# Patient Record
Sex: FEMALE | Race: WHITE | Employment: UNEMPLOYED | URBAN - NONMETROPOLITAN AREA
[De-identification: names, ages, dates, MRNs, and addresses within clinical notes are randomized per-mention and may not be internally consistent; named-entity substitution may affect disease eponyms.]

---

## 2018-08-04 ENCOUNTER — APPOINTMENT (OUTPATIENT)
Dept: GENERAL RADIOLOGY | Age: 38
End: 2018-08-04
Payer: MEDICAID

## 2018-08-04 ENCOUNTER — HOSPITAL ENCOUNTER (EMERGENCY)
Age: 38
Discharge: HOME | End: 2018-08-05
Payer: MEDICAID

## 2018-08-04 VITALS
OXYGEN SATURATION: 97 % | TEMPERATURE: 97.2 F | HEIGHT: 63 IN | DIASTOLIC BLOOD PRESSURE: 87 MMHG | RESPIRATION RATE: 20 BRPM | HEART RATE: 89 BPM | SYSTOLIC BLOOD PRESSURE: 122 MMHG | WEIGHT: 230 LBS | BODY MASS INDEX: 40.75 KG/M2

## 2018-08-04 DIAGNOSIS — S93.402A SPRAIN OF LEFT ANKLE, UNSPECIFIED LIGAMENT, INITIAL ENCOUNTER: Primary | ICD-10-CM

## 2018-08-04 PROCEDURE — 6370000000 HC RX 637 (ALT 250 FOR IP): Performed by: NURSE PRACTITIONER

## 2018-08-04 PROCEDURE — 73610 X-RAY EXAM OF ANKLE: CPT

## 2018-08-04 PROCEDURE — 99283 EMERGENCY DEPT VISIT LOW MDM: CPT

## 2018-08-04 PROCEDURE — 6360000002 HC RX W HCPCS: Performed by: NURSE PRACTITIONER

## 2018-08-04 RX ORDER — SUMATRIPTAN 100 MG/1
100 TABLET, FILM COATED ORAL
COMMUNITY
End: 2018-12-07 | Stop reason: SDUPTHER

## 2018-08-04 RX ORDER — DIPHENOXYLATE HYDROCHLORIDE AND ATROPINE SULFATE 2.5; .025 MG/1; MG/1
1 TABLET ORAL 4 TIMES DAILY PRN
COMMUNITY
End: 2018-10-17 | Stop reason: SDUPTHER

## 2018-08-04 RX ORDER — TIZANIDINE HYDROCHLORIDE 4 MG/1
4 CAPSULE, GELATIN COATED ORAL 3 TIMES DAILY
COMMUNITY
End: 2018-11-15 | Stop reason: SDUPTHER

## 2018-08-04 RX ORDER — OXYCODONE HYDROCHLORIDE AND ACETAMINOPHEN 5; 325 MG/1; MG/1
2 TABLET ORAL ONCE
Status: COMPLETED | OUTPATIENT
Start: 2018-08-04 | End: 2018-08-04

## 2018-08-04 RX ORDER — ARIPIPRAZOLE 15 MG/1
15 TABLET ORAL DAILY
COMMUNITY
End: 2018-12-07 | Stop reason: SDUPTHER

## 2018-08-04 RX ORDER — GABAPENTIN 600 MG/1
600 TABLET ORAL 4 TIMES DAILY
COMMUNITY
End: 2018-08-28 | Stop reason: ALTCHOICE

## 2018-08-04 RX ORDER — NORTRIPTYLINE HYDROCHLORIDE 75 MG/1
75 CAPSULE ORAL NIGHTLY
COMMUNITY
End: 2018-11-27 | Stop reason: SDUPTHER

## 2018-08-04 RX ORDER — ZOLPIDEM TARTRATE 5 MG/1
5 TABLET ORAL NIGHTLY PRN
COMMUNITY
End: 2018-08-28

## 2018-08-04 RX ORDER — IBUPROFEN 800 MG/1
800 TABLET ORAL EVERY 6 HOURS PRN
COMMUNITY
End: 2018-08-28 | Stop reason: SDUPTHER

## 2018-08-04 RX ORDER — ONDANSETRON 4 MG/1
4 TABLET, ORALLY DISINTEGRATING ORAL ONCE
Status: COMPLETED | OUTPATIENT
Start: 2018-08-04 | End: 2018-08-04

## 2018-08-04 RX ADMIN — ONDANSETRON 4 MG: 4 TABLET, ORALLY DISINTEGRATING ORAL at 23:38

## 2018-08-04 RX ADMIN — OXYCODONE HYDROCHLORIDE AND ACETAMINOPHEN 2 TABLET: 5; 325 TABLET ORAL at 23:38

## 2018-08-04 ASSESSMENT — PAIN SCALES - GENERAL
PAINLEVEL_OUTOF10: 8
PAINLEVEL_OUTOF10: 8

## 2018-08-05 PROCEDURE — 99283 EMERGENCY DEPT VISIT LOW MDM: CPT | Performed by: NURSE PRACTITIONER

## 2018-08-05 RX ORDER — NAPROXEN 500 MG/1
500 TABLET ORAL 2 TIMES DAILY
Qty: 20 TABLET | Refills: 0 | Status: SHIPPED | OUTPATIENT
Start: 2018-08-05 | End: 2018-08-28

## 2018-08-05 NOTE — ED PROVIDER NOTES
[id number]  [phone]    As needed, If symptoms worsen      DISCHARGE MEDICATIONS:  Discharge Medication List as of 8/5/2018 12:05 AM      START taking these medications    Details   naproxen (NAPROSYN) 500 MG tablet Take 1 tablet by mouth 2 times daily, Disp-20 tablet, R-0Print             (Please note that portions of this note were completed with a voice recognition program.  Efforts were made to edit the dictations but occasionally words are mis-transcribed.)    SID Gomez APRN  08/05/18 0100

## 2018-08-28 ENCOUNTER — OFFICE VISIT (OUTPATIENT)
Dept: PRIMARY CARE CLINIC | Age: 38
End: 2018-08-28
Payer: MEDICAID

## 2018-08-28 VITALS
HEIGHT: 64 IN | OXYGEN SATURATION: 99 % | SYSTOLIC BLOOD PRESSURE: 122 MMHG | DIASTOLIC BLOOD PRESSURE: 78 MMHG | TEMPERATURE: 97.7 F | WEIGHT: 240.8 LBS | BODY MASS INDEX: 41.11 KG/M2 | HEART RATE: 67 BPM

## 2018-08-28 DIAGNOSIS — G62.9 NEUROPATHY: ICD-10-CM

## 2018-08-28 DIAGNOSIS — F32.A ANXIETY AND DEPRESSION: ICD-10-CM

## 2018-08-28 DIAGNOSIS — K30 FUNCTIONAL DYSPEPSIA: ICD-10-CM

## 2018-08-28 DIAGNOSIS — F51.01 PRIMARY INSOMNIA: ICD-10-CM

## 2018-08-28 DIAGNOSIS — Z13.220 LIPID SCREENING: ICD-10-CM

## 2018-08-28 DIAGNOSIS — F41.9 ANXIETY AND DEPRESSION: ICD-10-CM

## 2018-08-28 DIAGNOSIS — Z13.29 THYROID DISORDER SCREEN: ICD-10-CM

## 2018-08-28 DIAGNOSIS — K58.0 IRRITABLE BOWEL SYNDROME WITH DIARRHEA: ICD-10-CM

## 2018-08-28 DIAGNOSIS — Z79.899 DRUG THERAPY: ICD-10-CM

## 2018-08-28 DIAGNOSIS — Z76.89 ENCOUNTER TO ESTABLISH CARE: Primary | ICD-10-CM

## 2018-08-28 DIAGNOSIS — Z13.1 DIABETES MELLITUS SCREENING: ICD-10-CM

## 2018-08-28 DIAGNOSIS — G43.709 CHRONIC MIGRAINE WITHOUT AURA WITHOUT STATUS MIGRAINOSUS, NOT INTRACTABLE: ICD-10-CM

## 2018-08-28 DIAGNOSIS — M51.36 DDD (DEGENERATIVE DISC DISEASE), LUMBAR: ICD-10-CM

## 2018-08-28 PROBLEM — I10 BENIGN ESSENTIAL HTN: Status: ACTIVE | Noted: 2018-08-28

## 2018-08-28 LAB
AMPHETAMINE SCREEN, URINE: ABNORMAL
BARBITURATE SCREEN, URINE: ABNORMAL
BENZODIAZEPINE SCREEN, URINE: ABNORMAL
BUPRENORPHINE URINE: ABNORMAL
COCAINE METABOLITE SCREEN URINE: ABNORMAL
GABAPENTIN SCREEN, URINE: ABNORMAL
METHADONE SCREEN, URINE: ABNORMAL
METHAMPHETAMINE, URINE: ABNORMAL
OPIATE SCREEN URINE: ABNORMAL
OXYCODONE SCREEN URINE: ABNORMAL
PHENCYCLIDINE SCREEN URINE: ABNORMAL
PROPOXYPHENE SCREEN, URINE: ABNORMAL
THC SCREEN, URINE: ABNORMAL
TRICYCLIC ANTIDEPRESSANTS, UR: ABNORMAL

## 2018-08-28 PROCEDURE — 80305 DRUG TEST PRSMV DIR OPT OBS: CPT | Performed by: NURSE PRACTITIONER

## 2018-08-28 PROCEDURE — 99204 OFFICE O/P NEW MOD 45 MIN: CPT | Performed by: NURSE PRACTITIONER

## 2018-08-28 RX ORDER — ZOLPIDEM TARTRATE 5 MG/1
5 TABLET ORAL NIGHTLY PRN
Qty: 30 TABLET | Refills: 0 | Status: CANCELLED | OUTPATIENT
Start: 2018-08-28 | End: 2018-09-27

## 2018-08-28 RX ORDER — HYDROXYZINE PAMOATE 25 MG/1
25 CAPSULE ORAL 3 TIMES DAILY PRN
Qty: 60 CAPSULE | Refills: 0 | Status: SHIPPED | OUTPATIENT
Start: 2018-08-28 | End: 2018-09-27

## 2018-08-28 RX ORDER — ALBUTEROL SULFATE 90 UG/1
2 AEROSOL, METERED RESPIRATORY (INHALATION) EVERY 6 HOURS PRN
COMMUNITY
End: 2018-10-26 | Stop reason: SDUPTHER

## 2018-08-28 RX ORDER — IBUPROFEN 800 MG/1
800 TABLET ORAL EVERY 6 HOURS PRN
Qty: 60 TABLET | Refills: 1 | Status: SHIPPED | OUTPATIENT
Start: 2018-08-28 | End: 2018-10-04 | Stop reason: SDUPTHER

## 2018-08-28 RX ORDER — DULOXETIN HYDROCHLORIDE 20 MG/1
20 CAPSULE, DELAYED RELEASE ORAL DAILY
Qty: 30 CAPSULE | Refills: 3 | Status: SHIPPED | OUTPATIENT
Start: 2018-08-28 | End: 2018-09-28 | Stop reason: SDUPTHER

## 2018-08-28 RX ORDER — GABAPENTIN 600 MG/1
600 TABLET ORAL 4 TIMES DAILY
Qty: 120 TABLET | Refills: 0 | Status: CANCELLED | OUTPATIENT
Start: 2018-08-28 | End: 2018-09-27

## 2018-08-28 ASSESSMENT — PATIENT HEALTH QUESTIONNAIRE - PHQ9
SUM OF ALL RESPONSES TO PHQ QUESTIONS 1-9: 1
SUM OF ALL RESPONSES TO PHQ QUESTIONS 1-9: 1
SUM OF ALL RESPONSES TO PHQ9 QUESTIONS 1 & 2: 1
1. LITTLE INTEREST OR PLEASURE IN DOING THINGS: 0
2. FEELING DOWN, DEPRESSED OR HOPELESS: 1

## 2018-08-28 ASSESSMENT — ENCOUNTER SYMPTOMS
RESPIRATORY NEGATIVE: 1
BACK PAIN: 1
EYES NEGATIVE: 1
GASTROINTESTINAL NEGATIVE: 1

## 2018-08-28 NOTE — PROGRESS NOTES
[facility]  [address]  [address] [facility] [address]  Dept: [phone]  Dept Fax: [phone]  Loc: [phone]    Danis Raymond is a 43 y.o. female who presents today for her medical conditions/complaints as noted below. Danis Raymond is c/o of Establish Care (needing medication refilled) and Anxiety (having some issues)      Chief Complaint   Patient presents with    Establish Care     needing medication refilled    Anxiety     having some issues       HPI:     HPI  Patient is here to establish care. Patient has a known medical history including asthma, degenerative disc disease and lumbar region, migraines, and neuropathy. She is needing refills on multiple medications including ibuprofen 800, Ambien, verapamil, and Neurontin. She is wanting to discuss changing from Neurontin to another medication. She reports taking for 2-3 years in [place] without much relief. Last dose was 600 mg QID. She has not tried Cymbalta or Lyrica for the discomfort. Patient reports smoking marijuana while in [place], but it was legal there.       Past Medical History:   Diagnosis Date    Asthma     Degenerative disc disease, lumbar     Migraines     Neuropathy         Past Surgical History:   Procedure Laterality Date     SECTION      ELBOW SURGERY      right    RECTAL SURGERY      x2       Social History   Substance Use Topics    Smoking status: Former Smoker     Packs/day: 1.00     Years: 10.00     Quit date: 2017    Smokeless tobacco: Never Used    Alcohol use No        Current Outpatient Prescriptions   Medication Sig Dispense Refill    albuterol sulfate HFA (VENTOLIN HFA) 108 (90 Base) MCG/ACT inhaler Inhale 2 puffs into the lungs every 6 hours as needed for Wheezing      verapamil (CALAN SR) 180 MG extended release tablet Take 1 tablet by mouth nightly 30 tablet 1    ibuprofen (ADVIL;MOTRIN) 800 MG tablet Take 1 tablet by mouth every 6 found for this visit on 08/28/18. Plan:     UDS was positive for THC, but patient last smoked while in [place] where it was legal.  I will repeat UDS in 1 month if [facility] plan to send in Ambien as discussed. I am having patient repeat labs in 3 months and will monitor lipid panel since it was elevated at last check in [place]. I am starting Cymbalta to help with Neuropathy and anxiety. Return in about 1 month (around 9/28/2018) for Medication Follow Up.     Orders Placed This Encounter   Procedures    CBC     Standing Status:   Future     Standing Expiration Date:   8/28/2019    Comprehensive Metabolic Panel     Standing Status:   Future     Standing Expiration Date:   8/28/2019    TSH without Reflex     Standing Status:   Future     Standing Expiration Date:   8/29/2019    Lipid Panel     Standing Status:   Future     Standing Expiration Date:   8/28/2019     Order Specific Question:   Is Patient Fasting?/# of Hours     Answer:   8 hours    Hemoglobin A1C     Standing Status:   Future     Standing Expiration Date:   8/28/2019   [id number], APRN     Referral Priority:   Routine     Referral Type:   Eval and Treat     Referral Reason:   Specialty Services Required     Referred to Provider:   SID Romero - CNP     Requested Specialty:   Nurse Practitioner     Number of Visits Requested:   1    POCT Rapid Drug Screen       Orders Placed This Encounter   Medications    verapamil (CALAN SR) 180 MG extended release tablet     Sig: Take 1 tablet by mouth nightly     Dispense:  30 tablet     Refill:  1    ibuprofen (ADVIL;MOTRIN) 800 MG tablet     Sig: Take 1 tablet by mouth every 6 hours as needed for Pain     Dispense:  60 tablet     Refill:  1    DULoxetine (CYMBALTA) 20 MG extended release capsule     Sig: Take 1 capsule by mouth daily     Dispense:  30 capsule     Refill:  3    hydrOXYzine (VISTARIL) 25 MG capsule     Sig: Take 1 capsule by

## 2018-08-29 ENCOUNTER — HOSPITAL ENCOUNTER (OUTPATIENT)
Dept: PAIN MANAGEMENT | Age: 38
Discharge: HOME | End: 2018-08-29
Payer: MEDICAID

## 2018-08-29 ENCOUNTER — HOSPITAL ENCOUNTER (OUTPATIENT)
Dept: GENERAL RADIOLOGY | Age: 38
Discharge: HOME | End: 2018-08-29
Payer: MEDICAID

## 2018-08-29 VITALS
RESPIRATION RATE: 18 BRPM | HEART RATE: 104 BPM | HEIGHT: 64 IN | BODY MASS INDEX: 40.97 KG/M2 | WEIGHT: 240 LBS | TEMPERATURE: 97.5 F | OXYGEN SATURATION: 94 %

## 2018-08-29 DIAGNOSIS — M54.5 CHRONIC MIDLINE LOW BACK PAIN, WITH SCIATICA PRESENCE UNSPECIFIED: ICD-10-CM

## 2018-08-29 DIAGNOSIS — M79.18 MYOFACIAL MUSCLE PAIN: ICD-10-CM

## 2018-08-29 DIAGNOSIS — M53.3 SACROILIAC JOINT DYSFUNCTION OF BOTH SIDES: ICD-10-CM

## 2018-08-29 DIAGNOSIS — M53.3 SACROILIAC JOINT DYSFUNCTION OF BOTH SIDES: Primary | ICD-10-CM

## 2018-08-29 DIAGNOSIS — G89.29 CHRONIC MIDLINE LOW BACK PAIN, WITH SCIATICA PRESENCE UNSPECIFIED: ICD-10-CM

## 2018-08-29 DIAGNOSIS — M51.36 DDD (DEGENERATIVE DISC DISEASE), LUMBAR: ICD-10-CM

## 2018-08-29 PROBLEM — M54.50 CHRONIC MIDLINE LOW BACK PAIN: Status: ACTIVE | Noted: 2018-08-29

## 2018-08-29 PROCEDURE — 99214 OFFICE O/P EST MOD 30 MIN: CPT | Performed by: NURSE PRACTITIONER

## 2018-08-29 PROCEDURE — 73521 X-RAY EXAM HIPS BI 2 VIEWS: CPT

## 2018-08-29 PROCEDURE — 99205 OFFICE O/P NEW HI 60 MIN: CPT

## 2018-08-29 ASSESSMENT — ENCOUNTER SYMPTOMS
SHORTNESS OF BREATH: 0
WHEEZING: 0
BLURRED VISION: 0
SORE THROAT: 0
CONSTIPATION: 0
BACK PAIN: 1

## 2018-08-29 ASSESSMENT — PAIN DESCRIPTION - PROGRESSION: CLINICAL_PROGRESSION: NOT CHANGED

## 2018-08-29 ASSESSMENT — PAIN DESCRIPTION - FREQUENCY: FREQUENCY: CONTINUOUS

## 2018-08-29 ASSESSMENT — PAIN DESCRIPTION - ONSET: ONSET: ON-GOING

## 2018-08-29 ASSESSMENT — PAIN SCALES - GENERAL: PAINLEVEL_OUTOF10: 8

## 2018-08-29 ASSESSMENT — PAIN DESCRIPTION - LOCATION: LOCATION: BACK;LEG

## 2018-08-29 ASSESSMENT — PAIN DESCRIPTION - DIRECTION: RADIATING_TOWARDS: RADIATES DOWN LEFT LEG

## 2018-08-29 ASSESSMENT — PAIN DESCRIPTION - PAIN TYPE: TYPE: CHRONIC PAIN

## 2018-08-29 ASSESSMENT — ACTIVITIES OF DAILY LIVING (ADL): EFFECT OF PAIN ON DAILY ACTIVITIES: LIMITS ACTIVITIES

## 2018-08-29 ASSESSMENT — PAIN DESCRIPTION - ORIENTATION: ORIENTATION: LOWER

## 2018-08-29 NOTE — PROGRESS NOTES
[facility]    History and Physical    Patient Name: Celia Major    MR #: [id number]    Account #: [de-identified]    : 1980    Age: 43 y.o. Sex: female    Date: 2018    PCP: SID Martinez    Referring Provider:    Chief Complaint:   Chief Complaint   Patient presents with    Lower Back Pain     radiates down left leg    Back Pain     mid back       History of Present Illness: The patient is a 43 y.o. female who presented to the office on referral with primary complaints of chronic lower back pain with lateral left leg pain down to foot. No loss of bowel or bladder. No back surgeries noted. Pt reports pain started in [place] while working at [employer]. Pt moved to [address] [facility] and was seeing pain management in [place] [address] Pain management. Pt reports opiates and injections ( apparently LESI x 3 with no success). Patient was signed for pain managementin [place]. Patient reports recently \"established primary care and did have THC in urine but she that is legal in [place]\" as she states. Explained to her that opioid medications may not be use going forward. We will focus on non-opioid interventions. Back Pain   This is a chronic problem. The current episode started more than 1 year ago. The problem occurs daily. The problem is unchanged. The pain is present in the lumbar spine and sacro-iliac. The quality of the pain is described as aching and shooting. The pain radiates to the left knee, left thigh and left foot. The pain is at a severity of 4/10. The pain is mild. The pain is the same all the time. The symptoms are aggravated by standing, twisting and bending. Stiffness is present all day. Associated symptoms include leg pain ( lateral leg pain down to foot). Pertinent negatives include no chest pain, dysuria or fever. She has tried ice, heat, analgesics, bed rest, NSAIDs and muscle relaxant for the symptoms.  The treatment  tiZANidine (ZANAFLEX) 4 MG capsule Take 4 mg by mouth 3 times daily      diphenoxylate-atropine (LOMOTIL) 2.5-0.025 MG per tablet Take 1 tablet by mouth 4 times daily as needed for Diarrhea. Pankaj Bolk SUMAtriptan (IMITREX) 100 MG tablet Take 100 mg by mouth once as needed for Migraine       No current facility-administered medications for this encounter. Allergies  Pcn [penicillins] and Sulfa antibiotics     Current Medications  Current Outpatient Prescriptions   Medication Sig Dispense Refill    albuterol sulfate HFA (VENTOLIN HFA) 108 (90 Base) MCG/ACT inhaler Inhale 2 puffs into the lungs every 6 hours as needed for Wheezing      verapamil (CALAN SR) 180 MG extended release tablet Take 1 tablet by mouth nightly 30 tablet 1    ibuprofen (ADVIL;MOTRIN) 800 MG tablet Take 1 tablet by mouth every 6 hours as needed for Pain 60 tablet 1    DULoxetine (CYMBALTA) 20 MG extended release capsule Take 1 capsule by mouth daily 30 capsule 3    hydrOXYzine (VISTARIL) 25 MG capsule Take 1 capsule by mouth 3 times daily as needed for Anxiety 60 capsule 0    ARIPiprazole (ABILIFY) 15 MG tablet Take 15 mg by mouth daily      nortriptyline (PAMELOR) 75 MG capsule Take 75 mg by mouth nightly      tiZANidine (ZANAFLEX) 4 MG capsule Take 4 mg by mouth 3 times daily      diphenoxylate-atropine (LOMOTIL) 2.5-0.025 MG per tablet Take 1 tablet by mouth 4 times daily as needed for Diarrhea. Pankaj Bolk SUMAtriptan (IMITREX) 100 MG tablet Take 100 mg by mouth once as needed for Migraine       No current facility-administered medications for this encounter.         Social History    Social History     Social History    Marital status:      Spouse name: N/A    Number of children: N/A    Years of education: N/A     Social History Main Topics    Smoking status: Former Smoker     Packs/day: 1.00     Years: 10.00     Types: Cigarettes     Quit date: 12/2017    Smokeless tobacco: Never Used    Alcohol use No    Drug use:

## 2018-08-30 DIAGNOSIS — M89.8X8 ILIAC BONE PAIN: Primary | ICD-10-CM

## 2018-09-12 ENCOUNTER — HOSPITAL ENCOUNTER (OUTPATIENT)
Dept: MRI IMAGING | Age: 38
Discharge: HOME | End: 2018-09-12
Payer: MEDICAID

## 2018-09-12 DIAGNOSIS — G89.29 CHRONIC MIDLINE LOW BACK PAIN, WITH SCIATICA PRESENCE UNSPECIFIED: ICD-10-CM

## 2018-09-12 DIAGNOSIS — M54.5 CHRONIC MIDLINE LOW BACK PAIN, WITH SCIATICA PRESENCE UNSPECIFIED: ICD-10-CM

## 2018-09-12 PROCEDURE — 72148 MRI LUMBAR SPINE W/O DYE: CPT

## 2018-09-28 ENCOUNTER — OFFICE VISIT (OUTPATIENT)
Dept: PRIMARY CARE CLINIC | Age: 38
End: 2018-09-28
Payer: MEDICAID

## 2018-09-28 VITALS
DIASTOLIC BLOOD PRESSURE: 86 MMHG | OXYGEN SATURATION: 96 % | SYSTOLIC BLOOD PRESSURE: 124 MMHG | WEIGHT: 247 LBS | HEART RATE: 92 BPM | TEMPERATURE: 97.3 F | BODY MASS INDEX: 42.17 KG/M2 | HEIGHT: 64 IN

## 2018-09-28 DIAGNOSIS — F41.9 ANXIETY AND DEPRESSION: Primary | ICD-10-CM

## 2018-09-28 DIAGNOSIS — F32.A ANXIETY AND DEPRESSION: Primary | ICD-10-CM

## 2018-09-28 DIAGNOSIS — F51.01 PRIMARY INSOMNIA: ICD-10-CM

## 2018-09-28 DIAGNOSIS — G62.9 NEUROPATHY: ICD-10-CM

## 2018-09-28 DIAGNOSIS — Z79.899 DRUG THERAPY: ICD-10-CM

## 2018-09-28 LAB

## 2018-09-28 PROCEDURE — 80305 DRUG TEST PRSMV DIR OPT OBS: CPT | Performed by: NURSE PRACTITIONER

## 2018-09-28 PROCEDURE — 99214 OFFICE O/P EST MOD 30 MIN: CPT | Performed by: NURSE PRACTITIONER

## 2018-09-28 RX ORDER — DULOXETIN HYDROCHLORIDE 30 MG/1
30 CAPSULE, DELAYED RELEASE ORAL DAILY
Qty: 30 CAPSULE | Refills: 1 | Status: SHIPPED | OUTPATIENT
Start: 2018-09-28 | End: 2018-12-07 | Stop reason: SDUPTHER

## 2018-09-28 RX ORDER — ZOLPIDEM TARTRATE 5 MG/1
5 TABLET ORAL NIGHTLY PRN
Qty: 30 TABLET | Refills: 1 | Status: SHIPPED | OUTPATIENT
Start: 2018-09-28 | End: 2018-10-28

## 2018-09-28 ASSESSMENT — ENCOUNTER SYMPTOMS
RESPIRATORY NEGATIVE: 1
EYES NEGATIVE: 1
GASTROINTESTINAL NEGATIVE: 1

## 2018-09-28 NOTE — PROGRESS NOTES
ibuprofen (ADVIL;MOTRIN) 800 MG tablet Take 1 tablet by mouth every 6 hours as needed for Pain 60 tablet 1    ARIPiprazole (ABILIFY) 15 MG tablet Take 15 mg by mouth daily      nortriptyline (PAMELOR) 75 MG capsule Take 75 mg by mouth nightly      tiZANidine (ZANAFLEX) 4 MG capsule Take 4 mg by mouth 3 times daily      diphenoxylate-atropine (LOMOTIL) 2.5-0.025 MG per tablet Take 1 tablet by mouth 4 times daily as needed for Diarrhea. Suleman Devoid SUMAtriptan (IMITREX) 100 MG tablet Take 100 mg by mouth once as needed for Migraine       No current facility-administered medications for this visit. Allergies   Allergen Reactions    Pcn [Penicillins]     Sulfa Antibiotics        History reviewed. No pertinent family history. Subjective:      Review of Systems   Constitutional: Negative. HENT: Negative. Eyes: Negative. Respiratory: Negative. Cardiovascular: Negative. Gastrointestinal: Negative. Endocrine: Negative. Genitourinary: Negative. Musculoskeletal: Negative. Skin: Negative. Neurological: Negative. Hematological: Negative. Psychiatric/Behavioral: Positive for sleep disturbance. The patient is nervous/anxious. Objective:     Physical Exam   Constitutional: She is oriented to person, place, and time. Vital signs are normal. She appears well-developed and well-nourished. HENT:   Head: Normocephalic and atraumatic. Right Ear: Hearing, tympanic membrane, external ear and ear canal normal.   Left Ear: Hearing, tympanic membrane, external ear and ear canal normal.   Nose: Nose normal.   Mouth/Throat: Uvula is midline, oropharynx is clear and moist and mucous membranes are normal.   Eyes: Pupils are equal, round, and reactive to light. Conjunctivae, EOM and lids are normal.   Neck: Trachea normal and normal range of motion. Neck supple. No thyroid mass and no thyromegaly present.    Cardiovascular: Normal rate, regular rhythm, normal heart sounds and normal pulses. Pulmonary/Chest: Effort normal and breath sounds normal.   Abdominal: Soft. Normal appearance and bowel sounds are normal.   Musculoskeletal: Normal range of motion. Cervical back: Normal. She exhibits normal range of motion and no tenderness. Thoracic back: Normal. She exhibits normal range of motion and no tenderness. Lumbar back: Normal. She exhibits normal range of motion and no tenderness. Neurological: She is alert and oriented to person, place, and time. She has normal strength. Skin: Skin is warm, dry and intact. Psychiatric: She has a normal mood and affect. Her speech is normal and behavior is normal. Judgment and thought content normal. Cognition and memory are normal.   Nursing note and vitals reviewed. /86   Pulse 92   Temp 97.3 °F (36.3 °C)   Ht 5' 4\" (1.626 m)   Wt 247 lb (112 kg)   SpO2 96%   BMI 42.40 kg/m²     Assessment:      Diagnosis Orders   1. Anxiety and depression  DULoxetine (CYMBALTA) 30 MG extended release capsule   2. Drug therapy  POCT Rapid Drug Screen   3. Neuropathy     4. Primary insomnia  zolpidem (AMBIEN) 5 MG tablet       Results for orders placed or performed in visit on 09/28/18   POCT Rapid Drug Screen   Result Value Ref Range    Amphetamine Screen, Urine N     Barbiturate Screen, Urine N     Benzodiazepine Screen, Urine N     Buprenorphine Urine N     Cocaine Metabolite Screen, Urine N     Gabapentin Screen, Urine N     Methamphetamine, Urine N     Methadone Screen, Urine N     Opiate Scrn, Ur N     Oxycodone Screen, Ur N     PCP Screen, Urine N     Propoxyphene Screen, Urine N     THC Screen, Urine N     Tricyclic Antidepressants, Urine Pos        Plan:     I am sending in Ambien to help with insomnia. I'm also increasing Cymbalta to 30 mg daily to see if this will help with overall anxiety neuropathy feelings. She is to continue current regimen on all other medications.     Return in about 4 weeks (around 10/26/2018) for insomnia and medication start. Orders Placed This Encounter   Procedures    POCT Rapid Drug Screen       Orders Placed This Encounter   Medications    DULoxetine (CYMBALTA) 30 MG extended release capsule     Sig: Take 1 capsule by mouth daily     Dispense:  30 capsule     Refill:  1    zolpidem (AMBIEN) 5 MG tablet     Sig: Take 1 tablet by mouth nightly as needed for Sleep for up to 30 days. .     Dispense:  30 tablet     Refill:  1        Patient given educational materials - see patient instructions. Discussed use, benefit, and side effects of prescribed medications. All patient questions answered. Pt voiced understanding. Reviewed health maintenance. Instructed to continue current medications, diet and exercise. Patient agreed with treatment plan. Follow up as directed.            Electronically signed by SID Barnhart on 9/28/2018 at 2:21 PM

## 2018-10-04 DIAGNOSIS — G62.9 NEUROPATHY: ICD-10-CM

## 2018-10-04 RX ORDER — IBUPROFEN 800 MG/1
800 TABLET ORAL EVERY 6 HOURS PRN
Qty: 60 TABLET | Refills: 1 | Status: SHIPPED | OUTPATIENT
Start: 2018-10-04 | End: 2018-12-07 | Stop reason: SDUPTHER

## 2018-10-17 DIAGNOSIS — R19.7 DIARRHEA, UNSPECIFIED TYPE: Primary | ICD-10-CM

## 2018-10-18 RX ORDER — DIPHENOXYLATE HYDROCHLORIDE AND ATROPINE SULFATE 2.5; .025 MG/1; MG/1
1 TABLET ORAL 4 TIMES DAILY PRN
Qty: 30 TABLET | Refills: 1 | Status: SHIPPED | OUTPATIENT
Start: 2018-10-18 | End: 2018-10-26 | Stop reason: SDUPTHER

## 2018-10-26 ENCOUNTER — OFFICE VISIT (OUTPATIENT)
Dept: PRIMARY CARE CLINIC | Age: 38
End: 2018-10-26
Payer: MEDICAID

## 2018-10-26 VITALS
HEIGHT: 64 IN | WEIGHT: 250 LBS | OXYGEN SATURATION: 98 % | DIASTOLIC BLOOD PRESSURE: 80 MMHG | HEART RATE: 99 BPM | BODY MASS INDEX: 42.68 KG/M2 | TEMPERATURE: 98 F | SYSTOLIC BLOOD PRESSURE: 120 MMHG

## 2018-10-26 DIAGNOSIS — R19.7 DIARRHEA, UNSPECIFIED TYPE: ICD-10-CM

## 2018-10-26 DIAGNOSIS — F51.5 NIGHTMARES: ICD-10-CM

## 2018-10-26 DIAGNOSIS — K30 FUNCTIONAL DYSPEPSIA: ICD-10-CM

## 2018-10-26 DIAGNOSIS — F32.A ANXIETY AND DEPRESSION: ICD-10-CM

## 2018-10-26 DIAGNOSIS — F41.9 ANXIETY AND DEPRESSION: ICD-10-CM

## 2018-10-26 DIAGNOSIS — F51.01 PRIMARY INSOMNIA: Primary | ICD-10-CM

## 2018-10-26 PROCEDURE — 99214 OFFICE O/P EST MOD 30 MIN: CPT | Performed by: NURSE PRACTITIONER

## 2018-10-26 NOTE — PROGRESS NOTES
30 days. . 120 tablet 1    ibuprofen (ADVIL;MOTRIN) 800 MG tablet Take 1 tablet by mouth every 6 hours as needed for Pain 60 tablet 1    DULoxetine (CYMBALTA) 30 MG extended release capsule Take 1 capsule by mouth daily 30 capsule 1    ARIPiprazole (ABILIFY) 15 MG tablet Take 15 mg by mouth daily      nortriptyline (PAMELOR) 75 MG capsule Take 75 mg by mouth nightly      tiZANidine (ZANAFLEX) 4 MG capsule Take 4 mg by mouth 3 times daily      SUMAtriptan (IMITREX) 100 MG tablet Take 100 mg by mouth once as needed for Migraine      verapamil (CALAN SR) 180 MG extended release tablet Take 1 tablet by mouth nightly 30 tablet 1     No current facility-administered medications for this visit. Allergies   Allergen Reactions    Pcn [Penicillins]     Sulfa Antibiotics        History reviewed. No pertinent family history. Subjective:      Review of Systems   Constitutional: Negative. HENT: Negative. Eyes: Negative. Respiratory: Negative. Cardiovascular: Negative. Gastrointestinal: Positive for abdominal pain (chronic). Endocrine: Negative. Genitourinary: Negative. Musculoskeletal: Negative. Skin: Negative. Neurological: Negative. Hematological: Negative. Psychiatric/Behavioral: Positive for sleep disturbance. Negative for decreased concentration and suicidal ideas. The patient is nervous/anxious. Objective:     Physical Exam   Constitutional: She is oriented to person, place, and time. Vital signs are normal. She appears well-developed and well-nourished. HENT:   Head: Normocephalic and atraumatic. Right Ear: Hearing, tympanic membrane, external ear and ear canal normal.   Left Ear: Hearing, tympanic membrane, external ear and ear canal normal.   Nose: Nose normal.   Mouth/Throat: Uvula is midline, oropharynx is clear and moist and mucous membranes are normal.   Eyes: Pupils are equal, round, and reactive to light.  Conjunctivae, EOM and lids are normal.

## 2018-10-29 DIAGNOSIS — G43.709 CHRONIC MIGRAINE WITHOUT AURA WITHOUT STATUS MIGRAINOSUS, NOT INTRACTABLE: ICD-10-CM

## 2018-10-29 RX ORDER — PRAZOSIN HYDROCHLORIDE 1 MG/1
1 CAPSULE ORAL NIGHTLY
Qty: 30 CAPSULE | Refills: 3 | Status: SHIPPED | OUTPATIENT
Start: 2018-10-29 | End: 2019-02-12 | Stop reason: ALTCHOICE

## 2018-10-29 RX ORDER — DIPHENOXYLATE HYDROCHLORIDE AND ATROPINE SULFATE 2.5; .025 MG/1; MG/1
1 TABLET ORAL 4 TIMES DAILY PRN
Qty: 120 TABLET | Refills: 1 | Status: SHIPPED | OUTPATIENT
Start: 2018-10-29 | End: 2019-01-02 | Stop reason: SDUPTHER

## 2018-10-29 RX ORDER — ALBUTEROL SULFATE 90 UG/1
2 AEROSOL, METERED RESPIRATORY (INHALATION) EVERY 6 HOURS PRN
Qty: 1 INHALER | Refills: 2 | Status: SHIPPED | OUTPATIENT
Start: 2018-10-29

## 2018-10-29 ASSESSMENT — ENCOUNTER SYMPTOMS
EYES NEGATIVE: 1
ABDOMINAL PAIN: 1
RESPIRATORY NEGATIVE: 1

## 2018-10-30 ENCOUNTER — HOSPITAL ENCOUNTER (OUTPATIENT)
Dept: PAIN MANAGEMENT | Age: 38
Discharge: HOME | End: 2018-10-30
Payer: MEDICAID

## 2018-10-30 VITALS
WEIGHT: 250 LBS | TEMPERATURE: 98 F | BODY MASS INDEX: 42.68 KG/M2 | HEART RATE: 99 BPM | HEIGHT: 64 IN | DIASTOLIC BLOOD PRESSURE: 90 MMHG | OXYGEN SATURATION: 98 % | RESPIRATION RATE: 18 BRPM | SYSTOLIC BLOOD PRESSURE: 137 MMHG

## 2018-10-30 DIAGNOSIS — M47.817 LUMBOSACRAL SPONDYLOSIS WITHOUT MYELOPATHY: ICD-10-CM

## 2018-10-30 DIAGNOSIS — M79.18 MYOFASCIAL PAIN: ICD-10-CM

## 2018-10-30 DIAGNOSIS — M46.1 BILATERAL SACROILIITIS (HCC): ICD-10-CM

## 2018-10-30 PROCEDURE — 99203 OFFICE O/P NEW LOW 30 MIN: CPT

## 2018-10-30 PROCEDURE — 99214 OFFICE O/P EST MOD 30 MIN: CPT | Performed by: NURSE PRACTITIONER

## 2018-10-30 ASSESSMENT — PAIN DESCRIPTION - DIRECTION: RADIATING_TOWARDS: INTO LEFT LOWER EXTREMITY

## 2018-10-30 ASSESSMENT — PAIN DESCRIPTION - PROGRESSION: CLINICAL_PROGRESSION: NOT CHANGED

## 2018-10-30 ASSESSMENT — PAIN DESCRIPTION - DESCRIPTORS: DESCRIPTORS: CONSTANT;RADIATING;THROBBING

## 2018-10-30 ASSESSMENT — ENCOUNTER SYMPTOMS
SHORTNESS OF BREATH: 0
BACK PAIN: 1
SORE THROAT: 0
CONSTIPATION: 0
WHEEZING: 0

## 2018-10-30 ASSESSMENT — ACTIVITIES OF DAILY LIVING (ADL): EFFECT OF PAIN ON DAILY ACTIVITIES: LIMITS ACTIVITY

## 2018-10-30 ASSESSMENT — PAIN DESCRIPTION - LOCATION: LOCATION: BACK

## 2018-10-30 ASSESSMENT — PAIN DESCRIPTION - ONSET: ONSET: ON-GOING

## 2018-10-30 ASSESSMENT — PAIN DESCRIPTION - ORIENTATION: ORIENTATION: LOWER;MID

## 2018-10-30 ASSESSMENT — PAIN DESCRIPTION - PAIN TYPE: TYPE: CHRONIC PAIN

## 2018-10-30 ASSESSMENT — PAIN SCALES - GENERAL: PAINLEVEL_OUTOF10: 8

## 2018-10-30 ASSESSMENT — PAIN DESCRIPTION - FREQUENCY: FREQUENCY: CONTINUOUS

## 2018-10-30 NOTE — PROGRESS NOTES
[facility] - Dr. Marsh - see pts mychart - has multiple things going on - advised this should be addressed at an ov, but wanted you to be aware of the issues.     left iliac bone  adjacent and lateral to the sacroiliac joint. This may be due to  previous surgery? . In the absence of a history of a previous surgery,  this data be further evaluated. Signed by Dr Christine Willard on 8/29/2018 4:34 PM  ----PCP was made aware of findings    _____________________________________________  MRI LUMBAR SPINE WO CONTRAST   9/12/2018 2:02 PM  History: Chronic low back pain. Noncontrast MR imaging of the lumbar spine. Normal curvature and alignment. Normal conus medullaris. No aortic aneurysm. L1-2: Mild facet hypertrophy. No disc abnormality or stenosis. L2-3: Mild facet hypertrophy. Minimal disc protrusion with no  significant stenosis. L3-4: Mild facet and ligamentous hypertrophy. Minimal disc protrusion. No significant stenosis. L4-5: Mild to moderate facet and ligamentous hypertrophy with mild  disc protrusion. No significant stenosis. L5-S1: Disc space narrowing with endplate spurring and mild disc  protrusion. Mild inferior foraminal narrowing on the left. Mild to  moderate facet hypertrophy.     Impression  1. Degenerative disc and facet changes greatest at the lower 2 levels. Minimal disc protrusions with mild inferior foraminal narrowing on the  left at L5-S1.  2. No focal dominant disc herniation or high-grade stenosis. Signed by Dr Rabia Nicolas on 9/12/2018 2:04 PM          Assessment:  Active Problems:    Chronic midline low back pain    DDD (degenerative disc disease), lumbar    Lumbosacral spondylosis without myelopathy    Myofascial pain    Bilateral sacroiliitis (HCC)  Resolved Problems:    * No resolved hospital problems. *      Plan:  1. Continue cymbalta, xanaflex, and motrin. 2. Finish PT as directed- pt reports \"helps to a point\"  3. Discussed and ordered LESI L5-s1 without sedation Dr. Granados  4. May consider Left SI joint injection- Pt needs to follow-up with PCP, may need ortho referral for \"incidental findings on left hip xray\".    5. Weight

## 2018-11-06 DIAGNOSIS — F32.A ANXIETY AND DEPRESSION: ICD-10-CM

## 2018-11-06 DIAGNOSIS — Z13.220 LIPID SCREENING: ICD-10-CM

## 2018-11-06 DIAGNOSIS — F41.9 ANXIETY AND DEPRESSION: ICD-10-CM

## 2018-11-06 DIAGNOSIS — Z13.29 THYROID DISORDER SCREEN: ICD-10-CM

## 2018-11-06 DIAGNOSIS — Z13.1 DIABETES MELLITUS SCREENING: ICD-10-CM

## 2018-11-06 LAB
ALBUMIN SERPL-MCNC: 3.7 G/DL (ref 3.5–5.2)
ALP BLD-CCNC: 71 U/L (ref 35–104)
ALT SERPL-CCNC: 21 U/L (ref 5–33)
ANION GAP SERPL CALCULATED.3IONS-SCNC: 14 MMOL/L (ref 7–19)
AST SERPL-CCNC: 17 U/L (ref 5–32)
BILIRUB SERPL-MCNC: 0.3 MG/DL (ref 0.2–1.2)
BUN BLDV-MCNC: 14 MG/DL (ref 6–20)
CALCIUM SERPL-MCNC: 9.5 MG/DL (ref 8.6–10)
CHLORIDE BLD-SCNC: 100 MMOL/L (ref 98–111)
CHOLESTEROL, TOTAL: 222 MG/DL (ref 160–199)
CO2: 24 MMOL/L (ref 22–29)
CREAT SERPL-MCNC: 0.9 MG/DL (ref 0.5–0.9)
GFR NON-AFRICAN AMERICAN: >60
GLUCOSE BLD-MCNC: 86 MG/DL (ref 74–109)
HBA1C MFR BLD: 5.8 % (ref 4–6)
HCT VFR BLD CALC: 40.6 % (ref 37–47)
HDLC SERPL-MCNC: 40 MG/DL (ref 65–121)
HEMOGLOBIN: 12.9 G/DL (ref 12–16)
LDL CHOLESTEROL CALCULATED: ABNORMAL MG/DL
LDL CHOLESTEROL DIRECT: 109 MG/DL
MCH RBC QN AUTO: 29.6 PG (ref 27–31)
MCHC RBC AUTO-ENTMCNC: 31.8 G/DL (ref 33–37)
MCV RBC AUTO: 93.1 FL (ref 81–99)
PDW BLD-RTO: 12.1 % (ref 11.5–14.5)
PLATELET # BLD: 364 K/UL (ref 130–400)
PMV BLD AUTO: 10 FL (ref 9.4–12.3)
POTASSIUM SERPL-SCNC: 4.5 MMOL/L (ref 3.5–5)
RBC # BLD: 4.36 M/UL (ref 4.2–5.4)
SODIUM BLD-SCNC: 138 MMOL/L (ref 136–145)
TOTAL PROTEIN: 7.1 G/DL (ref 6.6–8.7)
TRIGL SERPL-MCNC: 533 MG/DL (ref 0–149)
TSH SERPL DL<=0.05 MIU/L-ACNC: 2.35 UIU/ML (ref 0.27–4.2)
WBC # BLD: 11.5 K/UL (ref 4.8–10.8)

## 2018-11-09 ENCOUNTER — TELEPHONE (OUTPATIENT)
Dept: PRIMARY CARE CLINIC | Age: 38
End: 2018-11-09

## 2018-11-09 RX ORDER — ATORVASTATIN CALCIUM 20 MG/1
20 TABLET, FILM COATED ORAL DAILY
Qty: 30 TABLET | Refills: 3 | Status: SHIPPED | OUTPATIENT
Start: 2018-11-09 | End: 2019-03-19 | Stop reason: SDUPTHER

## 2018-11-15 RX ORDER — TIZANIDINE HYDROCHLORIDE 4 MG/1
4 CAPSULE, GELATIN COATED ORAL 3 TIMES DAILY PRN
Qty: 90 CAPSULE | Refills: 1 | Status: SHIPPED | OUTPATIENT
Start: 2018-11-15 | End: 2018-12-18

## 2018-11-16 ENCOUNTER — HOSPITAL ENCOUNTER (OUTPATIENT)
Dept: PAIN MANAGEMENT | Age: 38
Discharge: HOME | End: 2018-11-16
Payer: MEDICAID

## 2018-11-16 VITALS
RESPIRATION RATE: 18 BRPM | HEART RATE: 82 BPM | SYSTOLIC BLOOD PRESSURE: 118 MMHG | TEMPERATURE: 97.2 F | OXYGEN SATURATION: 97 % | DIASTOLIC BLOOD PRESSURE: 74 MMHG

## 2018-11-16 DIAGNOSIS — M51.36 LUMBAR DEGENERATIVE DISC DISEASE: ICD-10-CM

## 2018-11-16 PROCEDURE — 2500000003 HC RX 250 WO HCPCS

## 2018-11-16 PROCEDURE — 62323 NJX INTERLAMINAR LMBR/SAC: CPT

## 2018-11-16 PROCEDURE — 3209999900 FLUORO FOR SURGICAL PROCEDURES

## 2018-11-16 PROCEDURE — 2580000003 HC RX 258

## 2018-11-16 PROCEDURE — 6360000002 HC RX W HCPCS

## 2018-11-16 RX ORDER — LIDOCAINE HYDROCHLORIDE 10 MG/ML
INJECTION, SOLUTION EPIDURAL; INFILTRATION; INTRACAUDAL; PERINEURAL
Status: COMPLETED | OUTPATIENT
Start: 2018-11-16 | End: 2018-11-16

## 2018-11-16 RX ORDER — METHYLPREDNISOLONE ACETATE 80 MG/ML
INJECTION, SUSPENSION INTRA-ARTICULAR; INTRALESIONAL; INTRAMUSCULAR; SOFT TISSUE
Status: COMPLETED | OUTPATIENT
Start: 2018-11-16 | End: 2018-11-16

## 2018-11-16 RX ORDER — 0.9 % SODIUM CHLORIDE 0.9 %
VIAL (ML) INJECTION
Status: COMPLETED | OUTPATIENT
Start: 2018-11-16 | End: 2018-11-16

## 2018-11-16 RX ADMIN — LIDOCAINE HYDROCHLORIDE 5 ML: 10 INJECTION, SOLUTION EPIDURAL; INFILTRATION; INTRACAUDAL; PERINEURAL at 09:46

## 2018-11-16 RX ADMIN — Medication 5 ML: at 09:47

## 2018-11-16 RX ADMIN — METHYLPREDNISOLONE ACETATE 80 MG: 80 INJECTION, SUSPENSION INTRA-ARTICULAR; INTRALESIONAL; INTRAMUSCULAR; SOFT TISSUE at 09:47

## 2018-11-16 ASSESSMENT — PAIN - FUNCTIONAL ASSESSMENT
PAIN_FUNCTIONAL_ASSESSMENT: 0-10

## 2018-11-19 ENCOUNTER — TELEPHONE (OUTPATIENT)
Dept: PAIN MANAGEMENT | Age: 38
End: 2018-11-19

## 2018-11-19 NOTE — TELEPHONE ENCOUNTER
Patient had a procedure with Dr. Syeda Fay on 11/16/18     How are you feeling? Sore - mid to low back and down leg     What is your pain score, on a scale from 1-10? Prior to procedure: 8-9  Now: 7     Do you feel like Dr. Syeda Fay got to the source of your pain? No not really    Did it relieve 80% or more of your pain? No    If it is back pain, does it stay in your back, go down one leg, or both?  One leg - left, radiating stabbing and throbbing pain

## 2018-11-20 ENCOUNTER — TELEPHONE (OUTPATIENT)
Dept: PRIMARY CARE CLINIC | Age: 38
End: 2018-11-20

## 2018-11-26 DIAGNOSIS — F51.01 PRIMARY INSOMNIA: Primary | ICD-10-CM

## 2018-11-27 RX ORDER — ZOLPIDEM TARTRATE 5 MG/1
5 TABLET ORAL NIGHTLY PRN
Qty: 30 TABLET | Refills: 0 | Status: SHIPPED | OUTPATIENT
Start: 2018-11-28 | End: 2018-12-20 | Stop reason: SDUPTHER

## 2018-11-27 RX ORDER — NORTRIPTYLINE HYDROCHLORIDE 75 MG/1
75 CAPSULE ORAL NIGHTLY
Qty: 90 CAPSULE | Refills: 1 | Status: SHIPPED | OUTPATIENT
Start: 2018-11-27 | End: 2019-06-03 | Stop reason: SDUPTHER

## 2018-12-07 ENCOUNTER — CARE COORDINATION (OUTPATIENT)
Dept: CARE COORDINATION | Age: 38
End: 2018-12-07

## 2018-12-07 ENCOUNTER — OFFICE VISIT (OUTPATIENT)
Dept: PRIMARY CARE CLINIC | Age: 38
End: 2018-12-07
Payer: MEDICAID

## 2018-12-07 VITALS
DIASTOLIC BLOOD PRESSURE: 72 MMHG | HEART RATE: 107 BPM | SYSTOLIC BLOOD PRESSURE: 120 MMHG | WEIGHT: 260 LBS | OXYGEN SATURATION: 97 % | HEIGHT: 64 IN | BODY MASS INDEX: 44.39 KG/M2 | RESPIRATION RATE: 12 BRPM | TEMPERATURE: 97.2 F

## 2018-12-07 DIAGNOSIS — G62.9 NEUROPATHY: ICD-10-CM

## 2018-12-07 DIAGNOSIS — M54.50 CHRONIC MIDLINE LOW BACK PAIN WITHOUT SCIATICA: ICD-10-CM

## 2018-12-07 DIAGNOSIS — F32.A ANXIETY AND DEPRESSION: Primary | ICD-10-CM

## 2018-12-07 DIAGNOSIS — G89.29 CHRONIC MIDLINE LOW BACK PAIN WITHOUT SCIATICA: ICD-10-CM

## 2018-12-07 DIAGNOSIS — F41.9 ANXIETY AND DEPRESSION: Primary | ICD-10-CM

## 2018-12-07 DIAGNOSIS — Z79.899 DRUG THERAPY: ICD-10-CM

## 2018-12-07 LAB

## 2018-12-07 PROCEDURE — 99214 OFFICE O/P EST MOD 30 MIN: CPT | Performed by: NURSE PRACTITIONER

## 2018-12-07 PROCEDURE — 80305 DRUG TEST PRSMV DIR OPT OBS: CPT | Performed by: NURSE PRACTITIONER

## 2018-12-07 RX ORDER — DULOXETIN HYDROCHLORIDE 30 MG/1
30 CAPSULE, DELAYED RELEASE ORAL 2 TIMES DAILY
Qty: 60 CAPSULE | Refills: 3 | Status: SHIPPED | OUTPATIENT
Start: 2018-12-07 | End: 2019-06-03 | Stop reason: SDUPTHER

## 2018-12-07 RX ORDER — ARIPIPRAZOLE 15 MG/1
15 TABLET ORAL DAILY
Qty: 30 TABLET | Refills: 5 | Status: SHIPPED | OUTPATIENT
Start: 2018-12-07 | End: 2019-02-21 | Stop reason: ALTCHOICE

## 2018-12-07 RX ORDER — SUMATRIPTAN 100 MG/1
100 TABLET, FILM COATED ORAL
Qty: 9 TABLET | Refills: 2 | Status: SHIPPED | OUTPATIENT
Start: 2018-12-07 | End: 2019-06-09

## 2018-12-07 RX ORDER — IBUPROFEN 800 MG/1
800 TABLET ORAL EVERY 6 HOURS PRN
Qty: 60 TABLET | Refills: 1 | Status: SHIPPED | OUTPATIENT
Start: 2018-12-07 | End: 2019-02-08 | Stop reason: SDUPTHER

## 2018-12-07 ASSESSMENT — ENCOUNTER SYMPTOMS
RESPIRATORY NEGATIVE: 1
BACK PAIN: 1
GASTROINTESTINAL NEGATIVE: 1
EYES NEGATIVE: 1

## 2018-12-18 ENCOUNTER — HOSPITAL ENCOUNTER (EMERGENCY)
Age: 38
Discharge: HOME | End: 2018-12-18
Payer: MEDICAID

## 2018-12-18 ENCOUNTER — OFFICE VISIT (OUTPATIENT)
Dept: URGENT CARE | Age: 38
End: 2018-12-18

## 2018-12-18 ENCOUNTER — APPOINTMENT (OUTPATIENT)
Dept: GENERAL RADIOLOGY | Age: 38
End: 2018-12-18
Payer: MEDICAID

## 2018-12-18 VITALS
OXYGEN SATURATION: 96 % | WEIGHT: 266 LBS | DIASTOLIC BLOOD PRESSURE: 71 MMHG | TEMPERATURE: 98.3 F | HEIGHT: 64 IN | HEART RATE: 96 BPM | SYSTOLIC BLOOD PRESSURE: 104 MMHG | RESPIRATION RATE: 18 BRPM | BODY MASS INDEX: 45.41 KG/M2

## 2018-12-18 DIAGNOSIS — M54.6 ACUTE THORACIC BACK PAIN, UNSPECIFIED BACK PAIN LATERALITY: Primary | ICD-10-CM
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
## 2018-12-18 DIAGNOSIS — S09.90XA INJURY OF HEAD, INITIAL ENCOUNTER: Primary | ICD-10-CM

## 2018-12-18 DIAGNOSIS — S16.1XXA STRAIN OF NECK MUSCLE, INITIAL ENCOUNTER: ICD-10-CM

## 2018-12-18 LAB — HCG(URINE) PREGNANCY TEST: NEGATIVE

## 2018-12-18 PROCEDURE — 96372 THER/PROPH/DIAG INJ SC/IM: CPT

## 2018-12-18 PROCEDURE — 72040 X-RAY EXAM NECK SPINE 2-3 VW: CPT

## 2018-12-18 PROCEDURE — 99283 EMERGENCY DEPT VISIT LOW MDM: CPT | Performed by: NURSE PRACTITIONER

## 2018-12-18 PROCEDURE — 84703 CHORIONIC GONADOTROPIN ASSAY: CPT

## 2018-12-18 PROCEDURE — 6360000002 HC RX W HCPCS: Performed by: NURSE PRACTITIONER

## 2018-12-18 PROCEDURE — 72072 X-RAY EXAM THORAC SPINE 3VWS: CPT

## 2018-12-18 PROCEDURE — 99999 PR OFFICE/OUTPT VISIT,PROCEDURE ONLY: CPT | Performed by: SPECIALIST

## 2018-12-18 PROCEDURE — 99283 EMERGENCY DEPT VISIT LOW MDM: CPT

## 2018-12-18 RX ORDER — NAPROXEN 500 MG/1
500 TABLET ORAL 2 TIMES DAILY
Qty: 20 TABLET | Refills: 0 | Status: SHIPPED | OUTPATIENT
Start: 2018-12-18 | End: 2019-01-08

## 2018-12-18 RX ORDER — ORPHENADRINE CITRATE 30 MG/ML
60 INJECTION INTRAMUSCULAR; INTRAVENOUS ONCE
Status: COMPLETED | OUTPATIENT
Start: 2018-12-18 | End: 2018-12-18

## 2018-12-18 RX ORDER — HYDROMORPHONE HCL 110MG/55ML
1 PATIENT CONTROLLED ANALGESIA SYRINGE INTRAVENOUS ONCE
Status: COMPLETED | OUTPATIENT
Start: 2018-12-18 | End: 2018-12-18

## 2018-12-18 RX ORDER — METHOCARBAMOL 500 MG/1
500 TABLET, FILM COATED ORAL 3 TIMES DAILY
Qty: 15 TABLET | Refills: 0 | Status: SHIPPED | OUTPATIENT
Start: 2018-12-18 | End: 2018-12-23

## 2018-12-18 RX ADMIN — ORPHENADRINE CITRATE 60 MG: 30 INJECTION INTRAMUSCULAR; INTRAVENOUS at 11:48

## 2018-12-18 RX ADMIN — HYDROMORPHONE HYDROCHLORIDE 1 MG: 2 INJECTION, SOLUTION INTRAMUSCULAR; INTRAVENOUS; SUBCUTANEOUS at 11:49

## 2018-12-18 ASSESSMENT — PAIN SCALES - GENERAL
PAINLEVEL_OUTOF10: 9
PAINLEVEL_OUTOF10: 9

## 2018-12-18 ASSESSMENT — ENCOUNTER SYMPTOMS
RESPIRATORY NEGATIVE: 1
GASTROINTESTINAL NEGATIVE: 1
BACK PAIN: 1

## 2018-12-18 NOTE — ED PROVIDER NOTES
Details   DULoxetine (CYMBALTA) 30 MG extended release capsule Take 1 capsule by mouth 2 times daily, Disp-60 capsule, R-3Normal      SUMAtriptan (IMITREX) 100 MG tablet Take 1 tablet by mouth once as needed for Migraine, Disp-9 tablet, R-2Normal      ibuprofen (ADVIL;MOTRIN) 800 MG tablet Take 1 tablet by mouth every 6 hours as needed for Pain, Disp-60 tablet, R-1Normal      ARIPiprazole (ABILIFY) 15 MG tablet Take 1 tablet by mouth daily, Disp-30 tablet, R-5Normal      zolpidem (AMBIEN) 5 MG tablet Take 1 tablet by mouth nightly as needed for Sleep for up to 30 days. ., Disp-30 tablet, R-0Normal      nortriptyline (PAMELOR) 75 MG capsule Take 1 capsule by mouth nightly, Disp-90 capsule, R-1Normal      atorvastatin (LIPITOR) 20 MG tablet Take 1 tablet by mouth daily, Disp-30 tablet, R-3Normal      albuterol sulfate HFA (VENTOLIN HFA) 108 (90 Base) MCG/ACT inhaler Inhale 2 puffs into the lungs every 6 hours as needed for Wheezing, Disp-1 Inhaler, R-2Normal      prazosin (MINIPRESS) 1 MG capsule Take 1 capsule by mouth nightly, Disp-30 capsule, R-3Normal      verapamil (CALAN SR) 180 MG extended release tablet Take 1 tablet by mouth nightly, Disp-30 tablet, R-1Normal             ALLERGIES     Pcn [penicillins] and Sulfa antibiotics    FAMILY HISTORY     History reviewed. No pertinent family history.        SOCIAL HISTORY       Social History     Social History    Marital status:      Spouse name: N/A    Number of children: N/A    Years of education: N/A     Social History Main Topics    Smoking status: Former Smoker     Packs/day: 1.00     Years: 10.00     Types: Cigarettes     Quit date: 12/2017    Smokeless tobacco: Never Used    Alcohol use No    Drug use: No      Comment: Last used on 7/25/18    Sexual activity: Not Asked     Other Topics Concern    None     Social History Narrative    None       SCREENINGS             PHYSICAL EXAM    (up to 7 for level 4, 8 or more for level 5)     ED Triage

## 2018-12-18 NOTE — ED TRIAGE NOTES
Pt reports to ED c/o neck pain, back pain and head pain after slipping on ice and having ground level fall yesterday

## 2018-12-20 DIAGNOSIS — F51.01 PRIMARY INSOMNIA: ICD-10-CM

## 2018-12-20 RX ORDER — ZOLPIDEM TARTRATE 5 MG/1
5 TABLET ORAL NIGHTLY PRN
Qty: 30 TABLET | Refills: 0 | Status: SHIPPED | OUTPATIENT
Start: 2018-12-27 | End: 2019-02-05 | Stop reason: SDUPTHER

## 2018-12-20 NOTE — TELEPHONE ENCOUNTER
Elizabeth called 12/20/18 with request for refill on Ambien. Last office visit 12/07/18 with next scheduled appointment 01/08/19  Dose verified.    Last UDS  12/07/18    Last fill per 11/27/18  Cathy Rosas Report 12/20/18 12/7/2018  9:35 AM - Chalo Palm MA     Component Results     Component Collected Lab   Amphetamine Screen, Urine 12/07/2018  9:35 AM Unknown   N    Barbiturate Screen, Urine 12/07/2018  9:35 AM Unknown   N    Benzodiazepine Screen, Urine 12/07/2018  9:35 AM Unknown   N    Buprenorphine Urine 12/07/2018  9:35 AM Unknown   N    Cocaine Metabolite Screen, Urine 12/07/2018  9:35 AM Unknown   N    Gabapentin Screen, Urine 12/07/2018  9:35 AM Unknown   N    Methamphetamine, Urine 12/07/2018  9:35 AM Unknown   N    Methadone Screen, Urine 12/07/2018  9:35 AM Unknown   N    Opiate Scrn, Ur 12/07/2018  9:35 AM Unknown   N    Oxycodone Screen, Ur 12/07/2018  9:35 AM Unknown   N    PCP Screen, Urine 12/07/2018  9:35 AM Unknown   N    Propoxyphene Screen, Urine 12/07/2018  9:35 AM Unknown   N    THC Screen, Urine 12/07/2018  9:35 AM Unknown   N    Tricyclic Antidepressants, Urine 12/07/2018  9:35 AM Unknown   Pos    Lab and Collection     POCT Rapid Drug Screen on 12/7/2018

## 2018-12-21 ENCOUNTER — OFFICE VISIT (OUTPATIENT)
Dept: PSYCHIATRY | Age: 38
End: 2018-12-21
Payer: MEDICAID

## 2018-12-21 VITALS
BODY MASS INDEX: 44.9 KG/M2 | OXYGEN SATURATION: 97 % | HEART RATE: 118 BPM | WEIGHT: 263 LBS | HEIGHT: 64 IN | SYSTOLIC BLOOD PRESSURE: 129 MMHG | DIASTOLIC BLOOD PRESSURE: 89 MMHG

## 2018-12-21 DIAGNOSIS — F31.9 BIPOLAR 1 DISORDER (HCC): Primary | ICD-10-CM

## 2018-12-21 DIAGNOSIS — F41.1 GAD (GENERALIZED ANXIETY DISORDER): ICD-10-CM

## 2018-12-21 PROCEDURE — 90791 PSYCH DIAGNOSTIC EVALUATION: CPT | Performed by: COUNSELOR
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
## 2019-01-02 DIAGNOSIS — R19.7 DIARRHEA, UNSPECIFIED TYPE: ICD-10-CM

## 2019-01-02 RX ORDER — DIPHENOXYLATE HYDROCHLORIDE AND ATROPINE SULFATE 2.5; .025 MG/1; MG/1
1 TABLET ORAL 4 TIMES DAILY PRN
Qty: 120 TABLET | Refills: 1 | Status: SHIPPED | OUTPATIENT
Start: 2019-01-02 | End: 2019-03-01 | Stop reason: SDUPTHER

## 2019-01-08 ENCOUNTER — HOSPITAL ENCOUNTER (OUTPATIENT)
Dept: PAIN MANAGEMENT | Age: 39
Discharge: HOME | End: 2019-01-08
Payer: MEDICAID

## 2019-01-08 VITALS
BODY MASS INDEX: 44.39 KG/M2 | SYSTOLIC BLOOD PRESSURE: 135 MMHG | WEIGHT: 260 LBS | RESPIRATION RATE: 18 BRPM | DIASTOLIC BLOOD PRESSURE: 95 MMHG | HEART RATE: 88 BPM | TEMPERATURE: 97.5 F | HEIGHT: 64 IN | OXYGEN SATURATION: 98 %

## 2019-01-08 DIAGNOSIS — M79.18 MYOFASCIAL PAIN: Primary | ICD-10-CM

## 2019-01-08 PROCEDURE — 99214 OFFICE O/P EST MOD 30 MIN: CPT | Performed by: NURSE PRACTITIONER

## 2019-01-08 PROCEDURE — 99213 OFFICE O/P EST LOW 20 MIN: CPT

## 2019-01-08 RX ORDER — TIZANIDINE 4 MG/1
TABLET ORAL
Refills: 1 | COMMUNITY
Start: 2018-12-12 | End: 2019-03-05 | Stop reason: SDUPTHER

## 2019-01-08 RX ORDER — TIZANIDINE 4 MG/1
4 TABLET ORAL 3 TIMES DAILY
Qty: 90 TABLET | Refills: 1 | Status: SHIPPED | OUTPATIENT
Start: 2019-01-08 | End: 2019-02-12 | Stop reason: SDUPTHER

## 2019-01-08 ASSESSMENT — ACTIVITIES OF DAILY LIVING (ADL): EFFECT OF PAIN ON DAILY ACTIVITIES: LIMITS ACTIVITY

## 2019-01-08 ASSESSMENT — ENCOUNTER SYMPTOMS
NAUSEA: 0
BOWEL INCONTINENCE: 0
WHEEZING: 0
DIARRHEA: 0
EYE REDNESS: 0
EYE PAIN: 0
CONSTIPATION: 0
SHORTNESS OF BREATH: 0
BACK PAIN: 1
SORE THROAT: 0

## 2019-01-08 ASSESSMENT — PAIN DESCRIPTION - ORIENTATION: ORIENTATION: LOWER;MID

## 2019-01-08 ASSESSMENT — PAIN DESCRIPTION - DIRECTION: RADIATING_TOWARDS: INTO LEFT LOWER EXTREMITY

## 2019-01-08 ASSESSMENT — PAIN DESCRIPTION - DESCRIPTORS: DESCRIPTORS: CONSTANT;RADIATING;THROBBING

## 2019-01-08 ASSESSMENT — PAIN DESCRIPTION - ONSET: ONSET: ON-GOING

## 2019-01-08 ASSESSMENT — PAIN DESCRIPTION - LOCATION: LOCATION: BACK

## 2019-01-08 ASSESSMENT — PAIN SCALES - GENERAL: PAINLEVEL_OUTOF10: 7

## 2019-01-08 ASSESSMENT — PAIN DESCRIPTION - FREQUENCY: FREQUENCY: CONTINUOUS

## 2019-01-08 ASSESSMENT — PAIN DESCRIPTION - PAIN TYPE: TYPE: CHRONIC PAIN

## 2019-01-08 ASSESSMENT — PAIN DESCRIPTION - PROGRESSION: CLINICAL_PROGRESSION: NOT CHANGED

## 2019-01-16 DIAGNOSIS — G43.709 CHRONIC MIGRAINE WITHOUT AURA WITHOUT STATUS MIGRAINOSUS, NOT INTRACTABLE: ICD-10-CM

## 2019-02-05 ENCOUNTER — TELEPHONE (OUTPATIENT)
Dept: PRIMARY CARE CLINIC | Age: 39
End: 2019-02-05

## 2019-02-05 ENCOUNTER — HOSPITAL ENCOUNTER (OUTPATIENT)
Dept: GENERAL RADIOLOGY | Age: 39
Discharge: HOME | End: 2019-02-05
Payer: MEDICAID

## 2019-02-05 ENCOUNTER — OFFICE VISIT (OUTPATIENT)
Dept: PRIMARY CARE CLINIC | Age: 39
End: 2019-02-05
Payer: MEDICAID

## 2019-02-05 VITALS
SYSTOLIC BLOOD PRESSURE: 110 MMHG | WEIGHT: 273 LBS | OXYGEN SATURATION: 98 % | TEMPERATURE: 98 F | BODY MASS INDEX: 46.61 KG/M2 | DIASTOLIC BLOOD PRESSURE: 80 MMHG | HEIGHT: 64 IN | HEART RATE: 93 BPM

## 2019-02-05 DIAGNOSIS — F51.01 PRIMARY INSOMNIA: ICD-10-CM

## 2019-02-05 DIAGNOSIS — M25.511 ACUTE PAIN OF RIGHT SHOULDER: ICD-10-CM

## 2019-02-05 DIAGNOSIS — M25.511 ACUTE PAIN OF RIGHT SHOULDER: Primary | ICD-10-CM

## 2019-02-05 PROCEDURE — 73030 X-RAY EXAM OF SHOULDER: CPT

## 2019-02-05 PROCEDURE — 99213 OFFICE O/P EST LOW 20 MIN: CPT | Performed by: NURSE PRACTITIONER

## 2019-02-05 RX ORDER — ZOLPIDEM TARTRATE 5 MG/1
5 TABLET ORAL NIGHTLY PRN
Qty: 30 TABLET | Refills: 1 | Status: SHIPPED | OUTPATIENT
Start: 2019-02-05 | End: 2019-04-02 | Stop reason: SDUPTHER

## 2019-02-05 ASSESSMENT — ENCOUNTER SYMPTOMS
GASTROINTESTINAL NEGATIVE: 1
EYES NEGATIVE: 1
RESPIRATORY NEGATIVE: 1

## 2019-02-08 DIAGNOSIS — G62.9 NEUROPATHY: ICD-10-CM

## 2019-02-08 RX ORDER — IBUPROFEN 800 MG/1
800 TABLET ORAL EVERY 6 HOURS PRN
Qty: 60 TABLET | Refills: 1 | Status: SHIPPED | OUTPATIENT
Start: 2019-02-08

## 2019-02-12 ENCOUNTER — HOSPITAL ENCOUNTER (EMERGENCY)
Age: 39
Discharge: HOME | End: 2019-02-12
Attending: EMERGENCY MEDICINE
Payer: MEDICAID

## 2019-02-12 ENCOUNTER — APPOINTMENT (OUTPATIENT)
Dept: GENERAL RADIOLOGY | Age: 39
End: 2019-02-12
Payer: MEDICAID

## 2019-02-12 VITALS
RESPIRATION RATE: 17 BRPM | HEART RATE: 116 BPM | OXYGEN SATURATION: 96 % | WEIGHT: 270 LBS | BODY MASS INDEX: 46.1 KG/M2 | TEMPERATURE: 97.9 F | SYSTOLIC BLOOD PRESSURE: 137 MMHG | HEIGHT: 64 IN | DIASTOLIC BLOOD PRESSURE: 91 MMHG

## 2019-02-12 DIAGNOSIS — R07.9 CHEST PAIN, UNSPECIFIED TYPE: Primary | ICD-10-CM

## 2019-02-12 LAB
ALBUMIN SERPL-MCNC: 4.2 G/DL (ref 3.5–5.2)
ALP BLD-CCNC: 89 U/L (ref 35–104)
ALT SERPL-CCNC: 23 U/L (ref 5–33)
ANION GAP SERPL CALCULATED.3IONS-SCNC: 12 MMOL/L (ref 7–19)
APTT: 24.1 SEC (ref 26–36.2)
AST SERPL-CCNC: 20 U/L (ref 5–32)
BASOPHILS ABSOLUTE: 0 K/UL (ref 0–0.2)
BASOPHILS RELATIVE PERCENT: 0.4 % (ref 0–1)
BILIRUB SERPL-MCNC: <0.2 MG/DL (ref 0.2–1.2)
BUN BLDV-MCNC: 11 MG/DL (ref 6–20)
CALCIUM SERPL-MCNC: 8.9 MG/DL (ref 8.6–10)
CHLORIDE BLD-SCNC: 109 MMOL/L (ref 98–111)
CO2: 20 MMOL/L (ref 22–29)
CREAT SERPL-MCNC: 1 MG/DL (ref 0.5–0.9)
EOSINOPHILS ABSOLUTE: 0.1 K/UL (ref 0–0.6)
EOSINOPHILS RELATIVE PERCENT: 0.9 % (ref 0–5)
GFR NON-AFRICAN AMERICAN: >60
GLUCOSE BLD-MCNC: 115 MG/DL (ref 74–109)
HCG QUALITATIVE: NEGATIVE
HCT VFR BLD CALC: 44.3 % (ref 37–47)
HEMOGLOBIN: 13.7 G/DL (ref 12–16)
INR BLD: 0.95 (ref 0.88–1.18)
LIPASE: 32 U/L (ref 13–60)
LYMPHOCYTES ABSOLUTE: 3.1 K/UL (ref 1.1–4.5)
LYMPHOCYTES RELATIVE PERCENT: 36.7 % (ref 20–40)
MCH RBC QN AUTO: 29.3 PG (ref 27–31)
MCHC RBC AUTO-ENTMCNC: 30.9 G/DL (ref 33–37)
MCV RBC AUTO: 94.7 FL (ref 81–99)
MONOCYTES ABSOLUTE: 0.4 K/UL (ref 0–0.9)
MONOCYTES RELATIVE PERCENT: 4.7 % (ref 0–10)
NEUTROPHILS ABSOLUTE: 4.8 K/UL (ref 1.5–7.5)
NEUTROPHILS RELATIVE PERCENT: 56.6 % (ref 50–65)
PDW BLD-RTO: 11.8 % (ref 11.5–14.5)
PLATELET # BLD: 328 K/UL (ref 130–400)
PMV BLD AUTO: 9.6 FL (ref 9.4–12.3)
POTASSIUM SERPL-SCNC: 4 MMOL/L (ref 3.5–5)
PRO-BNP: 306 PG/ML (ref 0–450)
PROTHROMBIN TIME: 12.1 SEC (ref 12–14.6)
RBC # BLD: 4.68 M/UL (ref 4.2–5.4)
SODIUM BLD-SCNC: 141 MMOL/L (ref 136–145)
TOTAL PROTEIN: 7.6 G/DL (ref 6.6–8.7)
TROPONIN: <0.01 NG/ML (ref 0–0.03)
TROPONIN: <0.01 NG/ML (ref 0–0.03)
WBC # BLD: 8.5 K/UL (ref 4.8–10.8)

## 2019-02-12 PROCEDURE — 85025 COMPLETE CBC W/AUTO DIFF WBC: CPT

## 2019-02-12 PROCEDURE — 93005 ELECTROCARDIOGRAM TRACING: CPT

## 2019-02-12 PROCEDURE — 99285 EMERGENCY DEPT VISIT HI MDM: CPT

## 2019-02-12 PROCEDURE — 85610 PROTHROMBIN TIME: CPT

## 2019-02-12 PROCEDURE — 96374 THER/PROPH/DIAG INJ IV PUSH: CPT

## 2019-02-12 PROCEDURE — 80053 COMPREHEN METABOLIC PANEL: CPT

## 2019-02-12 PROCEDURE — 85730 THROMBOPLASTIN TIME PARTIAL: CPT

## 2019-02-12 PROCEDURE — 83690 ASSAY OF LIPASE: CPT

## 2019-02-12 PROCEDURE — 99284 EMERGENCY DEPT VISIT MOD MDM: CPT | Performed by: EMERGENCY MEDICINE

## 2019-02-12 PROCEDURE — 84484 ASSAY OF TROPONIN QUANT: CPT

## 2019-02-12 PROCEDURE — 96375 TX/PRO/DX INJ NEW DRUG ADDON: CPT

## 2019-02-12 PROCEDURE — 36415 COLL VENOUS BLD VENIPUNCTURE: CPT

## 2019-02-12 PROCEDURE — 6360000002 HC RX W HCPCS: Performed by: EMERGENCY MEDICINE

## 2019-02-12 PROCEDURE — 83880 ASSAY OF NATRIURETIC PEPTIDE: CPT

## 2019-02-12 PROCEDURE — 71045 X-RAY EXAM CHEST 1 VIEW: CPT

## 2019-02-12 PROCEDURE — 84703 CHORIONIC GONADOTROPIN ASSAY: CPT

## 2019-02-12 PROCEDURE — 6370000000 HC RX 637 (ALT 250 FOR IP): Performed by: EMERGENCY MEDICINE

## 2019-02-12 RX ORDER — MORPHINE SULFATE 2 MG/ML
4 INJECTION, SOLUTION INTRAMUSCULAR; INTRAVENOUS ONCE
Status: COMPLETED | OUTPATIENT
Start: 2019-02-12 | End: 2019-02-12

## 2019-02-12 RX ORDER — ASPIRIN 325 MG
325 TABLET ORAL ONCE
Status: COMPLETED | OUTPATIENT
Start: 2019-02-12 | End: 2019-02-12

## 2019-02-12 RX ORDER — ONDANSETRON 2 MG/ML
4 INJECTION INTRAMUSCULAR; INTRAVENOUS ONCE
Status: COMPLETED | OUTPATIENT
Start: 2019-02-12 | End: 2019-02-12

## 2019-02-12 RX ADMIN — ASPIRIN 325 MG ORAL TABLET 325 MG: 325 PILL ORAL at 15:19

## 2019-02-12 RX ADMIN — MORPHINE SULFATE 4 MG: 2 INJECTION, SOLUTION INTRAMUSCULAR; INTRAVENOUS at 15:19

## 2019-02-12 RX ADMIN — ONDANSETRON 4 MG: 2 INJECTION INTRAMUSCULAR; INTRAVENOUS at 15:19

## 2019-02-12 ASSESSMENT — ENCOUNTER SYMPTOMS
BACK PAIN: 0
VOMITING: 0
SORE THROAT: 0
DIARRHEA: 0
SHORTNESS OF BREATH: 1
ABDOMINAL PAIN: 0
RHINORRHEA: 0
NAUSEA: 1

## 2019-02-12 ASSESSMENT — PAIN SCALES - GENERAL
PAINLEVEL_OUTOF10: 8
PAINLEVEL_OUTOF10: 8

## 2019-02-12 ASSESSMENT — HEART SCORE: ECG: 0

## 2019-02-13 ENCOUNTER — HOSPITAL ENCOUNTER (OUTPATIENT)
Dept: PAIN MANAGEMENT | Age: 39
Discharge: HOME | End: 2019-02-13
Payer: MEDICAID

## 2019-02-13 VITALS
RESPIRATION RATE: 16 BRPM | OXYGEN SATURATION: 100 % | HEART RATE: 95 BPM | TEMPERATURE: 96.7 F | DIASTOLIC BLOOD PRESSURE: 68 MMHG | SYSTOLIC BLOOD PRESSURE: 115 MMHG

## 2019-02-13 PROCEDURE — 20553 NJX 1/MLT TRIGGER POINTS 3/>: CPT | Performed by: NURSE PRACTITIONER

## 2019-02-13 PROCEDURE — 2500000003 HC RX 250 WO HCPCS

## 2019-02-13 PROCEDURE — 20553 NJX 1/MLT TRIGGER POINTS 3/>: CPT

## 2019-02-13 RX ORDER — BUPIVACAINE HYDROCHLORIDE 5 MG/ML
INJECTION, SOLUTION EPIDURAL; INTRACAUDAL
Status: COMPLETED | OUTPATIENT
Start: 2019-02-13 | End: 2019-02-13

## 2019-02-13 RX ORDER — LIDOCAINE HYDROCHLORIDE 10 MG/ML
INJECTION, SOLUTION EPIDURAL; INFILTRATION; INTRACAUDAL; PERINEURAL
Status: COMPLETED | OUTPATIENT
Start: 2019-02-13 | End: 2019-02-13

## 2019-02-13 RX ADMIN — LIDOCAINE HYDROCHLORIDE 15 ML: 10 INJECTION, SOLUTION EPIDURAL; INFILTRATION; INTRACAUDAL; PERINEURAL at 08:29

## 2019-02-13 RX ADMIN — BUPIVACAINE HYDROCHLORIDE 15 ML: 5 INJECTION, SOLUTION EPIDURAL; INTRACAUDAL at 08:28

## 2019-02-13 ASSESSMENT — PAIN - FUNCTIONAL ASSESSMENT: PAIN_FUNCTIONAL_ASSESSMENT: 0-10

## 2019-02-14 ENCOUNTER — OFFICE VISIT (OUTPATIENT)
Dept: PRIMARY CARE CLINIC | Age: 39
End: 2019-02-14
Payer: MEDICAID

## 2019-02-14 VITALS
SYSTOLIC BLOOD PRESSURE: 136 MMHG | DIASTOLIC BLOOD PRESSURE: 68 MMHG | WEIGHT: 274.8 LBS | HEIGHT: 64 IN | TEMPERATURE: 97.5 F | OXYGEN SATURATION: 98 % | BODY MASS INDEX: 46.92 KG/M2 | HEART RATE: 81 BPM

## 2019-02-14 DIAGNOSIS — Z82.49 FAMILY HISTORY OF MI (MYOCARDIAL INFARCTION): ICD-10-CM

## 2019-02-14 DIAGNOSIS — E66.01 CLASS 3 SEVERE OBESITY DUE TO EXCESS CALORIES WITH SERIOUS COMORBIDITY AND BODY MASS INDEX (BMI) OF 45.0 TO 49.9 IN ADULT (HCC): ICD-10-CM

## 2019-02-14 DIAGNOSIS — R07.9 CHEST PAIN, UNSPECIFIED TYPE: Primary | ICD-10-CM

## 2019-02-14 PROCEDURE — 96160 PT-FOCUSED HLTH RISK ASSMT: CPT | Performed by: NURSE PRACTITIONER

## 2019-02-14 PROCEDURE — 99214 OFFICE O/P EST MOD 30 MIN: CPT | Performed by: NURSE PRACTITIONER

## 2019-02-14 ASSESSMENT — PATIENT HEALTH QUESTIONNAIRE - PHQ9
7. TROUBLE CONCENTRATING ON THINGS, SUCH AS READING THE NEWSPAPER OR WATCHING TELEVISION: 0
10. IF YOU CHECKED OFF ANY PROBLEMS, HOW DIFFICULT HAVE THESE PROBLEMS MADE IT FOR YOU TO DO YOUR WORK, TAKE CARE OF THINGS AT HOME, OR GET ALONG WITH OTHER PEOPLE: 2
SUM OF ALL RESPONSES TO PHQ9 QUESTIONS 1 & 2: 6
5. POOR APPETITE OR OVEREATING: 2
3. TROUBLE FALLING OR STAYING ASLEEP: 1
6. FEELING BAD ABOUT YOURSELF - OR THAT YOU ARE A FAILURE OR HAVE LET YOURSELF OR YOUR FAMILY DOWN: 2
9. THOUGHTS THAT YOU WOULD BE BETTER OFF DEAD, OR OF HURTING YOURSELF: 0
SUM OF ALL RESPONSES TO PHQ QUESTIONS 1-9: 16
8. MOVING OR SPEAKING SO SLOWLY THAT OTHER PEOPLE COULD HAVE NOTICED. OR THE OPPOSITE, BEING SO FIGETY OR RESTLESS THAT YOU HAVE BEEN MOVING AROUND A LOT MORE THAN USUAL: 2
1. LITTLE INTEREST OR PLEASURE IN DOING THINGS: 3
4. FEELING TIRED OR HAVING LITTLE ENERGY: 3
SUM OF ALL RESPONSES TO PHQ QUESTIONS 1-9: 16
2. FEELING DOWN, DEPRESSED OR HOPELESS: 3

## 2019-02-15 ENCOUNTER — TELEPHONE (OUTPATIENT)
Dept: PAIN MANAGEMENT | Age: 39
End: 2019-02-15

## 2019-02-16 ASSESSMENT — ENCOUNTER SYMPTOMS
ABDOMINAL PAIN: 0
DIARRHEA: 0
VOMITING: 0
SHORTNESS OF BREATH: 0
COUGH: 0
NAUSEA: 1
BACK PAIN: 0
SINUS PRESSURE: 0
EYE PAIN: 0
WHEEZING: 0

## 2019-02-19 ENCOUNTER — OFFICE VISIT (OUTPATIENT)
Dept: PSYCHIATRY | Age: 39
End: 2019-02-19
Payer: MEDICAID

## 2019-02-19 DIAGNOSIS — F31.9 BIPOLAR 1 DISORDER (HCC): Primary | ICD-10-CM

## 2019-02-19 DIAGNOSIS — F41.1 GAD (GENERALIZED ANXIETY DISORDER): ICD-10-CM

## 2019-02-19 LAB
EKG P AXIS: 18 DEGREES
EKG P AXIS: 25 DEGREES
EKG P AXIS: 30 DEGREES
EKG P-R INTERVAL: 142 MS
EKG P-R INTERVAL: 152 MS
EKG P-R INTERVAL: 164 MS
EKG Q-T INTERVAL: 350 MS
EKG Q-T INTERVAL: 366 MS
EKG Q-T INTERVAL: 388 MS
EKG QRS DURATION: 86 MS
EKG QRS DURATION: 88 MS
EKG QRS DURATION: 90 MS
EKG QTC CALCULATION (BAZETT): 423 MS
EKG QTC CALCULATION (BAZETT): 425 MS
EKG QTC CALCULATION (BAZETT): 430 MS
EKG T AXIS: 14 DEGREES
EKG T AXIS: 20 DEGREES
EKG T AXIS: 27 DEGREES

## 2019-02-19 PROCEDURE — 90832 PSYTX W PT 30 MINUTES: CPT | Performed by: COUNSELOR

## 2019-02-21 ENCOUNTER — HOSPITAL ENCOUNTER (OUTPATIENT)
Dept: NON INVASIVE DIAGNOSTICS | Age: 39
Discharge: HOME | End: 2019-02-21
Payer: MEDICAID

## 2019-02-21 ENCOUNTER — OFFICE VISIT (OUTPATIENT)
Dept: PSYCHIATRY | Age: 39
End: 2019-02-21
Payer: MEDICAID

## 2019-02-21 VITALS
WEIGHT: 270 LBS | BODY MASS INDEX: 46.1 KG/M2 | HEIGHT: 64 IN | HEART RATE: 108 BPM | SYSTOLIC BLOOD PRESSURE: 152 MMHG | OXYGEN SATURATION: 97 % | DIASTOLIC BLOOD PRESSURE: 106 MMHG

## 2019-02-21 DIAGNOSIS — R07.9 CHEST PAIN, UNSPECIFIED TYPE: ICD-10-CM

## 2019-02-21 DIAGNOSIS — F31.5 BIPOLAR I DISORDER, CURRENT OR MOST RECENT EPISODE DEPRESSED, WITH PSYCHOTIC FEATURES (HCC): Primary | ICD-10-CM

## 2019-02-21 DIAGNOSIS — Z82.49 FAMILY HISTORY OF MI (MYOCARDIAL INFARCTION): ICD-10-CM

## 2019-02-21 LAB
LV EF: 45 %
LVEF MODALITY: NORMAL

## 2019-02-21 PROCEDURE — 99205 OFFICE O/P NEW HI 60 MIN: CPT | Performed by: CLINICAL NURSE SPECIALIST

## 2019-02-21 PROCEDURE — 93350 STRESS TTE ONLY: CPT

## 2019-02-22 ENCOUNTER — TELEPHONE (OUTPATIENT)
Dept: PRIMARY CARE CLINIC | Age: 39
End: 2019-02-22

## 2019-02-26 ENCOUNTER — TELEPHONE (OUTPATIENT)
Dept: PAIN MANAGEMENT | Age: 39
End: 2019-02-26

## 2019-02-28 ENCOUNTER — OFFICE VISIT (OUTPATIENT)
Dept: CARDIOLOGY | Age: 39
End: 2019-02-28
Payer: MEDICAID

## 2019-02-28 VITALS
BODY MASS INDEX: 47.46 KG/M2 | HEART RATE: 98 BPM | WEIGHT: 278 LBS | HEIGHT: 64 IN | DIASTOLIC BLOOD PRESSURE: 74 MMHG | SYSTOLIC BLOOD PRESSURE: 116 MMHG

## 2019-02-28 DIAGNOSIS — I10 ESSENTIAL HYPERTENSION: ICD-10-CM

## 2019-02-28 DIAGNOSIS — R07.9 CHEST PAIN, UNSPECIFIED TYPE: Primary | ICD-10-CM

## 2019-02-28 PROCEDURE — 99214 OFFICE O/P EST MOD 30 MIN: CPT | Performed by: NURSE PRACTITIONER

## 2019-03-01 DIAGNOSIS — R19.7 DIARRHEA, UNSPECIFIED TYPE: ICD-10-CM

## 2019-03-03 RX ORDER — DIPHENOXYLATE HYDROCHLORIDE AND ATROPINE SULFATE 2.5; .025 MG/1; MG/1
1 TABLET ORAL 4 TIMES DAILY PRN
Qty: 120 TABLET | Refills: 1 | Status: SHIPPED | OUTPATIENT
Start: 2019-03-03 | End: 2019-05-01 | Stop reason: SDUPTHER

## 2019-03-05 RX ORDER — TIZANIDINE HYDROCHLORIDE 4 MG/1
4 CAPSULE, GELATIN COATED ORAL 3 TIMES DAILY PRN
Qty: 90 CAPSULE | Refills: 0 | Status: SHIPPED | OUTPATIENT
Start: 2019-03-05 | End: 2019-03-12

## 2019-03-06 ENCOUNTER — HOSPITAL ENCOUNTER (OUTPATIENT)
Dept: CARDIAC CATH/INVASIVE PROCEDURES | Age: 39
Discharge: HOME | End: 2019-03-06
Attending: INTERNAL MEDICINE | Admitting: INTERNAL MEDICINE
Payer: MEDICAID

## 2019-03-06 VITALS
RESPIRATION RATE: 16 BRPM | WEIGHT: 270 LBS | HEIGHT: 64 IN | TEMPERATURE: 97.7 F | BODY MASS INDEX: 46.1 KG/M2 | SYSTOLIC BLOOD PRESSURE: 145 MMHG | HEART RATE: 54 BPM | DIASTOLIC BLOOD PRESSURE: 47 MMHG | OXYGEN SATURATION: 100 %

## 2019-03-06 DIAGNOSIS — R94.39 ABNORMAL STRESS ECHO: ICD-10-CM

## 2019-03-06 PROBLEM — R07.89 CHEST DISCOMFORT: Status: ACTIVE | Noted: 2019-03-06

## 2019-03-06 LAB — HCG(URINE) PREGNANCY TEST: NEGATIVE

## 2019-03-06 PROCEDURE — C1760 CLOSURE DEV, VASC: HCPCS

## 2019-03-06 PROCEDURE — C1894 INTRO/SHEATH, NON-LASER: HCPCS

## 2019-03-06 PROCEDURE — 2709999900 HC NON-CHARGEABLE SUPPLY

## 2019-03-06 PROCEDURE — 2580000003 HC RX 258: Performed by: INTERNAL MEDICINE

## 2019-03-06 PROCEDURE — 93458 L HRT ARTERY/VENTRICLE ANGIO: CPT | Performed by: INTERNAL MEDICINE

## 2019-03-06 PROCEDURE — 99152 MOD SED SAME PHYS/QHP 5/>YRS: CPT | Performed by: INTERNAL MEDICINE

## 2019-03-06 PROCEDURE — 99217 PR OBSERVATION CARE DISCHARGE MANAGEMENT: CPT | Performed by: INTERNAL MEDICINE

## 2019-03-06 PROCEDURE — 84703 CHORIONIC GONADOTROPIN ASSAY: CPT

## 2019-03-06 PROCEDURE — 6360000004 HC RX CONTRAST MEDICATION: Performed by: INTERNAL MEDICINE

## 2019-03-06 PROCEDURE — 6360000002 HC RX W HCPCS

## 2019-03-06 RX ORDER — SODIUM CHLORIDE 9 MG/ML
INJECTION, SOLUTION INTRAVENOUS CONTINUOUS
Status: DISCONTINUED | OUTPATIENT
Start: 2019-03-06 | End: 2019-03-06 | Stop reason: HOSPADM

## 2019-03-06 RX ADMIN — SODIUM CHLORIDE: 9 INJECTION, SOLUTION INTRAVENOUS at 11:42

## 2019-03-06 RX ADMIN — IOPAMIDOL 76 ML: 612 INJECTION, SOLUTION INTRAVENOUS at 15:55

## 2019-03-12 RX ORDER — TIZANIDINE 4 MG/1
4 TABLET ORAL 3 TIMES DAILY PRN
Qty: 90 TABLET | Refills: 0 | Status: SHIPPED | OUTPATIENT
Start: 2019-03-12 | End: 2019-04-08 | Stop reason: SDUPTHER

## 2019-03-13 ENCOUNTER — OFFICE VISIT (OUTPATIENT)
Dept: PSYCHIATRY | Age: 39
End: 2019-03-13
Payer: MEDICAID

## 2019-03-13 VITALS
DIASTOLIC BLOOD PRESSURE: 80 MMHG | OXYGEN SATURATION: 98 % | HEART RATE: 81 BPM | BODY MASS INDEX: 46.1 KG/M2 | WEIGHT: 270 LBS | SYSTOLIC BLOOD PRESSURE: 115 MMHG | HEIGHT: 64 IN

## 2019-03-13 DIAGNOSIS — F31.5 BIPOLAR I DISORDER, CURRENT OR MOST RECENT EPISODE DEPRESSED, WITH PSYCHOTIC FEATURES (HCC): Primary | ICD-10-CM

## 2019-03-13 DIAGNOSIS — F41.1 GAD (GENERALIZED ANXIETY DISORDER): ICD-10-CM

## 2019-03-13 PROCEDURE — 90832 PSYTX W PT 30 MINUTES: CPT | Performed by: COUNSELOR

## 2019-03-19 LAB
LV EF: 60 %
LVEF MODALITY: NORMAL

## 2019-03-19 RX ORDER — ATORVASTATIN CALCIUM 20 MG/1
20 TABLET, FILM COATED ORAL DAILY
Qty: 30 TABLET | Refills: 5 | Status: SHIPPED | OUTPATIENT
Start: 2019-03-19

## 2019-03-20 ENCOUNTER — HOSPITAL ENCOUNTER (OUTPATIENT)
Dept: PAIN MANAGEMENT | Age: 39
Discharge: HOME | End: 2019-03-20
Payer: MEDICAID

## 2019-03-20 VITALS
RESPIRATION RATE: 16 BRPM | HEIGHT: 64 IN | DIASTOLIC BLOOD PRESSURE: 86 MMHG | OXYGEN SATURATION: 99 % | WEIGHT: 270 LBS | SYSTOLIC BLOOD PRESSURE: 135 MMHG | HEART RATE: 96 BPM | TEMPERATURE: 97 F | BODY MASS INDEX: 46.1 KG/M2

## 2019-03-20 DIAGNOSIS — M47.817 LUMBOSACRAL SPONDYLOSIS WITHOUT MYELOPATHY: Primary | ICD-10-CM

## 2019-03-20 PROCEDURE — 99213 OFFICE O/P EST LOW 20 MIN: CPT

## 2019-03-20 ASSESSMENT — ENCOUNTER SYMPTOMS
CONSTIPATION: 0
BACK PAIN: 1
EYE REDNESS: 0
WHEEZING: 0
EYE PAIN: 0
SHORTNESS OF BREATH: 0
SORE THROAT: 0
NAUSEA: 0

## 2019-03-20 ASSESSMENT — PAIN - FUNCTIONAL ASSESSMENT: PAIN_FUNCTIONAL_ASSESSMENT: PREVENTS OR INTERFERES SOME ACTIVE ACTIVITIES AND ADLS

## 2019-03-20 ASSESSMENT — PAIN DESCRIPTION - ORIENTATION: ORIENTATION: LOWER;MID

## 2019-03-20 ASSESSMENT — PAIN DESCRIPTION - DESCRIPTORS: DESCRIPTORS: ACHING;CONSTANT;PINS AND NEEDLES

## 2019-03-20 ASSESSMENT — PAIN DESCRIPTION - ONSET: ONSET: ON-GOING

## 2019-03-20 ASSESSMENT — PAIN SCALES - GENERAL: PAINLEVEL_OUTOF10: 7

## 2019-03-20 ASSESSMENT — PAIN DESCRIPTION - PAIN TYPE: TYPE: CHRONIC PAIN

## 2019-03-20 ASSESSMENT — PAIN DESCRIPTION - LOCATION: LOCATION: BACK

## 2019-03-20 ASSESSMENT — PAIN DESCRIPTION - FREQUENCY: FREQUENCY: CONTINUOUS

## 2019-03-20 ASSESSMENT — ACTIVITIES OF DAILY LIVING (ADL): EFFECT OF PAIN ON DAILY ACTIVITIES: LIMITS ACTIVITY

## 2019-03-20 ASSESSMENT — PAIN DESCRIPTION - PROGRESSION: CLINICAL_PROGRESSION: NOT CHANGED

## 2019-04-02 ENCOUNTER — OFFICE VISIT (OUTPATIENT)
Dept: PRIMARY CARE CLINIC | Age: 39
End: 2019-04-02
Payer: MEDICAID

## 2019-04-02 VITALS
TEMPERATURE: 98 F | WEIGHT: 273 LBS | DIASTOLIC BLOOD PRESSURE: 64 MMHG | OXYGEN SATURATION: 98 % | BODY MASS INDEX: 46.61 KG/M2 | SYSTOLIC BLOOD PRESSURE: 128 MMHG | HEIGHT: 64 IN | HEART RATE: 79 BPM

## 2019-04-02 DIAGNOSIS — F32.A ANXIETY AND DEPRESSION: ICD-10-CM

## 2019-04-02 DIAGNOSIS — Z23 NEED FOR PROPHYLACTIC VACCINATION AGAINST DIPHTHERIA-TETANUS-PERTUSSIS (DTP): ICD-10-CM

## 2019-04-02 DIAGNOSIS — R07.9 CHEST PAIN, UNSPECIFIED TYPE: ICD-10-CM

## 2019-04-02 DIAGNOSIS — F41.9 ANXIETY AND DEPRESSION: ICD-10-CM

## 2019-04-02 DIAGNOSIS — Z79.899 DRUG THERAPY: ICD-10-CM

## 2019-04-02 DIAGNOSIS — F51.01 PRIMARY INSOMNIA: Primary | ICD-10-CM

## 2019-04-02 LAB
AMPHETAMINE SCREEN, URINE: NORMAL
BARBITURATE SCREEN, URINE: NORMAL
BENZODIAZEPINE SCREEN, URINE: NORMAL
BUPRENORPHINE URINE: NORMAL
COCAINE METABOLITE SCREEN URINE: NORMAL
GABAPENTIN SCREEN, URINE: NORMAL
MDMA URINE: NORMAL
METHADONE SCREEN, URINE: NORMAL
METHAMPHETAMINE, URINE: NORMAL
OPIATE SCREEN URINE: NORMAL
OXYCODONE SCREEN URINE: NORMAL
PHENCYCLIDINE SCREEN URINE: NORMAL
PROPOXYPHENE SCREEN, URINE: NORMAL
THC SCREEN, URINE: NORMAL
TRICYCLIC ANTIDEPRESSANTS, UR: NORMAL

## 2019-04-02 PROCEDURE — 80305 DRUG TEST PRSMV DIR OPT OBS: CPT | Performed by: NURSE PRACTITIONER

## 2019-04-02 PROCEDURE — 99214 OFFICE O/P EST MOD 30 MIN: CPT | Performed by: NURSE PRACTITIONER

## 2019-04-02 PROCEDURE — 90471 IMMUNIZATION ADMIN: CPT | Performed by: NURSE PRACTITIONER

## 2019-04-02 PROCEDURE — 90715 TDAP VACCINE 7 YRS/> IM: CPT | Performed by: NURSE PRACTITIONER

## 2019-04-02 RX ORDER — ZOLPIDEM TARTRATE 5 MG/1
5 TABLET ORAL NIGHTLY PRN
Qty: 30 TABLET | Refills: 1 | Status: SHIPPED | OUTPATIENT
Start: 2019-04-02 | End: 2019-05-02

## 2019-04-02 ASSESSMENT — ENCOUNTER SYMPTOMS
RESPIRATORY NEGATIVE: 1
BACK PAIN: 1
GASTROINTESTINAL NEGATIVE: 1
EYES NEGATIVE: 1

## 2019-04-02 NOTE — PROGRESS NOTES
After obtaining consent, and per orders of SID Jefferson, injection of tap was given in the left deltoi by [facility] LPN Pt tolerated well and had no reactions. Pt was released for office.

## 2019-04-02 NOTE — PROGRESS NOTES
[facility]  [address]  [address] [facility] [address]  Dept: [phone]  Dept Fax: [phone]  Loc: [phone]    Kishore Ni is a 43 y.o. female who presents today for her medical conditions/complaints as noted below. Kishore Ni is c/o of Medication Refill      Chief Complaint   Patient presents with    Medication Refill       HPI:     HPI   Patient here for follow up on anxiety and insomnia. She had heart cath and was negative. She has follow up tomorrow with cardiology in AM.   She has been seeing pain management and are going to discuss starting injections for her low back. She reports that the Ambien has been helping with insomnia. Past Medical History:   Diagnosis Date    Asthma     Bipolar 1 disorder ([address]     Degenerative disc disease, lumbar     Depression     Foot fracture, right     Hypertension     Indigestion     Lumbar radiculopathy     Migraines     Neuropathy         Past Surgical History:   Procedure Laterality Date     SECTION      ELBOW SURGERY      right    RECTAL SURGERY      x2       Social History     Tobacco Use    Smoking status: Former Smoker     Packs/day: 1.00     Years: 10.00     Pack years: 10.00     Types: Cigarettes     Last attempt to quit: 2017     Years since quittin.3    Smokeless tobacco: Never Used   Substance Use Topics    Alcohol use: No        Current Outpatient Medications   Medication Sig Dispense Refill    zolpidem (AMBIEN) 5 MG tablet Take 1 tablet by mouth nightly as needed for Sleep for up to 30 days.  30 tablet 1    verapamil (CALAN SR) 180 MG extended release tablet TAKE 1 TABLET BY MOUTH EVERY DAY AT NIGHT 30 tablet 0    atorvastatin (LIPITOR) 20 MG tablet Take 1 tablet by mouth daily 30 tablet 5    tiZANidine (ZANAFLEX) 4 MG tablet Take 1 tablet by mouth 3 times daily as needed (muscle spasms) 90 tablet 0    diphenoxylate-atropine (LOMOTIL) 2.5-0.025 MG per tablet Take 1 tablet by mouth 4 times daily as needed for Diarrhea for up to 30 days. 120 tablet 1    lurasidone (LATUDA) 40 MG TABS tablet Take 1 tablet by mouth daily (Patient taking differently: Take 40 mg by mouth daily Taking 20 mg right now, will go to 40 mg in about 3 weeks) 30 tablet 2    ibuprofen (ADVIL;MOTRIN) 800 MG tablet Take 1 tablet by mouth every 6 hours as needed for Pain 60 tablet 1    DULoxetine (CYMBALTA) 30 MG extended release capsule Take 1 capsule by mouth 2 times daily 60 capsule 3    nortriptyline (PAMELOR) 75 MG capsule Take 1 capsule by mouth nightly 90 capsule 1    albuterol sulfate HFA (VENTOLIN HFA) 108 (90 Base) MCG/ACT inhaler Inhale 2 puffs into the lungs every 6 hours as needed for Wheezing 1 Inhaler 2    SUMAtriptan (IMITREX) 100 MG tablet Take 1 tablet by mouth once as needed for Migraine 9 tablet 2     No current facility-administered medications for this visit. Allergies   Allergen Reactions    Pcn [Penicillins]     Sulfa Antibiotics        No family history on file. Subjective:      Review of Systems   Constitutional: Negative. HENT: Negative. Eyes: Negative. Respiratory: Negative. Cardiovascular: Negative. Gastrointestinal: Negative. Endocrine: Negative. Genitourinary: Negative. Musculoskeletal: Positive for back pain (chronic). Skin: Negative. Neurological: Negative. Hematological: Negative. Psychiatric/Behavioral: Positive for sleep disturbance (improvement). Objective:     Physical Exam   Constitutional: She is oriented to person, place, and time. Vital signs are normal. She appears well-developed and well-nourished. HENT:   Head: Normocephalic and atraumatic.    Right Ear: Hearing, tympanic membrane, external ear and ear canal normal.   Left Ear: Hearing, tympanic membrane, external ear and ear canal normal.   Nose: Nose normal.   Mouth/Throat: Uvula is midline, oropharynx is clear and moist and mucous membranes are normal.   Eyes: Pupils are equal, round, and reactive to light. Conjunctivae, EOM and lids are normal.   Neck: Trachea normal and normal range of motion. Neck supple. No thyroid mass and no thyromegaly present. Cardiovascular: Normal rate, regular rhythm, normal heart sounds and intact distal pulses. Pulmonary/Chest: Effort normal and breath sounds normal.   Abdominal: Soft. Normal appearance and bowel sounds are normal.   Musculoskeletal:        Cervical back: She exhibits decreased range of motion. She exhibits no tenderness. Thoracic back: Normal. She exhibits normal range of motion and no tenderness. Lumbar back: She exhibits decreased range of motion. She exhibits no tenderness. Neurological: She is alert and oriented to person, place, and time. She has normal strength. Skin: Skin is warm, dry and intact. Psychiatric: She has a normal mood and affect. Her speech is normal and behavior is normal. Judgment and thought content normal. Cognition and memory are normal.   Nursing note and vitals reviewed. /64   Pulse 79   Temp 98 °F (36.7 °C) (Temporal)   Ht 5' 4\" (1.626 m)   Wt 273 lb (123.8 kg)   SpO2 98%   BMI 46.86 kg/m²     Assessment:      Diagnosis Orders   1. Primary insomnia  zolpidem (AMBIEN) 5 MG tablet   2. Need for prophylactic vaccination against diphtheria-tetanus-pertussis (DTP)     3. Drug therapy  POCT Rapid Drug Screen   4. Chest pain, unspecified type     5.  Anxiety and depression         Results for orders placed or performed in visit on 04/02/19   POCT Rapid Drug Screen   Result Value Ref Range    Amphetamine Screen, Urine neg     Barbiturate Screen, Urine neg     Benzodiazepine Screen, Urine neg     Buprenorphine Urine neg     Cocaine Metabolite Screen, Urine neg     Gabapentin Screen, Urine neg     MDMA, Urine neg     Methamphetamine, Urine neg     Methadone Screen, Urine neg     Opiate Scrn, Ur neg     Oxycodone Screen, Ur neg     PCP Screen, Urine neg     Propoxyphene Screen, Urine neg     THC Screen, Urine neg     Tricyclic Antidepressants, Urine neg        Plan:     Patient to keep appointment with cardiology and pain management as scheduled. We will continue current regimen including Ambien since it has improved sleep. Return in about 1 month (around 4/30/2019) for annual physical with PAP. Orders Placed This Encounter   Procedures    Tdap (age 6y and older) IM (BOOSTRIX)    POCT Rapid Drug Screen       Orders Placed This Encounter   Medications    zolpidem (AMBIEN) 5 MG tablet     Sig: Take 1 tablet by mouth nightly as needed for Sleep for up to 30 days. Dispense:  30 tablet     Refill:  1    DISCONTD: Tetanus-Diphth-Acell Pertussis (BOOSTRIX) injection 0.5 mL        Patient offered educational handouts and has had all questions answered. Patient voices understanding and agrees to plans along with risks and benefits of plan. Patient is instructed to continue prior meds, diet, and exercise plans as instructed. Patient agrees to follow up as instructed and sooner if needed. Patient agrees to go to ER if condition becomes emergent. EMR Dragon/transcription disclaimer: Some of this encounter note is an electronic transcription/translation of spoken language to printed text. The electronic translation of spoken language may permit erroneous, or at times, nonsensical words or phrases to be inadvertently transcribed.  Although I have reviewed the note for such errors, some may still exist.    Electronically signed by SID Allen on 4/2/2019 at 12:45 PM

## 2019-04-03 ENCOUNTER — OFFICE VISIT (OUTPATIENT)
Dept: CARDIOLOGY | Age: 39
End: 2019-04-03
Payer: MEDICAID

## 2019-04-03 VITALS
WEIGHT: 277 LBS | HEIGHT: 64 IN | DIASTOLIC BLOOD PRESSURE: 78 MMHG | BODY MASS INDEX: 47.29 KG/M2 | HEART RATE: 87 BPM | SYSTOLIC BLOOD PRESSURE: 122 MMHG

## 2019-04-03 DIAGNOSIS — I25.10 CORONARY ARTERY DISEASE INVOLVING NATIVE CORONARY ARTERY OF NATIVE HEART WITHOUT ANGINA PECTORIS: ICD-10-CM

## 2019-04-03 DIAGNOSIS — E78.5 HYPERLIPIDEMIA, UNSPECIFIED HYPERLIPIDEMIA TYPE: ICD-10-CM

## 2019-04-03 DIAGNOSIS — I10 ESSENTIAL HYPERTENSION: Primary | ICD-10-CM

## 2019-04-03 PROCEDURE — 99213 OFFICE O/P EST LOW 20 MIN: CPT | Performed by: NURSE PRACTITIONER

## 2019-04-04 ENCOUNTER — OFFICE VISIT (OUTPATIENT)
Dept: PSYCHIATRY | Age: 39
End: 2019-04-04
Payer: MEDICAID

## 2019-04-04 VITALS
SYSTOLIC BLOOD PRESSURE: 119 MMHG | OXYGEN SATURATION: 95 % | HEART RATE: 102 BPM | DIASTOLIC BLOOD PRESSURE: 83 MMHG | BODY MASS INDEX: 47.29 KG/M2 | HEIGHT: 64 IN | WEIGHT: 277 LBS

## 2019-04-04 DIAGNOSIS — F31.81 BIPOLAR II DISORDER (HCC): Primary | ICD-10-CM

## 2019-04-04 PROCEDURE — 99214 OFFICE O/P EST MOD 30 MIN: CPT | Performed by: NURSE PRACTITIONER

## 2019-04-04 SDOH — SOCIAL STABILITY: SOCIAL NETWORK: HOW OFTEN DO YOU ATTENT MEETINGS OF THE CLUB OR ORGANIZATION YOU BELONG TO?: NEVER

## 2019-04-04 SDOH — SOCIAL STABILITY: SOCIAL INSECURITY
WITHIN THE LAST YEAR, HAVE TO BEEN RAPED OR FORCED TO HAVE ANY KIND OF SEXUAL ACTIVITY BY YOUR PARTNER OR EX-PARTNER?: NO

## 2019-04-04 SDOH — HEALTH STABILITY: PHYSICAL HEALTH: ON AVERAGE, HOW MANY MINUTES DO YOU ENGAGE IN EXERCISE AT THIS LEVEL?: 0 MIN

## 2019-04-04 SDOH — SOCIAL STABILITY: SOCIAL NETWORK
IN A TYPICAL WEEK, HOW MANY TIMES DO YOU TALK ON THE PHONE WITH FAMILY, FRIENDS, OR NEIGHBORS?: MORE THAN THREE TIMES A WEEK

## 2019-04-04 SDOH — SOCIAL STABILITY: SOCIAL NETWORK: ARE YOU MARRIED, WIDOWED, DIVORCED, SEPARATED, NEVER MARRIED, OR LIVING WITH A PARTNER?: DIVORCED

## 2019-04-04 SDOH — HEALTH STABILITY: PHYSICAL HEALTH: ON AVERAGE, HOW MANY DAYS PER WEEK DO YOU ENGAGE IN MODERATE TO STRENUOUS EXERCISE (LIKE A BRISK WALK)?: 0 DAYS

## 2019-04-04 SDOH — SOCIAL STABILITY: SOCIAL INSECURITY: WITHIN THE LAST YEAR, HAVE YOU BEEN HUMILIATED OR EMOTIONALLY ABUSED IN OTHER WAYS BY YOUR PARTNER OR EX-PARTNER?: NO

## 2019-04-04 SDOH — SOCIAL STABILITY: SOCIAL INSECURITY: WITHIN THE LAST YEAR, HAVE YOU BEEN AFRAID OF YOUR PARTNER OR EX-PARTNER?: NO

## 2019-04-04 SDOH — SOCIAL STABILITY: SOCIAL NETWORK: HOW OFTEN DO YOU ATTEND CHURCH OR RELIGIOUS SERVICES?: NEVER

## 2019-04-04 SDOH — HEALTH STABILITY: MENTAL HEALTH
STRESS IS WHEN SOMEONE FEELS TENSE, NERVOUS, ANXIOUS, OR CAN'T SLEEP AT NIGHT BECAUSE THEIR MIND IS TROUBLED. HOW STRESSED ARE YOU?: VERY MUCH

## 2019-04-04 SDOH — SOCIAL STABILITY: SOCIAL INSECURITY
WITHIN THE LAST YEAR, HAVE YOU BEEN KICKED, HIT, SLAPPED, OR OTHERWISE PHYSICALLY HURT BY YOUR PARTNER OR EX-PARTNER?: NO

## 2019-04-04 SDOH — SOCIAL STABILITY: SOCIAL NETWORK
DO YOU BELONG TO ANY CLUBS OR ORGANIZATIONS SUCH AS CHURCH GROUPS UNIONS, FRATERNAL OR ATHLETIC GROUPS, OR SCHOOL GROUPS?: NO

## 2019-04-04 SDOH — SOCIAL STABILITY: SOCIAL NETWORK: HOW OFTEN DO YOU GET TOGETHER WITH FRIENDS OR RELATIVES?: NEVER
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
## 2019-04-10 RX ORDER — TIZANIDINE 4 MG/1
4 TABLET ORAL 3 TIMES DAILY PRN
Qty: 90 TABLET | Refills: 1 | Status: SHIPPED | OUTPATIENT
Start: 2019-04-10 | End: 2019-06-03 | Stop reason: SDUPTHER

## 2019-04-24 ENCOUNTER — HOSPITAL ENCOUNTER (OUTPATIENT)
Dept: GENERAL RADIOLOGY | Age: 39
Discharge: HOME | End: 2019-04-24
Payer: MEDICAID

## 2019-04-24 DIAGNOSIS — M47.817 LUMBOSACRAL SPONDYLOSIS WITHOUT MYELOPATHY: ICD-10-CM

## 2019-04-24 PROCEDURE — 72120 X-RAY BEND ONLY L-S SPINE: CPT

## 2019-05-01 DIAGNOSIS — R19.7 DIARRHEA, UNSPECIFIED TYPE: ICD-10-CM

## 2019-05-01 RX ORDER — DIPHENOXYLATE HYDROCHLORIDE AND ATROPINE SULFATE 2.5; .025 MG/1; MG/1
1 TABLET ORAL 4 TIMES DAILY PRN
Qty: 120 TABLET | Refills: 1 | Status: SHIPPED | OUTPATIENT
Start: 2019-05-01 | End: 2019-06-24 | Stop reason: SDUPTHER

## 2019-05-01 NOTE — TELEPHONE ENCOUNTER
Elizabeth called requesting a refill of the below medication which has been pended for you:     Requested Prescriptions     Pending Prescriptions Disp Refills    diphenoxylate-atropine (LOMOTIL) 2.5-0.025 MG per tablet 120 tablet 1     Sig: Take 1 tablet by mouth 4 times daily as needed for Diarrhea for up to 30 days.        Last Appointment Date: 4/2/2019  Next Appointment Date: 5/7/2019    Allergies   Allergen Reactions    Pcn [Penicillins]     Sulfa Antibiotics

## 2019-05-07 ENCOUNTER — OFFICE VISIT (OUTPATIENT)
Dept: PRIMARY CARE CLINIC | Age: 39
End: 2019-05-07
Payer: MEDICAID

## 2019-05-07 VITALS
DIASTOLIC BLOOD PRESSURE: 72 MMHG | HEIGHT: 64 IN | WEIGHT: 275.8 LBS | OXYGEN SATURATION: 99 % | SYSTOLIC BLOOD PRESSURE: 120 MMHG | TEMPERATURE: 97.6 F | BODY MASS INDEX: 47.08 KG/M2 | RESPIRATION RATE: 18 BRPM | HEART RATE: 75 BPM

## 2019-05-07 DIAGNOSIS — Z12.31 ENCOUNTER FOR SCREENING MAMMOGRAM FOR MALIGNANT NEOPLASM OF BREAST: ICD-10-CM

## 2019-05-07 DIAGNOSIS — Z01.419 WELL WOMAN EXAM WITH ROUTINE GYNECOLOGICAL EXAM: Primary | ICD-10-CM

## 2019-05-07 PROCEDURE — 99396 PREV VISIT EST AGE 40-64: CPT | Performed by: NURSE PRACTITIONER

## 2019-05-07 ASSESSMENT — ENCOUNTER SYMPTOMS
EYES NEGATIVE: 1
RESPIRATORY NEGATIVE: 1
GASTROINTESTINAL NEGATIVE: 1

## 2019-05-07 NOTE — PROGRESS NOTES
[facility]  [address] [facility]  [address] [place] [address]  Dept: [phone]  Dept Fax: [phone]  Loc: [phone]    Mario Patrick is a 35 y.o. female who presents today for her medical conditions/complaints as noted below. Mario Patrick is c/o of Annual Exam (PAP)      Chief Complaint   Patient presents with    Annual Exam     PAP       HPI:     HPI   Patient here for annual physical exam.  She reports having an abnormal PAP in the past, but biopsy was normal.  Patient reports that grandmother had a hx of breast cancer. She denies any issues today or vaginal discharge. Past Medical History:   Diagnosis Date    Asthma     Bipolar 1 disorder ([address]     Degenerative disc disease, lumbar     Depression     Foot fracture, right     Hypertension     Indigestion     Lumbar radiculopathy     Migraines     Neuropathy         Past Surgical History:   Procedure Laterality Date     SECTION      ELBOW SURGERY      right    RECTAL SURGERY      x2       Social History     Tobacco Use    Smoking status: Former Smoker     Packs/day: 1.00     Years: 10.00     Pack years: 10.00     Types: Cigarettes     Last attempt to quit: 2017     Years since quittin.4    Smokeless tobacco: Never Used   Substance Use Topics    Alcohol use: Yes     Alcohol/week: 1.2 oz     Types: 2 Cans of beer per week     Comment: last drink was 8-9 months ago. No prior blackouts        Current Outpatient Medications   Medication Sig Dispense Refill    diphenoxylate-atropine (LOMOTIL) 2.5-0.025 MG per tablet Take 1 tablet by mouth 4 times daily as needed for Diarrhea for up to 30 days.  120 tablet 1    verapamil (CALAN SR) 180 MG extended release tablet TAKE 1 TABLET BY MOUTH EVERY DAY AT NIGHT 30 tablet 0    tiZANidine (ZANAFLEX) 4 MG tablet Take 1 tablet by mouth 3 times daily as needed (muscle spasms) 90 tablet 1    atorvastatin (LIPITOR) 20 MG tablet Take 1 tablet by mouth daily 30 tablet 5    ibuprofen (ADVIL;MOTRIN) 800 MG tablet Take 1 tablet by mouth every 6 hours as needed for Pain 60 tablet 1    DULoxetine (CYMBALTA) 30 MG extended release capsule Take 1 capsule by mouth 2 times daily 60 capsule 3    nortriptyline (PAMELOR) 75 MG capsule Take 1 capsule by mouth nightly 90 capsule 1    albuterol sulfate HFA (VENTOLIN HFA) 108 (90 Base) MCG/ACT inhaler Inhale 2 puffs into the lungs every 6 hours as needed for Wheezing 1 Inhaler 2    lurasidone (LATUDA) 40 MG TABS tablet Take 1 tablet by mouth daily 30 tablet 2    SUMAtriptan (IMITREX) 100 MG tablet Take 1 tablet by mouth once as needed for Migraine 9 tablet 2     No current facility-administered medications for this visit. Allergies   Allergen Reactions    Pcn [Penicillins]     Sulfa Antibiotics        Family History   Problem Relation Age of Onset    Hypertension Mother    Paula Kendrick Arthritis Mother     Heart Attack Father     No Known Problems Sister     Mental Illness Brother     Kidney Disease Brother         recent kidney replacement    Heart Attack Maternal Grandmother     Heart Attack Maternal Grandfather     Mental Illness Paternal Grandmother         schizophrenia or bipolar    Mental Illness Paternal Grandfather         schizophrenia or bipolar    Other Daughter         does not live with Elizabeth, Live with her father. \"I don't see her. \"    Other Son         Hydrocephalsis         Subjective:      Review of Systems   Constitutional: Negative. HENT: Negative. Eyes: Negative. Respiratory: Negative. Cardiovascular: Negative. Gastrointestinal: Negative. Endocrine: Negative. Genitourinary: Negative. Musculoskeletal: Negative. Skin: Negative. Neurological: Negative. Hematological: Negative. Psychiatric/Behavioral: Negative. Objective:     Physical Exam   Constitutional: She is oriented to person, place, and time.  Vital signs are normal. She appears well-developed and well-nourished. HENT:   Head: Normocephalic and atraumatic. Right Ear: Hearing, tympanic membrane, external ear and ear canal normal.   Left Ear: Hearing, tympanic membrane, external ear and ear canal normal.   Nose: Nose normal.   Mouth/Throat: Uvula is midline, oropharynx is clear and moist and mucous membranes are normal.   Eyes: Pupils are equal, round, and reactive to light. Conjunctivae, EOM and lids are normal.   Neck: Trachea normal and normal range of motion. Neck supple. No thyroid mass and no thyromegaly present. Cardiovascular: Normal rate, regular rhythm, normal heart sounds and intact distal pulses. Pulmonary/Chest: Effort normal and breath sounds normal. Right breast exhibits no inverted nipple, no mass and no tenderness. Left breast exhibits no inverted nipple, no mass and no tenderness. Abdominal: Soft. Normal appearance and bowel sounds are normal.   Genitourinary: Rectum normal and uterus normal.   There is a foreign body (thin black stick like object noted that crumbled when touched. patient denied any issues or any foreign objects ) in the vagina. Vaginal discharge found. Musculoskeletal: Normal range of motion. Neurological: She is alert and oriented to person, place, and time. She has normal strength. Skin: Skin is warm, dry and intact. Psychiatric: She has a normal mood and affect. Her speech is normal and behavior is normal. Judgment and thought content normal. Cognition and memory are normal.   Nursing note and vitals reviewed. /72   Pulse 75   Temp 97.6 °F (36.4 °C) (Temporal)   Resp 18   Ht 5' 4\" (1.626 m)   Wt 275 lb 12.8 oz (125.1 kg)   SpO2 99%   BMI 47.34 kg/m²     Assessment:      Diagnosis Orders   1. Well woman exam with routine gynecological exam     2. Encounter for screening mammogram for malignant neoplasm of breast  KEVEN DIGITAL SCREEN W OR WO CAD BILATERAL       No results found for this visit on 05/07/19.     Plan:     Unable to obtain PAP.  Will refer or OB/GYN for PAP. Order for mammogram given and scheduled. Return in about 3 months (around 8/7/2019) for Follow up chronic conditions. Orders Placed This Encounter   Procedures    KEVEN DIGITAL SCREEN W OR WO CAD BILATERAL     Standing Status:   Future     Standing Expiration Date:   7/7/2020       No orders of the defined types were placed in this encounter. Patient offered educational handouts and has had all questions answered. Patient voices understanding and agrees to plans along with risks and benefits of plan. Patient is instructed to continue prior meds, diet, and exercise plans as instructed. Patient agrees to follow up as instructed and sooner if needed. Patient agrees to go to ER if condition becomes emergent. EMR Dragon/transcription disclaimer: Some of this encounter note is an electronic transcription/translation of spoken language to printed text. The electronic translation of spoken language may permit erroneous, or at times, nonsensical words or phrases to be inadvertently transcribed.  Although I have reviewed the note for such errors, some may still exist.    Electronically signed by SID Navarro on 5/7/2019 at 11:22 AM

## 2019-05-14 ENCOUNTER — HOSPITAL ENCOUNTER (EMERGENCY)
Age: 39
Discharge: HOME | End: 2019-05-14
Attending: EMERGENCY MEDICINE
Payer: MEDICAID

## 2019-05-14 ENCOUNTER — APPOINTMENT (OUTPATIENT)
Dept: CT IMAGING | Age: 39
End: 2019-05-14
Payer: MEDICAID

## 2019-05-14 ENCOUNTER — NURSE TRIAGE (OUTPATIENT)
Dept: OTHER | Facility: CLINIC | Age: 39
End: 2019-05-14

## 2019-05-14 VITALS
RESPIRATION RATE: 20 BRPM | WEIGHT: 270 LBS | TEMPERATURE: 98.1 F | SYSTOLIC BLOOD PRESSURE: 98 MMHG | BODY MASS INDEX: 46.35 KG/M2 | OXYGEN SATURATION: 96 % | HEART RATE: 78 BPM | DIASTOLIC BLOOD PRESSURE: 75 MMHG

## 2019-05-14 DIAGNOSIS — R42 VERTIGO: Primary | ICD-10-CM

## 2019-05-14 DIAGNOSIS — R93.0 ABNORMAL HEAD CT: ICD-10-CM

## 2019-05-14 LAB
ALBUMIN SERPL-MCNC: 4 G/DL (ref 3.5–5.2)
ALP BLD-CCNC: 101 U/L (ref 35–104)
ALT SERPL-CCNC: 33 U/L (ref 5–33)
ANION GAP SERPL CALCULATED.3IONS-SCNC: 12 MMOL/L (ref 7–19)
AST SERPL-CCNC: 37 U/L (ref 5–32)
BASOPHILS ABSOLUTE: 0.1 K/UL (ref 0–0.2)
BASOPHILS RELATIVE PERCENT: 0.5 % (ref 0–1)
BILIRUB SERPL-MCNC: 0.3 MG/DL (ref 0.2–1.2)
BILIRUBIN URINE: NEGATIVE
BLOOD, URINE: NEGATIVE
BUN BLDV-MCNC: 13 MG/DL (ref 6–20)
CALCIUM SERPL-MCNC: 8.9 MG/DL (ref 8.6–10)
CHLORIDE BLD-SCNC: 101 MMOL/L (ref 98–111)
CLARITY: ABNORMAL
CO2: 23 MMOL/L (ref 22–29)
COLOR: YELLOW
CREAT SERPL-MCNC: 1.2 MG/DL (ref 0.5–0.9)
EOSINOPHILS ABSOLUTE: 0.1 K/UL (ref 0–0.6)
EOSINOPHILS RELATIVE PERCENT: 1 % (ref 0–5)
GFR NON-AFRICAN AMERICAN: 50
GLUCOSE BLD-MCNC: 140 MG/DL (ref 74–109)
GLUCOSE URINE: NEGATIVE MG/DL
HCG QUALITATIVE: NEGATIVE
HCT VFR BLD CALC: 46.8 % (ref 37–47)
HEMOGLOBIN: 14.9 G/DL (ref 12–16)
KETONES, URINE: NEGATIVE MG/DL
LEUKOCYTE ESTERASE, URINE: NEGATIVE
LYMPHOCYTES ABSOLUTE: 3.8 K/UL (ref 1.1–4.5)
LYMPHOCYTES RELATIVE PERCENT: 37.3 % (ref 20–40)
MCH RBC QN AUTO: 29 PG (ref 27–31)
MCHC RBC AUTO-ENTMCNC: 31.8 G/DL (ref 33–37)
MCV RBC AUTO: 91.1 FL (ref 81–99)
MONOCYTES ABSOLUTE: 0.5 K/UL (ref 0–0.9)
MONOCYTES RELATIVE PERCENT: 5 % (ref 0–10)
NEUTROPHILS ABSOLUTE: 5.7 K/UL (ref 1.5–7.5)
NEUTROPHILS RELATIVE PERCENT: 55.5 % (ref 50–65)
NITRITE, URINE: NEGATIVE
PDW BLD-RTO: 11.9 % (ref 11.5–14.5)
PH UA: 6.5 (ref 5–8)
PLATELET # BLD: 342 K/UL (ref 130–400)
PMV BLD AUTO: 10 FL (ref 9.4–12.3)
POTASSIUM SERPL-SCNC: 4.4 MMOL/L (ref 3.5–5)
PROTEIN UA: NEGATIVE MG/DL
RBC # BLD: 5.14 M/UL (ref 4.2–5.4)
SODIUM BLD-SCNC: 136 MMOL/L (ref 136–145)
SPECIFIC GRAVITY UA: 1.02 (ref 1–1.03)
TOTAL PROTEIN: 7.6 G/DL (ref 6.6–8.7)
TROPONIN: <0.01 NG/ML (ref 0–0.03)
URINE REFLEX TO CULTURE: ABNORMAL
UROBILINOGEN, URINE: 0.2 E.U./DL
WBC # BLD: 10.3 K/UL (ref 4.8–10.8)

## 2019-05-14 PROCEDURE — 99284 EMERGENCY DEPT VISIT MOD MDM: CPT | Performed by: EMERGENCY MEDICINE

## 2019-05-14 PROCEDURE — 93005 ELECTROCARDIOGRAM TRACING: CPT

## 2019-05-14 PROCEDURE — 99284 EMERGENCY DEPT VISIT MOD MDM: CPT

## 2019-05-14 PROCEDURE — 36415 COLL VENOUS BLD VENIPUNCTURE: CPT

## 2019-05-14 PROCEDURE — 6370000000 HC RX 637 (ALT 250 FOR IP): Performed by: EMERGENCY MEDICINE

## 2019-05-14 PROCEDURE — 70450 CT HEAD/BRAIN W/O DYE: CPT

## 2019-05-14 PROCEDURE — 84484 ASSAY OF TROPONIN QUANT: CPT

## 2019-05-14 PROCEDURE — 2580000003 HC RX 258: Performed by: EMERGENCY MEDICINE

## 2019-05-14 PROCEDURE — 81003 URINALYSIS AUTO W/O SCOPE: CPT

## 2019-05-14 PROCEDURE — 84703 CHORIONIC GONADOTROPIN ASSAY: CPT

## 2019-05-14 PROCEDURE — 80053 COMPREHEN METABOLIC PANEL: CPT

## 2019-05-14 PROCEDURE — 85025 COMPLETE CBC W/AUTO DIFF WBC: CPT

## 2019-05-14 RX ORDER — ZOLPIDEM TARTRATE 5 MG/1
5 TABLET ORAL NIGHTLY PRN
COMMUNITY
End: 2019-06-03 | Stop reason: SDUPTHER

## 2019-05-14 RX ORDER — MECLIZINE HYDROCHLORIDE 25 MG/1
25 TABLET ORAL 3 TIMES DAILY PRN
Qty: 30 TABLET | Refills: 0 | Status: SHIPPED | OUTPATIENT
Start: 2019-05-14 | End: 2019-05-28 | Stop reason: SDUPTHER

## 2019-05-14 RX ORDER — MECLIZINE HCL 12.5 MG/1
12.5 TABLET ORAL ONCE
Status: COMPLETED | OUTPATIENT
Start: 2019-05-14 | End: 2019-05-14

## 2019-05-14 RX ORDER — 0.9 % SODIUM CHLORIDE 0.9 %
1000 INTRAVENOUS SOLUTION INTRAVENOUS ONCE
Status: COMPLETED | OUTPATIENT
Start: 2019-05-14 | End: 2019-05-14

## 2019-05-14 RX ADMIN — SODIUM CHLORIDE 1000 ML: 9 INJECTION, SOLUTION INTRAVENOUS at 20:00

## 2019-05-14 RX ADMIN — MECLIZINE 12.5 MG: 12.5 TABLET ORAL at 19:51

## 2019-05-14 ASSESSMENT — ENCOUNTER SYMPTOMS
DIARRHEA: 0
SORE THROAT: 0
RHINORRHEA: 0
SHORTNESS OF BREATH: 0
VOMITING: 0
ABDOMINAL PAIN: 0
NAUSEA: 0
BACK PAIN: 0

## 2019-05-14 NOTE — TELEPHONE ENCOUNTER
Reason for Disposition   Lightheadedness (dizziness) present now, after 2 hours of rest and fluids    Protocols used: DIZZINESS-ADULT-OH    Pt calls with c/o intermittent dizziness that started a couple of days ago. She does not have the dizziness now, but she reports that at times, it is severe. She states she is drinking adequately. She states when this happens , sometimes she forgets where she is and she has to look around to reorient herself. She also states she stutters a lot. Pt has never had this before. No other symptoms to report. Caller with symptoms as documented above. Caller informed of disposition. Soft transfer to pre-service center to schedule apt as recommended. Care advice as documented.

## 2019-05-15 ENCOUNTER — TELEPHONE (OUTPATIENT)
Dept: PRIMARY CARE CLINIC | Age: 39
End: 2019-05-15
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
## 2019-05-16 LAB
EKG P AXIS: 29 DEGREES
EKG P-R INTERVAL: 146 MS
EKG Q-T INTERVAL: 304 MS
EKG QRS DURATION: 88 MS
EKG QTC CALCULATION (BAZETT): 325 MS
EKG T AXIS: 31 DEGREES

## 2019-05-17 ENCOUNTER — OFFICE VISIT (OUTPATIENT)
Dept: PRIMARY CARE CLINIC | Age: 39
End: 2019-05-17
Payer: MEDICAID

## 2019-05-17 VITALS
TEMPERATURE: 98 F | DIASTOLIC BLOOD PRESSURE: 84 MMHG | BODY MASS INDEX: 47.12 KG/M2 | OXYGEN SATURATION: 98 % | HEIGHT: 64 IN | HEART RATE: 73 BPM | WEIGHT: 276 LBS | SYSTOLIC BLOOD PRESSURE: 136 MMHG

## 2019-05-17 DIAGNOSIS — R42 DIZZINESS: ICD-10-CM

## 2019-05-17 DIAGNOSIS — G93.0 BRAIN CYST: ICD-10-CM

## 2019-05-17 DIAGNOSIS — R90.89 ABNORMAL CT OF BRAIN: Primary | ICD-10-CM

## 2019-05-17 PROCEDURE — 99214 OFFICE O/P EST MOD 30 MIN: CPT | Performed by: NURSE PRACTITIONER

## 2019-05-17 ASSESSMENT — ENCOUNTER SYMPTOMS
RESPIRATORY NEGATIVE: 1
GASTROINTESTINAL NEGATIVE: 1

## 2019-05-17 NOTE — PROGRESS NOTES
[facility]  [address] [facility]  [address] [place] [address]  Dept: [phone]  Dept Fax: [phone]  Loc: [phone]    Kvng Lombardi is a 35 y.o. female who presents today for her medical conditions/complaints as noted below. Kvng Lombardi is c/o of Follow-Up from Hospital (Abnormal CT)      Chief Complaint   Patient presents with    Follow-Up from Hospital     Abnormal CT       HPI:     HPI   Patient is here for a follow-up from an ER visit on May 14. While she was in the ER she was noted to have increased dizziness and had a CT completed. She was diagnosed with vertigo but was noted to have an abnormal CT of the head having a cyst. Patient was recommended to have MRI of the brain due to this. Past Medical History:   Diagnosis Date    Asthma     Bipolar 1 disorder ([address]     Degenerative disc disease, lumbar     Depression     Foot fracture, right     Hypertension     Indigestion     Lumbar radiculopathy     Migraines     Neuropathy         Past Surgical History:   Procedure Laterality Date     SECTION      ELBOW SURGERY      right    RECTAL SURGERY      x2       Social History     Tobacco Use    Smoking status: Former Smoker     Packs/day: 1.00     Years: 10.00     Pack years: 10.00     Types: Cigarettes     Last attempt to quit: 2017     Years since quittin.4    Smokeless tobacco: Never Used   Substance Use Topics    Alcohol use: Yes     Alcohol/week: 1.2 oz     Types: 2 Cans of beer per week     Comment: last drink was 8-9 months ago. No prior blackouts        Current Outpatient Medications   Medication Sig Dispense Refill    zolpidem (AMBIEN) 5 MG tablet Take 5 mg by mouth nightly as needed for Sleep.       meclizine (ANTIVERT) 25 MG tablet Take 1 tablet by mouth 3 times daily as needed for Dizziness or Nausea 30 tablet 0    diphenoxylate-atropine (LOMOTIL) 2.5-0.025 MG per tablet Take 1 tablet by mouth 4 times daily as needed for Diarrhea for up to 30 days. 120 tablet 1    verapamil (CALAN SR) 180 MG extended release tablet TAKE 1 TABLET BY MOUTH EVERY DAY AT NIGHT 30 tablet 0    atorvastatin (LIPITOR) 20 MG tablet Take 1 tablet by mouth daily 30 tablet 5    ibuprofen (ADVIL;MOTRIN) 800 MG tablet Take 1 tablet by mouth every 6 hours as needed for Pain 60 tablet 1    DULoxetine (CYMBALTA) 30 MG extended release capsule Take 1 capsule by mouth 2 times daily 60 capsule 3    nortriptyline (PAMELOR) 75 MG capsule Take 1 capsule by mouth nightly 90 capsule 1    albuterol sulfate HFA (VENTOLIN HFA) 108 (90 Base) MCG/ACT inhaler Inhale 2 puffs into the lungs every 6 hours as needed for Wheezing 1 Inhaler 2    lurasidone (LATUDA) 40 MG TABS tablet Take 1 tablet by mouth daily 30 tablet 2    SUMAtriptan (IMITREX) 100 MG tablet Take 1 tablet by mouth once as needed for Migraine 9 tablet 2     No current facility-administered medications for this visit. Allergies   Allergen Reactions    Pcn [Penicillins]     Sulfa Antibiotics        Family History   Problem Relation Age of Onset    Hypertension Mother    Baljinder Ishihkris Arthritis Mother     Heart Attack Father     No Known Problems Sister     Mental Illness Brother     Kidney Disease Brother         recent kidney replacement    Heart Attack Maternal Grandmother     Heart Attack Maternal Grandfather     Mental Illness Paternal Grandmother         schizophrenia or bipolar    Mental Illness Paternal Grandfather         schizophrenia or bipolar    Other Daughter         does not live with Elizabeth, Live with her father. \"I don't see her. \"    Other Son         Hydrocephalsis               Subjective:      Review of Systems   Constitutional: Negative. HENT: Positive for tinnitus. Eyes: Positive for visual disturbance. Respiratory: Negative. Cardiovascular: Negative. Gastrointestinal: Negative. Endocrine: Negative. Genitourinary: Negative. Musculoskeletal: Negative. Skin: Negative. Neurological: Positive for dizziness. Hematological: Negative. Psychiatric/Behavioral: Negative. Objective:     Physical Exam   Constitutional: She is oriented to person, place, and time. Vital signs are normal. She appears well-developed and well-nourished. obese   HENT:   Head: Normocephalic and atraumatic. Right Ear: Hearing, tympanic membrane, external ear and ear canal normal.   Left Ear: Hearing, tympanic membrane, external ear and ear canal normal.   Nose: Nose normal.   Mouth/Throat: Uvula is midline, oropharynx is clear and moist and mucous membranes are normal.   Eyes: Pupils are equal, round, and reactive to light. Conjunctivae, EOM and lids are normal.   Neck: Trachea normal and normal range of motion. Neck supple. No thyroid mass and no thyromegaly present. Cardiovascular: Normal rate, regular rhythm, normal heart sounds and intact distal pulses. Pulmonary/Chest: Effort normal and breath sounds normal.   Abdominal: Soft. Normal appearance and bowel sounds are normal.   Musculoskeletal: Normal range of motion. Cervical back: Normal. She exhibits normal range of motion and no tenderness. Thoracic back: Normal. She exhibits normal range of motion and no tenderness. Lumbar back: Normal. She exhibits normal range of motion and no tenderness. Neurological: She is alert and oriented to person, place, and time. She has normal strength. Skin: Skin is warm, dry and intact. Psychiatric: She has a normal mood and affect. Her speech is normal and behavior is normal. Judgment and thought content normal. Cognition and memory are normal.   Nursing note and vitals reviewed. /84   Pulse 73   Temp 98 °F (36.7 °C) (Temporal)   Ht 5' 4\" (1.626 m)   Wt 276 lb (125.2 kg)   SpO2 98%   BMI 47.38 kg/m²     Assessment:      Diagnosis Orders   1. Abnormal CT of brain  MRI BRAIN WO CONTRAST   2.  Brain cyst  MRI BRAIN WO CONTRAST   3. Dizziness  MRI BRAIN WO CONTRAST       No results found for this visit on 05/17/19. Plan:     MRI of brain due to symptoms and abnormal CT of Head. Patient to have appointment with neurology. Return for Keep follow up as scheduled. Orders Placed This Encounter   Procedures    MRI BRAIN WO CONTRAST     Standing Status:   Future     Standing Expiration Date:   5/17/2020       No orders of the defined types were placed in this encounter. Patient offered educational handouts and has had all questions answered. Patient voices understanding and agrees to plans along with risks and benefits of plan. Patient is instructed to continue prior meds, diet, and exercise plans as instructed. Patient agrees to follow up as instructed and sooner if needed. Patient agrees to go to ER if condition becomes emergent. EMR Dragon/transcription disclaimer: Some of this encounter note is an electronic transcription/translation of spoken language to printed text. The electronic translation of spoken language may permit erroneous, or at times, nonsensical words or phrases to be inadvertently transcribed.  Although I have reviewed the note for such errors, some may still exist.    Electronically signed by SID Manley on 5/17/2019 at 2:26 PM

## 2019-05-22 ENCOUNTER — OFFICE VISIT (OUTPATIENT)
Dept: NEUROLOGY | Age: 39
End: 2019-05-22
Payer: MEDICAID

## 2019-05-22 VITALS
HEART RATE: 113 BPM | DIASTOLIC BLOOD PRESSURE: 106 MMHG | WEIGHT: 273 LBS | HEIGHT: 64 IN | SYSTOLIC BLOOD PRESSURE: 166 MMHG | BODY MASS INDEX: 46.61 KG/M2

## 2019-05-22 DIAGNOSIS — R94.02 ABNORMAL BRAIN SCAN: ICD-10-CM

## 2019-05-22 DIAGNOSIS — H53.9 VISUAL DISTURBANCE: ICD-10-CM

## 2019-05-22 DIAGNOSIS — R42 VERTIGO: Primary | ICD-10-CM

## 2019-05-22 DIAGNOSIS — H91.91 HEARING LOSS OF RIGHT EAR, UNSPECIFIED HEARING LOSS TYPE: ICD-10-CM

## 2019-05-22 PROCEDURE — 99204 OFFICE O/P NEW MOD 45 MIN: CPT | Performed by: PSYCHIATRY & NEUROLOGY

## 2019-05-22 NOTE — PROGRESS NOTES
Chief Complaint   Patient presents with    New Patient     Referred by ED for cyst on brain        Madelyn Meckel is a 35y.o. year old female who is seen for evaluation of a possible cyst on the brain and dizziness. The patient indicates that over the last month or so she has had intermitted bouts of dizziness and vertigo. She indicates that she that this can occur with sitting or moving. She will get a sensation of spinning that can last minutes or longer. She denies diplopia, dysarthria, dysphagia, weakness or numbness. Over the same time she has had some heading issues with her left ear. She had a CT scan following this which revealed a 7 mm low density area anterior to the tentorium of unclear significance.      Active Ambulatory Problems     Diagnosis Date Noted    Irritable bowel syndrome with diarrhea 2018    Neuropathy 2018    Benign essential HTN 2018    Primary insomnia 2018    Anxiety and depression 2018    Functional dyspepsia 2018    Chronic midline low back pain 2018    DDD (degenerative disc disease), lumbar 2018    Myofascial muscle pain 2018    Lumbosacral spondylosis without myelopathy 10/30/2018    Myofascial pain 10/30/2018    Bilateral sacroiliitis ([address] 10/30/2018    Chest discomfort 2019    Vertigo 2019    Visual disturbance 2019    Abnormal brain scan 2019    Hearing loss of right ear 2019     Resolved Ambulatory Problems     Diagnosis Date Noted    No Resolved Ambulatory Problems     Past Medical History:   Diagnosis Date    Asthma     Bipolar 1 disorder ([address]     Degenerative disc disease, lumbar     Depression     Foot fracture, right     Hypertension     Indigestion     Lumbar radiculopathy     Migraines     Neuropathy        Past Surgical History:   Procedure Laterality Date     SECTION      ELBOW SURGERY      right    RECTAL SURGERY      x2       Family History Problem Relation Age of Onset    Hypertension Mother    [facility] [address] Arthritis Mother     Heart Attack Father     No Known Problems Sister     Mental Illness Brother     Kidney Disease Brother         recent kidney replacement    Heart Attack Maternal Grandmother     Heart Attack Maternal Grandfather     Mental Illness Paternal Grandmother         schizophrenia or bipolar    Mental Illness Paternal Grandfather         schizophrenia or bipolar    Other Daughter         does not live with Elizabeth, Live with her father. \"I don't see her. \"    Other Son         Hydrocephalsis       Allergies   Allergen Reactions    Pcn [Penicillins]     Sulfa Antibiotics        Social History     Socioeconomic History    Marital status:      Spouse name: Not on file    Number of children: Not on file    Years of education: Not on file    Highest education level: Not on file   Occupational History    Not on file   Social Needs    Financial resource strain: Not on file    Food insecurity:     Worry: Not on file     Inability: Not on file    Transportation needs:     Medical: Not on file     Non-medical: Not on file   Tobacco Use    Smoking status: Former Smoker     Packs/day: 1.00     Years: 10.00     Pack years: 10.00     Types: Cigarettes     Last attempt to quit: 2017     Years since quittin.4    Smokeless tobacco: Never Used   Substance and Sexual Activity    Alcohol use: Yes     Alcohol/week: 1.2 oz     Types: 2 Cans of beer per week     Comment: last drink was 8-9 months ago. No prior blackouts    Drug use: No     Types: Marijuana     Comment: Last used on 18.  Has used meth 2012    Sexual activity: Yes     Partners: Male     Birth control/protection: IUD   Lifestyle    Physical activity:     Days per week: 0 days     Minutes per session: 0 min    Stress: Very much   Relationships    Social connections:     Talks on phone: More than three times a week     Gets together: Never     Attends mouth daily 30 tablet 5    ibuprofen (ADVIL;MOTRIN) 800 MG tablet Take 1 tablet by mouth every 6 hours as needed for Pain 60 tablet 1    DULoxetine (CYMBALTA) 30 MG extended release capsule Take 1 capsule by mouth 2 times daily 60 capsule 3    nortriptyline (PAMELOR) 75 MG capsule Take 1 capsule by mouth nightly 90 capsule 1    albuterol sulfate HFA (VENTOLIN HFA) 108 (90 Base) MCG/ACT inhaler Inhale 2 puffs into the lungs every 6 hours as needed for Wheezing 1 Inhaler 2    lurasidone (LATUDA) 40 MG TABS tablet Take 1 tablet by mouth daily 30 tablet 2    SUMAtriptan (IMITREX) 100 MG tablet Take 1 tablet by mouth once as needed for Migraine 9 tablet 2     No current facility-administered medications for this visit. BP (!) 166/106   Pulse 113   Ht 5' 4\" (1.626 m)   Wt 273 lb (123.8 kg)   BMI 46.86 kg/m²     Constitutional - well developed, well nourished. Eyes - conjunctiva normal.  Pupils react to light  Ear, nose, throat -hearing intact to finger rub No scars, masses, or lesions over external nose or ears, no atrophy of tongue  Neck-symmetric, no masses noted, no jugular vein distension  Respiration- chest wall appears symmetric, good expansion,   normal effort without use of accessory muscles  Musculoskeletal - no significant wasting of muscles noted, no bony deformities  Extremities-no clubbing, cyanosis or edema  Skin - warm, dry, and intact. No rash, erythema, or pallor.   Psychiatric - mood, affect, and behavior appear normal.      Neurological exam  Awake, alert, fluent oriented x 3 appropriate affect  Attention and concentration appear appropriate  Recent and remote memory appears unremarkable  Speech normal without dysarthria  No clear issues with language of fund of knowledge    Cranial Nerve Exam   CN II- Visual fields grossly unremarkable  CN III, IV,VI-EOMI, No nystagmus, conjugate eye movements, no ptosis  CN V-sensation intact to LT over face  CN VII-no facial assymetry  CN VIII-Hearing intact to finger rub  CN IX and X- Palate elevates in midline  CN XI-good shoulder shrug  CN XII-Tongue midline with no fasciculations or fibrillations    Motor Exam  V/V throughout upper and lower extremities bilaterally, no cogwheeling, normal tone    Sensory Exam  Sensation intact to light touch and temperature upper and lower extremities bilaterally    Reflexes   2+ biceps bilaterally  2+ brachioradialis  2+patella  2+ ankle jerks  No clonus ankles  No Jones's sign bilateral hands    Tremors- no tremors in hands or head noted    Gait  Normal base and speed  No ataxia    Coordination  Finger to nose-unremarkable    No results found for: [id number]  Lab Results   Component Value Date    WBC 10.3 05/14/2019    HGB 14.9 05/14/2019    HCT 46.8 05/14/2019    MCV 91.1 05/14/2019     05/14/2019     Lab Results   Component Value Date     05/14/2019    K 4.4 05/14/2019     05/14/2019    CO2 23 05/14/2019    BUN 13 05/14/2019    CREATININE 1.2 (H) 05/14/2019    GLUCOSE 140 (H) 05/14/2019    CALCIUM 8.9 05/14/2019    PROT 7.6 05/14/2019    LABALBU 4.0 05/14/2019    BILITOT 0.3 05/14/2019    ALKPHOS 101 05/14/2019    AST 37 (H) 05/14/2019    ALT 33 05/14/2019    LABGLOM 50 (A) 05/14/2019           Assessment    ICD-10-CM    1. Vertigo R42 MRI Brain W WO Contrast     Ambulatory referral to Physical Therapy   2. Visual disturbance H53.9 MRI Brain W WO Contrast   3. Abnormal brain scan R94.02 MRI Brain W WO Contrast   4. Hearing loss of right ear, unspecified hearing loss type H91.91 MRI Brain W WO Contrast       Her neurological examination today was significant for a positive [place] Hallpike maneuver most consistent with a peripheral vestibulopathy ( BPPV). She will be referred to vestibular rehab for this. She will also be scheduled for an MRI of the brain with and without contrast to assess the abnormal findings on CT. The patient indicated understanding of the management plan.  She is to follow up with me on a PRN and call after testing to determine if any further management is warranted. Plan    Orders Placed This Encounter   Procedures    MRI Brain W WO Contrast    Ambulatory referral to Physical Therapy       No orders of the defined types were placed in this encounter. Return if symptoms worsen or fail to improve. EMR Dragon/transcription disclaimer:Significant part of this  encounter note is electronic transcription/translation of spoken language to printed text. The electronic translation of spoken language may be erroneous, or at times, nonsensical words or phrases may be inadvertently transcribed.  Although I have reviewed the note for such errors, some may still exist.

## 2019-05-22 NOTE — PROGRESS NOTES
Review of Systems    Constitutional - No fever or chills. yes diaphoresis or significant fatigue. HENT -  No tinnitus or significant hearing loss. Eyes - yes sudden vision change or eye pain  Respiratory - no significant shortness of breath or cough  Cardiovascular - no chest pain No palpitations or significant leg swelling  Gastrointestinal - no abdominal swelling or pain. Genitourinary - yes difficulty urinating, dysuria  Musculoskeletal - yes back pain or myalgia. Skin - no color change or rash  Neurologic - No seizures. No lateralizing weakness. Hematologic - yes easy bruising or excessive bleeding. Psychiatric - yes severe anxiety or nervousness. All other review of systems are negative.

## 2019-05-28 RX ORDER — MECLIZINE HYDROCHLORIDE 25 MG/1
25 TABLET ORAL 3 TIMES DAILY PRN
Qty: 30 TABLET | Refills: 0 | Status: SHIPPED | OUTPATIENT
Start: 2019-05-28 | End: 2019-06-10 | Stop reason: SDUPTHER

## 2019-06-03 DIAGNOSIS — F32.A ANXIETY AND DEPRESSION: ICD-10-CM

## 2019-06-03 DIAGNOSIS — F41.9 ANXIETY AND DEPRESSION: ICD-10-CM

## 2019-06-03 RX ORDER — DULOXETIN HYDROCHLORIDE 30 MG/1
30 CAPSULE, DELAYED RELEASE ORAL 2 TIMES DAILY
Qty: 60 CAPSULE | Refills: 5 | Status: SHIPPED | OUTPATIENT
Start: 2019-06-03

## 2019-06-03 RX ORDER — NORTRIPTYLINE HYDROCHLORIDE 75 MG/1
75 CAPSULE ORAL NIGHTLY
Qty: 90 CAPSULE | Refills: 1 | Status: SHIPPED | OUTPATIENT
Start: 2019-06-03

## 2019-06-03 RX ORDER — ZOLPIDEM TARTRATE 5 MG/1
5 TABLET ORAL NIGHTLY PRN
Qty: 30 TABLET | Refills: 0 | OUTPATIENT
Start: 2019-06-03 | End: 2019-07-03

## 2019-06-03 NOTE — TELEPHONE ENCOUNTER
Fabiana Later called to request a refill on her medication. Last office visit : 5/17/2019   Next office visit : 6/18/2019     Last UDS:   Amphetamine Screen, Urine   Date Value Ref Range Status   04/02/2019 neg  Final     Barbiturate Screen, Urine   Date Value Ref Range Status   04/02/2019 neg  Final     Benzodiazepine Screen, Urine   Date Value Ref Range Status   04/02/2019 neg  Final     Buprenorphine Urine   Date Value Ref Range Status   04/02/2019 neg  Final     Cocaine Metabolite Screen, Urine   Date Value Ref Range Status   04/02/2019 neg  Final     Gabapentin Screen, Urine   Date Value Ref Range Status   04/02/2019 neg  Final     MDMA, Urine   Date Value Ref Range Status   04/02/2019 neg  Final     Methamphetamine, Urine   Date Value Ref Range Status   04/02/2019 neg  Final     Opiate Scrn, Ur   Date Value Ref Range Status   04/02/2019 neg  Final     Oxycodone Screen, Ur   Date Value Ref Range Status   04/02/2019 neg  Final     PCP Screen, Urine   Date Value Ref Range Status   04/02/2019 neg  Final     Propoxyphene Screen, Urine   Date Value Ref Range Status   04/02/2019 neg  Final     THC Screen, Urine   Date Value Ref Range Status   04/02/2019 neg  Final     Tricyclic Antidepressants, Urine   Date Value Ref Range Status   04/02/2019 neg  Final       Rx called to pharmacy    Requested Prescriptions     Pending Prescriptions Disp Refills    zolpidem (AMBIEN) 5 MG tablet 30 tablet 0     Sig: Take 1 tablet by mouth nightly as needed for Sleep for up to 30 days.  nortriptyline (PAMELOR) 75 MG capsule 90 capsule 1     Sig: Take 1 capsule by mouth nightly    DULoxetine (CYMBALTA) 30 MG extended release capsule 60 capsule 5     Sig: Take 1 capsule by mouth 2 times daily         Please approve or refuse this medication.    Mara Page LPN

## 2019-06-04 ENCOUNTER — HOSPITAL ENCOUNTER (OUTPATIENT)
Dept: MRI IMAGING | Age: 39
Discharge: HOME | End: 2019-06-04
Payer: MEDICAID

## 2019-06-04 DIAGNOSIS — R42 VERTIGO: ICD-10-CM

## 2019-06-04 DIAGNOSIS — R42 DIZZINESS: ICD-10-CM

## 2019-06-04 DIAGNOSIS — G93.0 BRAIN CYST: ICD-10-CM

## 2019-06-04 DIAGNOSIS — R90.89 ABNORMAL CT OF BRAIN: ICD-10-CM

## 2019-06-04 DIAGNOSIS — H53.9 VISUAL DISTURBANCE: ICD-10-CM

## 2019-06-04 DIAGNOSIS — R94.02 ABNORMAL BRAIN SCAN: ICD-10-CM

## 2019-06-04 DIAGNOSIS — H91.91 HEARING LOSS OF RIGHT EAR, UNSPECIFIED HEARING LOSS TYPE: ICD-10-CM

## 2019-06-04 PROCEDURE — A9577 INJ MULTIHANCE: HCPCS | Performed by: PSYCHIATRY & NEUROLOGY

## 2019-06-04 PROCEDURE — 70553 MRI BRAIN STEM W/O & W/DYE: CPT

## 2019-06-04 PROCEDURE — 6360000004 HC RX CONTRAST MEDICATION: Performed by: PSYCHIATRY & NEUROLOGY

## 2019-06-04 RX ORDER — TIZANIDINE 4 MG/1
4 TABLET ORAL 3 TIMES DAILY PRN
Qty: 90 TABLET | Refills: 2 | Status: SHIPPED | OUTPATIENT
Start: 2019-06-04 | End: 2019-07-04

## 2019-06-04 RX ADMIN — GADOBENATE DIMEGLUMINE 20 ML: 529 INJECTION, SOLUTION INTRAVENOUS at 14:52

## 2019-06-06 ENCOUNTER — HOSPITAL ENCOUNTER (OUTPATIENT)
Dept: PAIN MANAGEMENT | Age: 39
Discharge: HOME | End: 2019-06-06
Payer: MEDICAID

## 2019-06-06 VITALS
DIASTOLIC BLOOD PRESSURE: 68 MMHG | WEIGHT: 270.6 LBS | OXYGEN SATURATION: 98 % | RESPIRATION RATE: 18 BRPM | BODY MASS INDEX: 46.2 KG/M2 | TEMPERATURE: 97.2 F | SYSTOLIC BLOOD PRESSURE: 112 MMHG | HEIGHT: 64 IN | HEART RATE: 72 BPM

## 2019-06-06 PROCEDURE — 99214 OFFICE O/P EST MOD 30 MIN: CPT | Performed by: NURSE PRACTITIONER

## 2019-06-06 PROCEDURE — 99213 OFFICE O/P EST LOW 20 MIN: CPT

## 2019-06-06 ASSESSMENT — PAIN DESCRIPTION - ONSET: ONSET: ON-GOING

## 2019-06-06 ASSESSMENT — ENCOUNTER SYMPTOMS
BACK PAIN: 1
EYE REDNESS: 0
CONSTIPATION: 0
EYE PAIN: 0
SORE THROAT: 0
SHORTNESS OF BREATH: 0
WHEEZING: 0

## 2019-06-06 ASSESSMENT — PAIN - FUNCTIONAL ASSESSMENT: PAIN_FUNCTIONAL_ASSESSMENT: PREVENTS OR INTERFERES SOME ACTIVE ACTIVITIES AND ADLS

## 2019-06-06 ASSESSMENT — PAIN DESCRIPTION - ORIENTATION: ORIENTATION: MID;UPPER

## 2019-06-06 ASSESSMENT — PAIN SCALES - GENERAL: PAINLEVEL_OUTOF10: 9

## 2019-06-06 ASSESSMENT — PAIN DESCRIPTION - FREQUENCY: FREQUENCY: CONTINUOUS

## 2019-06-06 ASSESSMENT — PAIN DESCRIPTION - DESCRIPTORS: DESCRIPTORS: ACHING;CONSTANT

## 2019-06-06 ASSESSMENT — PAIN DESCRIPTION - LOCATION: LOCATION: BACK

## 2019-06-06 ASSESSMENT — PAIN DESCRIPTION - PAIN TYPE: TYPE: CHRONIC PAIN

## 2019-06-06 ASSESSMENT — PAIN DESCRIPTION - PROGRESSION: CLINICAL_PROGRESSION: NOT CHANGED

## 2019-06-06 ASSESSMENT — PAIN DESCRIPTION - DIRECTION: RADIATING_TOWARDS: MID TO UPPER BACK

## 2019-06-06 ASSESSMENT — ACTIVITIES OF DAILY LIVING (ADL): EFFECT OF PAIN ON DAILY ACTIVITIES: LIMITS ACTIVITIES

## 2019-06-06 NOTE — PROGRESS NOTES
[facility]  Office Note    Patient Name: Richard Urbano    MR #: [id number]    Account #: [de-identified]    : 1980    Age: 35 y.o. Sex: female    Date: 2019    PCP: SID Manley    Chief Complaint:   Chief Complaint   Patient presents with    Back Pain     up mid to upper back       History of Present Illness:     Richard Urbano is a 35 y.o. female who presents to the office for chronic back pain. No loss of bowel bladder. Patient does report was seen in [facility] ER for dizziness in interim CAT scan showed assist awaiting MRI report and neurology follow-up \"cyst in brain\". Pt was seen cardiology - Dr. Nitin Cabrera. Cleared for injections. Pt remains taking cymbalta, zanaflex, and motrin. Would like to stay away from opiate medication at this present time. Interested in continuing injections.     Past Visit HPI: I read last pain management note    Current PE.3    Past PE.7    ORT Score:11    PHQ-9 Score: 12    Current Pain Assessment  Pain Assessment  Pain Assessment: 0-10  Pain Level: 9  Patient's Stated Pain Goal: No pain  Pain Type: Chronic pain  Pain Location: Back  Pain Orientation: Mid, Upper  Pain Radiating Towards: mid to upper back  Pain Descriptors: Aching, Constant  Pain Frequency: Continuous  Pain Onset: On-going  Clinical Progression: Not changed  Effect of Pain on Daily Activities: limits activities  Functional Pain Assessment: Prevents or interferes some active activities and ADLs  Non-Pharmaceutical Pain Intervention(s): Rest  Response to Pain Intervention: None  Multiple Pain Sites: No    Employment:     Past Medical Histoy  Past Medical History:   Diagnosis Date    Asthma     Bipolar 1 disorder ([facility] [address]     Degenerative disc disease, lumbar     Depression     Foot fracture, right     Hypertension     Indigestion     Lumbar radiculopathy     Migraines     Neuropathy        Surgery History  Past Surgical History:   Procedure Laterality Date   SECTION      ELBOW SURGERY      right    RECTAL SURGERY      x2        Family History  family history includes Arthritis in her mother; Heart Attack in her father, maternal grandfather, and maternal grandmother; Hypertension in her mother; Kidney Disease in her brother; Mental Illness in her brother, paternal grandfather, and paternal grandmother; No Known Problems in her sister; Other in her daughter and son. Social History    Social History     Tobacco Use    Smoking status: Former Smoker     Packs/day: 1.00     Years: 10.00     Pack years: 10.00     Types: Cigarettes     Last attempt to quit: 2017     Years since quittin.5    Smokeless tobacco: Never Used   Substance Use Topics    Alcohol use: Yes     Alcohol/week: 1.2 oz     Types: 2 Cans of beer per week     Comment: last drink was 8-9 months ago. No prior blackouts       Allergies  Pcn [penicillins] and Sulfa antibiotics     Current Medications  Current Outpatient Medications   Medication Sig Dispense Refill    tiZANidine (ZANAFLEX) 4 MG tablet Take 1 tablet by mouth 3 times daily as needed (muscle spasms) 90 tablet 2    zolpidem (AMBIEN) 5 MG tablet Take 1 tablet by mouth nightly as needed for Sleep for up to 30 days.  30 tablet 0    nortriptyline (PAMELOR) 75 MG capsule Take 1 capsule by mouth nightly 90 capsule 1    DULoxetine (CYMBALTA) 30 MG extended release capsule Take 1 capsule by mouth 2 times daily 60 capsule 5    meclizine (ANTIVERT) 25 MG tablet Take 1 tablet by mouth 3 times daily as needed for Dizziness or Nausea 30 tablet 0    verapamil (CALAN SR) 180 MG extended release tablet TAKE 1 TABLET BY MOUTH EVERY DAY AT NIGHT 30 tablet 5    lurasidone (LATUDA) 40 MG TABS tablet Take 1 tablet by mouth daily 30 tablet 2    atorvastatin (LIPITOR) 20 MG tablet Take 1 tablet by mouth daily 30 tablet 5    ibuprofen (ADVIL;MOTRIN) 800 MG tablet Take 1 tablet by mouth every 6 hours as needed for Pain 60 tablet 1    normal. There is no tenderness. There is no guarding. Musculoskeletal:        Cervical back: She exhibits tenderness. Thoracic back: She exhibits tenderness. Lumbar back: She exhibits decreased range of motion ( Lumbar flexion less than 75° extension less than 15° with bilateral facet loading) and tenderness. Back:    Neurological: She is alert and oriented to person, place, and time. Skin: Skin is warm and dry. She is not diaphoretic. No erythema. No pallor. Psychiatric: She has a normal mood and affect. Her behavior is normal. Judgment and thought content normal. She is not aggressive and not hyperactive. She expresses no homicidal and no suicidal ideation. Nursing note and vitals reviewed. LAST UDS: 3/20/2019 compliance    Labs:  Lab Results   Component Value Date     05/14/2019    K 4.4 05/14/2019     05/14/2019    CO2 23 05/14/2019    BUN 13 05/14/2019    CREATININE 1.2 05/14/2019    GLUCOSE 140 05/14/2019    CALCIUM 8.9 05/14/2019        Lab Results   Component Value Date    WBC 10.3 05/14/2019    HGB 14.9 05/14/2019    HCT 46.8 05/14/2019    MCV 91.1 05/14/2019     05/14/2019       Recent Imaging:  EXAMINATION: XR LUMBAR SPINE FLEXION AND EXTENSION ONLY 4/24/2019   11:03 AM   HISTORY: Lumbosacral spondylosis without myelopathy. Report: Neutral lateral, flexion and extension views of the lumbar   spine were obtained. There are no comparison studies. There is moderate disc space narrowing and hypertrophic and plate   spurring at X6-K2. The other disc spaces are preserved. Alignment is   normal on the neutral lateral view. With flexion and extension, there   is no evidence of instability. No fracture is identified.        Impression   Moderate L5-S1 spondylosis with no evidence of   spondylolisthesis or instability with flexion and extension. Signed by Dr Everett Morelos.  Shakeel on 4/24/2019 11:05 AM           Principal Problem:    DDD (degenerative disc disease), lumbar  Active Problems:    Lumbosacral spondylosis without myelopathy    Myofascial pain  Resolved Problems:    * No resolved hospital problems. *      Plan:  1. I have discussed and schedule cervical and Thoracic TPI valorie- pt has seen cardiology. 2. I have discussed and ordered Bilateral facet injections L4-l5 and L5-s1 injections Dr. Tex Ellis. 3. Continue taking cymbalta, zanaflex, motrin effective  4. Follow up with neurology Dr. Nii Patino  5. Continue home stretches and exercises. 6. Encouraged to  follow-up with primary care as directed          Discussion: Discussed exam findings and plan of care with patient. Patient agreed with POC and questions were asked and answered. Activity: discussed exercise as beneficial to pain reduction, encouraged stretching exercisewith a focus on torso strengthening. Education Provided by Provider:  Review of Kirke Shirley / Agreement Review / ORT Calculated / PHQ-9 Reviewed / Compliance Issues Discussed / Cognitive Behavior Needs / Exercise / Review of Test / Financial Issues / Teaching / Smoking Cessation / Tobacco/Alcohol Use    Controlled Substance Monitoring:   Discussed with patient possible medication side effects, risk of tolerance, dependenceand alternative treatments. Discussed the growing epidemic in the [place] with the overprescribing and at times the abuse of narcotics. Discussed the detrimental effects of long term narcotic use in younger patients. Patientencouraged to set daily goals of exercising and if on narcotics decreasing daily narcotic intake. Discussed with the patient about the development of hyperalgesia with long term narcotic intake. CC:  SID Haynes APRN, 6/6/2019 at 1:27 PM    EMR dragon/transcription disclaimer: Much of this encounter note is electronic transcription/translation of spoken language to printed tach.  Electronic translation of spoken language may be erroneous, or at times, nonsensical words or phrases may be inadvertently transcribed.  Although, I have reviewed the note for such errors, some may still exist.

## 2019-06-06 NOTE — PROGRESS NOTES
EPIC      [] Suspected Physical Abuse or Suicide Risk assessed - IF YES COMPLETE QUESTIONS BELOW    If any of the following questions are answered yes - contact attending physician for referral:    Has been considering harming self to escape stress, pain problems? [] YES  [] NO  Has a suicide plan? [] YES  [] NO  Has attempted suicide in the past?   [] YES  [] NO  Has a close friend or family member who committed suicide?   [] YES  [] NO    Assessment Completed by:  Electronically signed by Eugene Garvin RN on 6/6/2019 at 10:27 AM

## 2019-06-09 ENCOUNTER — HOSPITAL ENCOUNTER (EMERGENCY)
Age: 39
Discharge: HOME | End: 2019-06-09
Attending: EMERGENCY MEDICINE
Payer: MEDICAID

## 2019-06-09 VITALS
DIASTOLIC BLOOD PRESSURE: 86 MMHG | RESPIRATION RATE: 18 BRPM | OXYGEN SATURATION: 7 % | HEIGHT: 64 IN | SYSTOLIC BLOOD PRESSURE: 120 MMHG | HEART RATE: 54 BPM | BODY MASS INDEX: 46.1 KG/M2 | TEMPERATURE: 97.3 F | WEIGHT: 270 LBS

## 2019-06-09 DIAGNOSIS — H81.10 BENIGN PAROXYSMAL POSITIONAL VERTIGO, UNSPECIFIED LATERALITY: Primary | ICD-10-CM

## 2019-06-09 DIAGNOSIS — R55 NEAR SYNCOPE: ICD-10-CM

## 2019-06-09 LAB
ALBUMIN SERPL-MCNC: 4.2 G/DL (ref 3.5–5.2)
ALP BLD-CCNC: 99 U/L (ref 35–104)
ALT SERPL-CCNC: 36 U/L (ref 5–33)
ANION GAP SERPL CALCULATED.3IONS-SCNC: 14 MMOL/L (ref 7–19)
AST SERPL-CCNC: 41 U/L (ref 5–32)
BASOPHILS ABSOLUTE: 0.1 K/UL (ref 0–0.2)
BASOPHILS RELATIVE PERCENT: 0.5 % (ref 0–1)
BILIRUB SERPL-MCNC: 0.3 MG/DL (ref 0.2–1.2)
BUN BLDV-MCNC: 10 MG/DL (ref 6–20)
CALCIUM SERPL-MCNC: 9.3 MG/DL (ref 8.6–10)
CHLORIDE BLD-SCNC: 99 MMOL/L (ref 98–111)
CO2: 24 MMOL/L (ref 22–29)
CREAT SERPL-MCNC: 1 MG/DL (ref 0.5–0.9)
EOSINOPHILS ABSOLUTE: 0.1 K/UL (ref 0–0.6)
EOSINOPHILS RELATIVE PERCENT: 0.7 % (ref 0–5)
GFR NON-AFRICAN AMERICAN: >60
GLUCOSE BLD-MCNC: 132 MG/DL (ref 74–109)
HCG QUALITATIVE: NEGATIVE
HCT VFR BLD CALC: 46.3 % (ref 37–47)
HEMOGLOBIN: 14.9 G/DL (ref 12–16)
LYMPHOCYTES ABSOLUTE: 3.1 K/UL (ref 1.1–4.5)
LYMPHOCYTES RELATIVE PERCENT: 31.6 % (ref 20–40)
MCH RBC QN AUTO: 29.2 PG (ref 27–31)
MCHC RBC AUTO-ENTMCNC: 32.2 G/DL (ref 33–37)
MCV RBC AUTO: 90.6 FL (ref 81–99)
MONOCYTES ABSOLUTE: 0.5 K/UL (ref 0–0.9)
MONOCYTES RELATIVE PERCENT: 4.9 % (ref 0–10)
NEUTROPHILS ABSOLUTE: 6 K/UL (ref 1.5–7.5)
NEUTROPHILS RELATIVE PERCENT: 61.8 % (ref 50–65)
PDW BLD-RTO: 11.9 % (ref 11.5–14.5)
PLATELET # BLD: 362 K/UL (ref 130–400)
PMV BLD AUTO: 10 FL (ref 9.4–12.3)
POTASSIUM SERPL-SCNC: 4.2 MMOL/L (ref 3.5–5)
RBC # BLD: 5.11 M/UL (ref 4.2–5.4)
SODIUM BLD-SCNC: 137 MMOL/L (ref 136–145)
TOTAL PROTEIN: 7.7 G/DL (ref 6.6–8.7)
WBC # BLD: 9.7 K/UL (ref 4.8–10.8)

## 2019-06-09 PROCEDURE — 99284 EMERGENCY DEPT VISIT MOD MDM: CPT

## 2019-06-09 PROCEDURE — 85025 COMPLETE CBC W/AUTO DIFF WBC: CPT

## 2019-06-09 PROCEDURE — 6360000002 HC RX W HCPCS: Performed by: EMERGENCY MEDICINE

## 2019-06-09 PROCEDURE — 84703 CHORIONIC GONADOTROPIN ASSAY: CPT

## 2019-06-09 PROCEDURE — 96374 THER/PROPH/DIAG INJ IV PUSH: CPT

## 2019-06-09 PROCEDURE — 80053 COMPREHEN METABOLIC PANEL: CPT

## 2019-06-09 PROCEDURE — 99284 EMERGENCY DEPT VISIT MOD MDM: CPT | Performed by: EMERGENCY MEDICINE

## 2019-06-09 PROCEDURE — 2580000003 HC RX 258: Performed by: EMERGENCY MEDICINE

## 2019-06-09 PROCEDURE — 93005 ELECTROCARDIOGRAM TRACING: CPT

## 2019-06-09 PROCEDURE — 6370000000 HC RX 637 (ALT 250 FOR IP): Performed by: EMERGENCY MEDICINE

## 2019-06-09 PROCEDURE — 36415 COLL VENOUS BLD VENIPUNCTURE: CPT

## 2019-06-09 RX ORDER — MECLIZINE HCL 12.5 MG/1
25 TABLET ORAL ONCE
Status: COMPLETED | OUTPATIENT
Start: 2019-06-09 | End: 2019-06-09

## 2019-06-09 RX ORDER — 0.9 % SODIUM CHLORIDE 0.9 %
1000 INTRAVENOUS SOLUTION INTRAVENOUS ONCE
Status: COMPLETED | OUTPATIENT
Start: 2019-06-09 | End: 2019-06-09

## 2019-06-09 RX ORDER — ONDANSETRON 2 MG/ML
4 INJECTION INTRAMUSCULAR; INTRAVENOUS ONCE
Status: COMPLETED | OUTPATIENT
Start: 2019-06-09 | End: 2019-06-09

## 2019-06-09 RX ADMIN — MECLIZINE 25 MG: 12.5 TABLET ORAL at 13:49

## 2019-06-09 RX ADMIN — ONDANSETRON 4 MG: 2 INJECTION INTRAMUSCULAR; INTRAVENOUS at 13:48

## 2019-06-09 RX ADMIN — SODIUM CHLORIDE 1000 ML: 9 INJECTION, SOLUTION INTRAVENOUS at 13:49

## 2019-06-09 ASSESSMENT — ENCOUNTER SYMPTOMS
DIARRHEA: 0
SORE THROAT: 0
VOMITING: 1
COUGH: 0
SHORTNESS OF BREATH: 0
ABDOMINAL PAIN: 0
NAUSEA: 1
BACK PAIN: 0
RHINORRHEA: 0

## 2019-06-09 ASSESSMENT — PAIN DESCRIPTION - PAIN TYPE: TYPE: ACUTE PAIN

## 2019-06-09 ASSESSMENT — PAIN DESCRIPTION - LOCATION: LOCATION: HEAD

## 2019-06-09 ASSESSMENT — PAIN SCALES - GENERAL: PAINLEVEL_OUTOF10: 5

## 2019-06-09 NOTE — ED TRIAGE NOTES
Dizziness x multiple weeks - seen in ED on 5/14 and referred to Dr. Wade Chaudhry. Had MRI on 6/4 - has not received results.

## 2019-06-10 DIAGNOSIS — R42 DIZZINESS: Primary | ICD-10-CM

## 2019-06-10 LAB
EKG P AXIS: 10 DEGREES
EKG P-R INTERVAL: 170 MS
EKG Q-T INTERVAL: 474 MS
EKG QRS DURATION: 92 MS
EKG QTC CALCULATION (BAZETT): 467 MS
EKG T AXIS: 34 DEGREES

## 2019-06-10 RX ORDER — MECLIZINE HYDROCHLORIDE 25 MG/1
25 TABLET ORAL 3 TIMES DAILY PRN
Qty: 30 TABLET | Refills: 0 | Status: SHIPPED | OUTPATIENT
Start: 2019-06-10 | End: 2019-06-20

## 2019-06-10 NOTE — TELEPHONE ENCOUNTER
Pam Kaitlynn called to request a refill on her medication.       Last office visit : 5/17/2019   Next office visit : 6/18/2019     Requested Prescriptions     Pending Prescriptions Disp Refills    meclizine (ANTIVERT) 25 MG tablet 30 tablet 0     Sig: Take 1 tablet by mouth 3 times daily as needed for Dizziness or Nausea            Cindy Timmons

## 2019-06-24 DIAGNOSIS — R19.7 DIARRHEA, UNSPECIFIED TYPE: ICD-10-CM

## 2019-06-24 RX ORDER — DIPHENOXYLATE HYDROCHLORIDE AND ATROPINE SULFATE 2.5; .025 MG/1; MG/1
1 TABLET ORAL 4 TIMES DAILY PRN
Qty: 120 TABLET | Refills: 1 | Status: SHIPPED | OUTPATIENT
Start: 2019-06-24 | End: 2019-07-24

## 2019-06-24 NOTE — TELEPHONE ENCOUNTER
Vito Bernal called to request a refill on her medication. Last office visit : 5/17/2019   Next office visit : 6/26/2019     Requested Prescriptions     Pending Prescriptions Disp Refills    diphenoxylate-atropine (LOMOTIL) 2.5-0.025 MG per tablet 120 tablet 1     Sig: Take 1 tablet by mouth 4 times daily as needed for Diarrhea for up to 30 days.             Dany Conner

## 2019-10-11 ENCOUNTER — HOSPITAL ENCOUNTER (EMERGENCY)
Facility: HOSPITAL | Age: 39
Discharge: HOME | End: 2019-10-11
Attending: FAMILY MEDICINE | Admitting: FAMILY MEDICINE

## 2019-10-11 ENCOUNTER — APPOINTMENT (OUTPATIENT)
Dept: CT IMAGING | Facility: HOSPITAL | Age: 39
End: 2019-10-11

## 2019-10-11 VITALS
RESPIRATION RATE: 18 BRPM | OXYGEN SATURATION: 99 % | HEIGHT: 64 IN | TEMPERATURE: 98.4 F | HEART RATE: 77 BPM | DIASTOLIC BLOOD PRESSURE: 88 MMHG | SYSTOLIC BLOOD PRESSURE: 135 MMHG | WEIGHT: 260 LBS | BODY MASS INDEX: 44.39 KG/M2

## 2019-10-11 DIAGNOSIS — R10.84 GENERALIZED ABDOMINAL PAIN: Primary | ICD-10-CM

## 2019-10-11 LAB
ALBUMIN SERPL-MCNC: 3.94 G/DL (ref 3.5–5.2)
ALBUMIN/GLOB SERPL: 1.2 G/DL
ALP SERPL-CCNC: 83 U/L (ref 39–117)
ALT SERPL W P-5'-P-CCNC: 22 U/L (ref 1–33)
ANION GAP SERPL CALCULATED.3IONS-SCNC: 13.6 MMOL/L (ref 5–15)
AST SERPL-CCNC: 32 U/L (ref 1–32)
B-HCG UR QL: NEGATIVE
BACTERIA UR QL AUTO: ABNORMAL /HPF
BASOPHILS # BLD AUTO: 0.01 10*3/MM3 (ref 0–0.2)
BASOPHILS NFR BLD AUTO: 0.1 % (ref 0–1.5)
BILIRUB SERPL-MCNC: 0.4 MG/DL (ref 0.2–1.2)
BILIRUB UR QL STRIP: NEGATIVE
BUN BLD-MCNC: 7 MG/DL (ref 6–20)
BUN/CREAT SERPL: 7.8 (ref 7–25)
CALCIUM SPEC-SCNC: 9.1 MG/DL (ref 8.6–10.5)
CHLORIDE SERPL-SCNC: 103 MMOL/L (ref 98–107)
CK SERPL-CCNC: 108 U/L (ref 20–180)
CLARITY UR: ABNORMAL
CO2 SERPL-SCNC: 23.4 MMOL/L (ref 22–29)
COLOR UR: YELLOW
CREAT BLD-MCNC: 0.9 MG/DL (ref 0.57–1)
CRP SERPL-MCNC: 0.32 MG/DL (ref 0–0.5)
D-LACTATE SERPL-SCNC: 2 MMOL/L (ref 0.5–2)
DEPRECATED RDW RBC AUTO: 42.8 FL (ref 37–54)
EOSINOPHIL # BLD AUTO: 0.08 10*3/MM3 (ref 0–0.4)
EOSINOPHIL NFR BLD AUTO: 1.1 % (ref 0.3–6.2)
ERYTHROCYTE [DISTWIDTH] IN BLOOD BY AUTOMATED COUNT: 12.7 % (ref 12.3–15.4)
GFR SERPL CREATININE-BSD FRML MDRD: 69 ML/MIN/1.73
GLOBULIN UR ELPH-MCNC: 3.3 GM/DL
GLUCOSE BLD-MCNC: 119 MG/DL (ref 65–99)
GLUCOSE UR STRIP-MCNC: ABNORMAL MG/DL
H PYLORI IGG SER IA-ACNC: NEGATIVE
HCG SERPL QL: NEGATIVE
HCT VFR BLD AUTO: 47.1 % (ref 34–46.6)
HGB BLD-MCNC: 15.2 G/DL (ref 12–15.9)
HGB UR QL STRIP.AUTO: NEGATIVE
HOLD SPECIMEN: NORMAL
HOLD SPECIMEN: NORMAL
HYALINE CASTS UR QL AUTO: ABNORMAL /LPF
IMM GRANULOCYTES # BLD AUTO: 0.02 10*3/MM3 (ref 0–0.05)
IMM GRANULOCYTES NFR BLD AUTO: 0.3 % (ref 0–0.5)
KETONES UR QL STRIP: ABNORMAL
LEUKOCYTE ESTERASE UR QL STRIP.AUTO: ABNORMAL
LIPASE SERPL-CCNC: 24 U/L (ref 13–60)
LYMPHOCYTES # BLD AUTO: 2.44 10*3/MM3 (ref 0.7–3.1)
LYMPHOCYTES NFR BLD AUTO: 33.3 % (ref 19.6–45.3)
MAGNESIUM SERPL-MCNC: 1.8 MG/DL (ref 1.6–2.6)
MCH RBC QN AUTO: 29.7 PG (ref 26.6–33)
MCHC RBC AUTO-ENTMCNC: 32.3 G/DL (ref 31.5–35.7)
MCV RBC AUTO: 92 FL (ref 79–97)
MONOCYTES # BLD AUTO: 0.52 10*3/MM3 (ref 0.1–0.9)
MONOCYTES NFR BLD AUTO: 7.1 % (ref 5–12)
MUCOUS THREADS URNS QL MICRO: ABNORMAL /HPF
NEUTROPHILS # BLD AUTO: 4.25 10*3/MM3 (ref 1.7–7)
NEUTROPHILS NFR BLD AUTO: 58.1 % (ref 42.7–76)
NITRITE UR QL STRIP: NEGATIVE
PH UR STRIP.AUTO: 6.5 [PH] (ref 5–8)
PLATELET # BLD AUTO: 290 10*3/MM3 (ref 140–450)
PMV BLD AUTO: 11.4 FL (ref 6–12)
POTASSIUM BLD-SCNC: 4.4 MMOL/L (ref 3.5–5.2)
PROT SERPL-MCNC: 7.2 G/DL (ref 6–8.5)
PROT UR QL STRIP: NEGATIVE
RBC # BLD AUTO: 5.12 10*6/MM3 (ref 3.77–5.28)
RBC # UR: ABNORMAL /HPF
REF LAB TEST METHOD: ABNORMAL
SODIUM BLD-SCNC: 140 MMOL/L (ref 136–145)
SP GR UR STRIP: 1.02 (ref 1–1.03)
SQUAMOUS #/AREA URNS HPF: ABNORMAL /HPF
UROBILINOGEN UR QL STRIP: ABNORMAL
WBC NRBC COR # BLD: 7.32 10*3/MM3 (ref 3.4–10.8)
WBC UR QL AUTO: ABNORMAL /HPF
WHOLE BLOOD HOLD SPECIMEN: NORMAL
WHOLE BLOOD HOLD SPECIMEN: NORMAL

## 2019-10-11 PROCEDURE — 0 IOVERSOL 68 % SOLUTION: Performed by: FAMILY MEDICINE

## 2019-10-11 PROCEDURE — 99284 EMERGENCY DEPT VISIT MOD MDM: CPT

## 2019-10-11 PROCEDURE — 25010000002 KETOROLAC TROMETHAMINE PER 15 MG: Performed by: PHYSICIAN ASSISTANT

## 2019-10-11 PROCEDURE — 81025 URINE PREGNANCY TEST: CPT | Performed by: PHYSICIAN ASSISTANT

## 2019-10-11 PROCEDURE — 86677 HELICOBACTER PYLORI ANTIBODY: CPT | Performed by: PHYSICIAN ASSISTANT

## 2019-10-11 PROCEDURE — 85025 COMPLETE CBC W/AUTO DIFF WBC: CPT | Performed by: FAMILY MEDICINE

## 2019-10-11 PROCEDURE — 25010000002 ONDANSETRON PER 1 MG: Performed by: FAMILY MEDICINE

## 2019-10-11 PROCEDURE — 96374 THER/PROPH/DIAG INJ IV PUSH: CPT

## 2019-10-11 PROCEDURE — 80053 COMPREHEN METABOLIC PANEL: CPT | Performed by: FAMILY MEDICINE

## 2019-10-11 PROCEDURE — 96376 TX/PRO/DX INJ SAME DRUG ADON: CPT

## 2019-10-11 PROCEDURE — 81001 URINALYSIS AUTO W/SCOPE: CPT | Performed by: FAMILY MEDICINE

## 2019-10-11 PROCEDURE — 63710000001 PROMETHAZINE PER 25 MG: Performed by: PHYSICIAN ASSISTANT

## 2019-10-11 PROCEDURE — 83690 ASSAY OF LIPASE: CPT | Performed by: FAMILY MEDICINE

## 2019-10-11 PROCEDURE — 87040 BLOOD CULTURE FOR BACTERIA: CPT | Performed by: FAMILY MEDICINE

## 2019-10-11 PROCEDURE — 83605 ASSAY OF LACTIC ACID: CPT | Performed by: FAMILY MEDICINE

## 2019-10-11 PROCEDURE — 83735 ASSAY OF MAGNESIUM: CPT | Performed by: FAMILY MEDICINE

## 2019-10-11 PROCEDURE — 25010000002 MORPHINE PER 10 MG: Performed by: FAMILY MEDICINE

## 2019-10-11 PROCEDURE — 74177 CT ABD & PELVIS W/CONTRAST: CPT | Performed by: RADIOLOGY

## 2019-10-11 PROCEDURE — 84703 CHORIONIC GONADOTROPIN ASSAY: CPT | Performed by: PHYSICIAN ASSISTANT

## 2019-10-11 PROCEDURE — 96375 TX/PRO/DX INJ NEW DRUG ADDON: CPT

## 2019-10-11 PROCEDURE — 82550 ASSAY OF CK (CPK): CPT | Performed by: FAMILY MEDICINE

## 2019-10-11 PROCEDURE — 74177 CT ABD & PELVIS W/CONTRAST: CPT

## 2019-10-11 PROCEDURE — 86140 C-REACTIVE PROTEIN: CPT | Performed by: FAMILY MEDICINE

## 2019-10-11 RX ORDER — KETOROLAC TROMETHAMINE 30 MG/ML
30 INJECTION, SOLUTION INTRAMUSCULAR; INTRAVENOUS ONCE
Status: COMPLETED | OUTPATIENT
Start: 2019-10-11 | End: 2019-10-11

## 2019-10-11 RX ORDER — ONDANSETRON 4 MG/1
4 TABLET, ORALLY DISINTEGRATING ORAL EVERY 6 HOURS PRN
Qty: 20 TABLET | Refills: 0 | Status: SHIPPED | OUTPATIENT
Start: 2019-10-11 | End: 2023-03-24

## 2019-10-11 RX ORDER — ONDANSETRON 2 MG/ML
4 INJECTION INTRAMUSCULAR; INTRAVENOUS ONCE
Status: COMPLETED | OUTPATIENT
Start: 2019-10-11 | End: 2019-10-11

## 2019-10-11 RX ORDER — MORPHINE SULFATE 2 MG/ML
2 INJECTION, SOLUTION INTRAMUSCULAR; INTRAVENOUS ONCE
Status: COMPLETED | OUTPATIENT
Start: 2019-10-11 | End: 2019-10-11

## 2019-10-11 RX ORDER — DICYCLOMINE HCL 20 MG
20 TABLET ORAL EVERY 6 HOURS PRN
Qty: 20 TABLET | Refills: 0 | Status: SHIPPED | OUTPATIENT
Start: 2019-10-11 | End: 2023-01-17

## 2019-10-11 RX ORDER — SODIUM CHLORIDE 0.9 % (FLUSH) 0.9 %
10 SYRINGE (ML) INJECTION AS NEEDED
Status: DISCONTINUED | OUTPATIENT
Start: 2019-10-11 | End: 2019-10-11 | Stop reason: HOSPADM

## 2019-10-11 RX ORDER — PROMETHAZINE HYDROCHLORIDE 25 MG/1
25 TABLET ORAL EVERY 8 HOURS PRN
Status: DISCONTINUED | OUTPATIENT
Start: 2019-10-11 | End: 2019-10-11 | Stop reason: HOSPADM

## 2019-10-11 RX ADMIN — MORPHINE SULFATE 4 MG: 4 INJECTION INTRAVENOUS at 14:20

## 2019-10-11 RX ADMIN — ONDANSETRON 4 MG: 2 INJECTION, SOLUTION INTRAMUSCULAR; INTRAVENOUS at 14:20

## 2019-10-11 RX ADMIN — SODIUM CHLORIDE 1000 ML: 9 INJECTION, SOLUTION INTRAVENOUS at 14:20

## 2019-10-11 RX ADMIN — PROMETHAZINE HYDROCHLORIDE 25 MG: 25 TABLET ORAL at 20:11

## 2019-10-11 RX ADMIN — KETOROLAC TROMETHAMINE 30 MG: 30 INJECTION, SOLUTION INTRAMUSCULAR; INTRAVENOUS at 20:03

## 2019-10-11 RX ADMIN — MORPHINE SULFATE 2 MG: 2 INJECTION, SOLUTION INTRAMUSCULAR; INTRAVENOUS at 17:01

## 2019-10-11 RX ADMIN — IOVERSOL 95 ML: 678 INJECTION INTRA-ARTERIAL; INTRAVENOUS at 17:18

## 2019-10-11 RX ADMIN — ONDANSETRON 4 MG: 2 INJECTION, SOLUTION INTRAMUSCULAR; INTRAVENOUS at 17:01

## 2019-10-12 NOTE — ED PROVIDER NOTES
Subjective   39-year-old female presents to the ER with complaints of abdominal pain.  Patient states that she was recently assessed at [facility] ER where she had a normal work-up.  Family member that is present with patient states that they called her and notified her that she had internal bleeding.  Patient denies any black or bloody stools.  Patient admits to history of a functional dyspepsia.  States that this was diagnosed in [place].        History provided by:  Patient  Abdominal Pain   Pain location:  Generalized  Pain quality: aching    Pain radiates to:  Does not radiate  Pain severity:  Moderate  Onset quality:  Gradual  Timing:  Constant  Progression:  Worsening  Chronicity:  Recurrent  Relieved by:  Nothing  Associated symptoms: nausea and vomiting    Associated symptoms: no chest pain, no dysuria and no fever    Risk factors: obesity        Review of Systems   Constitutional: Negative.  Negative for fever.   HENT: Negative.    Respiratory: Negative.    Cardiovascular: Negative.  Negative for chest pain.   Gastrointestinal: Positive for abdominal pain, nausea and vomiting.   Endocrine: Negative.    Genitourinary: Negative.  Negative for dysuria.   Skin: Negative.    Neurological: Negative.    Psychiatric/Behavioral: Negative.    All other systems reviewed and are negative.      No past medical history on file.    Allergies   Allergen Reactions   • Amoxicillin Anaphylaxis and Hives   • Penicillins Anaphylaxis and Hives   • Sulfa Antibiotics Anaphylaxis and Hives       No past surgical history on file.    No family history on file.    Social History     Socioeconomic History   • Marital status:      Spouse name: Not on file   • Number of children: Not on file   • Years of education: Not on file   • Highest education level: Not on file           Objective   Physical Exam   Constitutional: She is oriented to person, place, and time. She appears well-developed and well-nourished. No  distress.   HENT:   Head: Normocephalic and atraumatic.   Right Ear: External ear normal.   Left Ear: External ear normal.   Nose: Nose normal.   Eyes: Conjunctivae are normal.   Neck: Normal range of motion. Neck supple. No JVD present. No tracheal deviation present.   Cardiovascular: Normal rate, regular rhythm and normal heart sounds.   No murmur heard.  Pulmonary/Chest: Effort normal and breath sounds normal. No respiratory distress. She has no wheezes.   Abdominal: Soft. Normal appearance and bowel sounds are normal. There is generalized tenderness.   Musculoskeletal: Normal range of motion. She exhibits no edema or deformity.   Neurological: She is alert and oriented to person, place, and time. No cranial nerve deficit.   Skin: Skin is warm and dry. No rash noted. She is not diaphoretic. No erythema. No pallor.   Psychiatric: She has a normal mood and affect. Her behavior is normal. Thought content normal.   Nursing note and vitals reviewed.      Procedures           ED Course  ED Course as of Oct 12 0128   Fri Oct 11, 2019   1833 CT rad interpreted:  1. Unremarkable exam.  No source for the patient symptoms.  2. Other incidental/non-acute findings as above.  [RB]      ED Course User Index  [RB] Ramy Vazquez PA                  MDM  Number of Diagnoses or Management Options  Generalized abdominal pain: established and worsening     Amount and/or Complexity of Data Reviewed  Clinical lab tests: ordered and reviewed  Tests in the radiology section of CPT®: ordered and reviewed    Risk of Complications, Morbidity, and/or Mortality  Presenting problems: moderate  Diagnostic procedures: moderate  Management options: low    Patient Progress  Patient progress: stable      Final diagnoses:   Generalized abdominal pain              Ramy Vazquez PA  10/12/19 0132

## 2019-10-16 LAB — BACTERIA SPEC AEROBE CULT: NORMAL

## 2020-01-13 ENCOUNTER — CONSULT (OUTPATIENT)
Dept: ONCOLOGY | Facility: CLINIC | Age: 40
End: 2020-01-13

## 2020-01-13 VITALS
HEART RATE: 93 BPM | SYSTOLIC BLOOD PRESSURE: 124 MMHG | OXYGEN SATURATION: 98 % | DIASTOLIC BLOOD PRESSURE: 88 MMHG | TEMPERATURE: 97.8 F | HEIGHT: 64 IN | RESPIRATION RATE: 18 BRPM | WEIGHT: 225.9 LBS | BODY MASS INDEX: 38.57 KG/M2

## 2020-01-13 DIAGNOSIS — D75.1 ERYTHROCYTOSIS: Primary | ICD-10-CM

## 2020-01-13 DIAGNOSIS — E53.8 FOLIC ACID DEFICIENCY: ICD-10-CM

## 2020-01-13 LAB
ALBUMIN SERPL-MCNC: 3.64 G/DL (ref 3.5–5.2)
ALBUMIN/GLOB SERPL: 1 G/DL
ALP SERPL-CCNC: 88 U/L (ref 39–117)
ALT SERPL W P-5'-P-CCNC: 13 U/L (ref 1–33)
ANION GAP SERPL CALCULATED.3IONS-SCNC: 13.3 MMOL/L (ref 5–15)
AST SERPL-CCNC: 16 U/L (ref 1–32)
BASOPHILS # BLD AUTO: 0.04 10*3/MM3 (ref 0–0.2)
BASOPHILS NFR BLD AUTO: 0.4 % (ref 0–1.5)
BILIRUB SERPL-MCNC: 0.4 MG/DL (ref 0.2–1.2)
BUN BLD-MCNC: 10 MG/DL (ref 6–20)
BUN/CREAT SERPL: 14.5 (ref 7–25)
CALCIUM SPEC-SCNC: 8.8 MG/DL (ref 8.6–10.5)
CHLORIDE SERPL-SCNC: 104 MMOL/L (ref 98–107)
CO2 SERPL-SCNC: 17.7 MMOL/L (ref 22–29)
CREAT BLD-MCNC: 0.69 MG/DL (ref 0.57–1)
DEPRECATED RDW RBC AUTO: 41.1 FL (ref 37–54)
EOSINOPHIL # BLD AUTO: 0.09 10*3/MM3 (ref 0–0.4)
EOSINOPHIL NFR BLD AUTO: 0.9 % (ref 0.3–6.2)
ERYTHROCYTE [DISTWIDTH] IN BLOOD BY AUTOMATED COUNT: 12.2 % (ref 12.3–15.4)
FERRITIN SERPL-MCNC: 42.91 NG/ML (ref 13–150)
FOLATE SERPL-MCNC: 9.63 NG/ML (ref 4.78–24.2)
GFR SERPL CREATININE-BSD FRML MDRD: 94 ML/MIN/1.73
GLOBULIN UR ELPH-MCNC: 3.6 GM/DL
GLUCOSE BLD-MCNC: 146 MG/DL (ref 65–99)
HCT VFR BLD AUTO: 50.3 % (ref 34–46.6)
HGB BLD-MCNC: 15.8 G/DL (ref 12–15.9)
IMM GRANULOCYTES # BLD AUTO: 0.07 10*3/MM3 (ref 0–0.05)
IMM GRANULOCYTES NFR BLD AUTO: 0.7 % (ref 0–0.5)
IRON 24H UR-MRATE: 98 MCG/DL (ref 37–145)
IRON SATN MFR SERPL: 21 % (ref 20–50)
LYMPHOCYTES # BLD AUTO: 2.34 10*3/MM3 (ref 0.7–3.1)
LYMPHOCYTES NFR BLD AUTO: 23.2 % (ref 19.6–45.3)
MCH RBC QN AUTO: 28.6 PG (ref 26.6–33)
MCHC RBC AUTO-ENTMCNC: 31.4 G/DL (ref 31.5–35.7)
MCV RBC AUTO: 91 FL (ref 79–97)
MONOCYTES # BLD AUTO: 0.5 10*3/MM3 (ref 0.1–0.9)
MONOCYTES NFR BLD AUTO: 5 % (ref 5–12)
NEUTROPHILS # BLD AUTO: 7.06 10*3/MM3 (ref 1.7–7)
NEUTROPHILS NFR BLD AUTO: 69.8 % (ref 42.7–76)
NRBC BLD AUTO-RTO: 0 /100 WBC (ref 0–0.2)
PLATELET # BLD AUTO: 335 10*3/MM3 (ref 140–450)
PMV BLD AUTO: 10.2 FL (ref 6–12)
POTASSIUM BLD-SCNC: 3.8 MMOL/L (ref 3.5–5.2)
PROT SERPL-MCNC: 7.2 G/DL (ref 6–8.5)
RBC # BLD AUTO: 5.53 10*6/MM3 (ref 3.77–5.28)
SODIUM BLD-SCNC: 135 MMOL/L (ref 136–145)
TIBC SERPL-MCNC: 468 MCG/DL (ref 298–536)
TRANSFERRIN SERPL-MCNC: 314 MG/DL (ref 200–360)
WBC NRBC COR # BLD: 10.1 10*3/MM3 (ref 3.4–10.8)

## 2020-01-13 PROCEDURE — 82668 ASSAY OF ERYTHROPOIETIN: CPT | Performed by: NURSE PRACTITIONER

## 2020-01-13 PROCEDURE — 84466 ASSAY OF TRANSFERRIN: CPT | Performed by: NURSE PRACTITIONER

## 2020-01-13 PROCEDURE — 85060 BLOOD SMEAR INTERPRETATION: CPT | Performed by: NURSE PRACTITIONER

## 2020-01-13 PROCEDURE — 81207 BCR/ABL1 GENE MINOR BP: CPT

## 2020-01-13 PROCEDURE — 99204 OFFICE O/P NEW MOD 45 MIN: CPT | Performed by: NURSE PRACTITIONER

## 2020-01-13 PROCEDURE — 36415 COLL VENOUS BLD VENIPUNCTURE: CPT | Performed by: NURSE PRACTITIONER

## 2020-01-13 PROCEDURE — 82746 ASSAY OF FOLIC ACID SERUM: CPT | Performed by: NURSE PRACTITIONER

## 2020-01-13 PROCEDURE — 85025 COMPLETE CBC W/AUTO DIFF WBC: CPT | Performed by: NURSE PRACTITIONER

## 2020-01-13 PROCEDURE — 83540 ASSAY OF IRON: CPT | Performed by: NURSE PRACTITIONER

## 2020-01-13 PROCEDURE — 81270 JAK2 GENE: CPT | Performed by: NURSE PRACTITIONER

## 2020-01-13 PROCEDURE — 81206 BCR/ABL1 GENE MAJOR BP: CPT

## 2020-01-13 PROCEDURE — 80053 COMPREHEN METABOLIC PANEL: CPT | Performed by: NURSE PRACTITIONER

## 2020-01-13 PROCEDURE — 82728 ASSAY OF FERRITIN: CPT | Performed by: NURSE PRACTITIONER

## 2020-01-13 PROCEDURE — 81270 JAK2 GENE: CPT

## 2020-01-13 RX ORDER — TIZANIDINE 4 MG/1
4 TABLET ORAL 3 TIMES DAILY
COMMUNITY

## 2020-01-13 RX ORDER — NORTRIPTYLINE HYDROCHLORIDE 75 MG/1
75 CAPSULE ORAL NIGHTLY
COMMUNITY

## 2020-01-13 RX ORDER — DIPHENOXYLATE HYDROCHLORIDE AND ATROPINE SULFATE 2.5; .025 MG/1; MG/1
1 TABLET ORAL 4 TIMES DAILY PRN
COMMUNITY

## 2020-01-13 RX ORDER — MECLIZINE HCL 25MG 25 MG/1
25 TABLET, CHEWABLE ORAL 3 TIMES DAILY PRN
COMMUNITY

## 2020-01-13 RX ORDER — ERGOCALCIFEROL 1.25 MG/1
50000 CAPSULE ORAL WEEKLY
COMMUNITY

## 2020-01-13 RX ORDER — ZOLPIDEM TARTRATE 5 MG/1
5 TABLET ORAL NIGHTLY PRN
COMMUNITY
End: 2023-01-17 | Stop reason: ALTCHOICE

## 2020-01-13 RX ORDER — FOLIC ACID 1 MG/1
1 TABLET ORAL DAILY
COMMUNITY
End: 2020-02-10 | Stop reason: SDUPTHER

## 2020-01-13 NOTE — PROGRESS NOTES
DATE OF CONSULTATION:  2020    REASON FOR REFERRAL: Erythrocytosis    REFERRING PHYSICIAN:  BOLIVAR Negro    CHIEF COMPLAINT:  Fatigue, migraines, dizziness    HISTORY OF PRESENT ILLNESS:   Sherrill Nathan is a 39 y.o. female who is being seen today at the request of BOLIVAR Negro for evaluation and treatment of erythrocytosis. Ms. Nathan reports establishing care with her PCP in 2019 and has been following with her routinely since then. In 2019, she had blood testing with PCP and noted normal Hg/Hct. Previous available CBCs reviewed. Her Hg/Hct remained normal until 2019 and had mildly elevated Hct at that time (47.1). Most recent CBC done on 1/3/20  showed elevated Hg (17.2) and Hct (53.3). Ms. Nathan reports smoking since she was 11 years old but has intermittently quit and restarted several times.  Patient reports she restarted smoking in 2019 and currently smokes 1/2-1 PPD. She reports history of GURPREET but has not used CPAP for the past couple of years as she used this in [place] prior to moving to [place]. She complains of chronic fatigue. She reports having migraines and occasional dizziness and takes medicine per PCP. She also complains of neuropathy in bilateral hands/feet (worse in right hand and left foot). She denies any other complaints today.     PAST MEDICAL HISTORY:  Past Medical History:   Diagnosis Date   • Arthritis    • Asthma    • Migraines    • Sleep apnea        PAST SURGICAL HISTORY:  Past Surgical History:   Procedure Laterality Date   •  SECTION     • RECTAL SURGERY         FAMILY HISTORY:  Family History   Problem Relation Age of Onset   • Leukemia Maternal Grandmother    • Breast cancer Maternal Grandfather        SOCIAL HISTORY:  Social History     Socioeconomic History   • Marital status:      Spouse name: Not on file   • Number of children: Not on file   • Years of education: Not on file   • Highest education level: Not on file  "      MEDICATIONS:  The current medication list was reviewed in the EMR    Current Outpatient Medications:   •  dicyclomine (BENTYL) 20 MG tablet, Take 1 tablet by mouth Every 6 (Six) Hours As Needed (abd pain)., Disp: 20 tablet, Rfl: 0  •  ondansetron ODT (ZOFRAN-ODT) 4 MG disintegrating tablet, Take 1 tablet by mouth Every 6 (Six) Hours As Needed for Nausea., Disp: 20 tablet, Rfl: 0    ALLERGIES:    Allergies   Allergen Reactions   • Amoxicillin Anaphylaxis and Hives   • Penicillins Anaphylaxis and Hives   • Sulfa Antibiotics Anaphylaxis and Hives     REVIEW OF SYSTEMS:    A comprehensive 14 point review of systems was performed.  Significant findings as mentioned above.  All other systems reviewed and are negative.      Physical Exam   Vital Signs: /88   Pulse 93   Temp 97.8 °F (36.6 °C) (Oral)   Resp 18   Ht 162.6 cm (64\")   Wt 102 kg (225 lb 14.4 oz)   SpO2 98%   BMI 38.78 kg/m²    General: Well developed, well nourished, alert and oriented x 3, in no acute distress.   Head: ATNC   Eyes: PERRL, No evidence of conjunctivitis.   Nose: No nasal discharge.   Mouth: Oral mucosal membranes moist. No oral ulceration or hemorrhages.   Neck: Neck supple. No thyromegaly. No JVD.   Lungs: Clear in all fields to A&P without rales, rhonchi or wheezing.   Heart: S1, S2. Regular rate and rhythm. No murmurs, rubs, or gallops.   Abdomen: Soft. Bowel sounds are normoactive. Nontender with palpation.   Extremities: No cyanosis or edema.   Neurologic: Grossly non-focal exam    Pain Score:  Pain Score    01/13/20 0939   PainSc:   7     RECENT LABS:  Lab Results   Component Value Date    WBC 10.10 01/13/2020    HGB 15.8 01/13/2020    HCT 50.3 (H) 01/13/2020    MCV 91.0 01/13/2020    RDW 12.2 (L) 01/13/2020     01/13/2020    NEUTRORELPCT 69.8 01/13/2020    LYMPHORELPCT 23.2 01/13/2020    MONORELPCT 5.0 01/13/2020    EOSRELPCT 0.9 01/13/2020    BASORELPCT 0.4 01/13/2020    NEUTROABS 7.06 (H) 01/13/2020    LYMPHSABS " 2.34 01/13/2020             Lab Results   Component Value Date     (L) 01/13/2020    K 3.8 01/13/2020    CO2 17.7 (L) 01/13/2020     01/13/2020    BUN 10 01/13/2020    CREATININE 0.69 01/13/2020    EGFRIFNONA 94 01/13/2020    GLUCOSE 146 (H) 01/13/2020    CALCIUM 8.8 01/13/2020    ALKPHOS 88 01/13/2020    AST 16 01/13/2020    ALT 13 01/13/2020    BILITOT 0.4 01/13/2020    ALBUMIN 3.64 01/13/2020    PROTEINTOT 7.2 01/13/2020    MG 1.8 10/11/2019     ASSESSMENT & PLAN:  Sherrill Nathan is a very pleasant 39 y.o. female with    1.  Erythrocytosis:  -Patient established care with her PCP in July 2019 and has been following with her routinely since then. In July 2019, she had blood testing with PCP and noted normal Hg/Hct. Previous available CBCs reviewed. Her Hg/Hct remained normal until October 2019 and had mildly elevated Hct at that time (47.1). Most recent CBC done on 1/3/20  showed elevated Hg (17.2) and Hct (53.3). Ms. Nathan reports smoking since she was 11 years old but has intermittently quit and restarted several times.  Patient reports she restarted smoking in October 2019 and currently smokes 1/2-1 PPD. She reports history of GURPREET but has not used CPAP for the past couple of years as she used this in [place] prior to moving to [place].   -Given history above (chronic heavy smoking and GURPREET), this is more than likely secondary (environmental) rather than a primary (PCV/myeloproliferation), underlying etiology.   -However, will obtain additional labs today to further evaluate including CBC, CMP, PBS, Erythropoietin, [id number] and BCR/ABL.   -Possible polycythemia vera diagnosis as well as prognosis, and treatment were discussed with the patient. She understands that if she does have polycythemia vera she is at increased risk for thrombosis as well as bleeding, hematological malignancies and post-PV myelofibrosis. In the interim, discussed initiating phlebotomy for one unit (500 mL) to be taken every 2  weeks with goal of Hct  <52%.  If patient is positive for polycythemia vera will increase goals to Hct <45%.   -Repeat CBC from today showing improvement Hg (15.8) Hct (50.3). Therefore, will hold off on phlebotomy for now.   -Will have patient RTC in 2 weeks with CBC and prn phlebotomy to keep Hct <52 as above.   -Will follow up in 1 month with repeat labs and to discuss the above pending workup.   -In the meantime, recommended she cut back/quit smoking. Also discussed referral for sleep study/management of GURPREET and patient was agreeable. Will place referral today.    2. Folic acid deficiency:   -Started on folic acid 1 mg daily per PCP. Repeat folate from today pending.     ACO / ZEKE/Other  Quality measures  - Recommended 2019 flu vaccine.   -  Sherrill Nathan reports a pain score of 7.  Given her pain assessment as noted, treatment options were discussed and the following options were decided upon as a follow-up plan to address the patient's pain: referral to Primary Care for assistance in pain treatment guidance.  -  Current outpatient and discharge medications have been reconciled for the patient.  Reviewed by: BOLIVAR Tadeo    The patient was in agreement with the plan and all questions were answered to her satisfaction.     Thank you so much for allowing us to participate in the care of Sherrill Nathan . Please do not hesitate to contact us with any questions or concerns.     A total of 45 minutes were spent coordinating this patient’s care in clinic today; more than 50% of this time was face-to-face with the patient, reviewing her medical history and counseling on the current treatment and followup plan. All questions were answered to her satisfaction.      Electronically Signed by: BOLIVAR Tadeo , January 13, 2020 9:19 AM       CC:   BOLIVAR Negro Wendy

## 2020-01-14 LAB
CYTOLOGIST CVX/VAG CYTO: NORMAL
ETHNIC BACKGROUND STATED: 6.6 MIU/ML (ref 2.6–18.5)
PATH INTERP BLD-IMP: NORMAL

## 2020-01-17 LAB
JAK2 EXON 12 MUT TESTED BLD/T: NORMAL
JAK2 P.V617F BLD/T QL: NORMAL
LAB DIRECTOR NAME PROVIDER: NORMAL
REF LAB TEST METHOD: NORMAL
REFERENCES: NORMAL

## 2020-01-20 LAB
INTERPRETATION: NORMAL
LAB DIRECTOR NAME PROVIDER: NORMAL
LABORATORY COMMENT REPORT: NORMAL
Lab: NORMAL
T(ABL1,BCR)B2A2/CONTROL (IS) BLD/T: NORMAL %
T(ABL1,BCR)B3A2 MAR QL: NORMAL %
T(ABL1,BCR)E1A2/CONTROL BLD/T: NORMAL %

## 2020-01-27 ENCOUNTER — LAB (OUTPATIENT)
Dept: ONCOLOGY | Facility: CLINIC | Age: 40
End: 2020-01-27

## 2020-01-27 VITALS
RESPIRATION RATE: 18 BRPM | DIASTOLIC BLOOD PRESSURE: 76 MMHG | SYSTOLIC BLOOD PRESSURE: 108 MMHG | OXYGEN SATURATION: 96 % | HEART RATE: 87 BPM | TEMPERATURE: 98.1 F

## 2020-01-27 DIAGNOSIS — D75.1 ERYTHROCYTOSIS: ICD-10-CM

## 2020-01-27 LAB
BASOPHILS # BLD AUTO: 0.04 10*3/MM3 (ref 0–0.2)
BASOPHILS NFR BLD AUTO: 0.3 % (ref 0–1.5)
DEPRECATED RDW RBC AUTO: 41.9 FL (ref 37–54)
EOSINOPHIL # BLD AUTO: 0.13 10*3/MM3 (ref 0–0.4)
EOSINOPHIL NFR BLD AUTO: 1.1 % (ref 0.3–6.2)
ERYTHROCYTE [DISTWIDTH] IN BLOOD BY AUTOMATED COUNT: 12.1 % (ref 12.3–15.4)
HCT VFR BLD AUTO: 51.2 % (ref 34–46.6)
HGB BLD-MCNC: 16.1 G/DL (ref 12–15.9)
IMM GRANULOCYTES # BLD AUTO: 0.07 10*3/MM3 (ref 0–0.05)
IMM GRANULOCYTES NFR BLD AUTO: 0.6 % (ref 0–0.5)
LYMPHOCYTES # BLD AUTO: 3.45 10*3/MM3 (ref 0.7–3.1)
LYMPHOCYTES NFR BLD AUTO: 28.3 % (ref 19.6–45.3)
MCH RBC QN AUTO: 29.3 PG (ref 26.6–33)
MCHC RBC AUTO-ENTMCNC: 31.4 G/DL (ref 31.5–35.7)
MCV RBC AUTO: 93.1 FL (ref 79–97)
MONOCYTES # BLD AUTO: 0.61 10*3/MM3 (ref 0.1–0.9)
MONOCYTES NFR BLD AUTO: 5 % (ref 5–12)
NEUTROPHILS # BLD AUTO: 7.88 10*3/MM3 (ref 1.7–7)
NEUTROPHILS NFR BLD AUTO: 64.7 % (ref 42.7–76)
NRBC BLD AUTO-RTO: 0 /100 WBC (ref 0–0.2)
PLATELET # BLD AUTO: 303 10*3/MM3 (ref 140–450)
PMV BLD AUTO: 10.2 FL (ref 6–12)
RBC # BLD AUTO: 5.5 10*6/MM3 (ref 3.77–5.28)
WBC NRBC COR # BLD: 12.18 10*3/MM3 (ref 3.4–10.8)

## 2020-01-27 PROCEDURE — 85025 COMPLETE CBC W/AUTO DIFF WBC: CPT | Performed by: NURSE PRACTITIONER

## 2020-02-10 ENCOUNTER — LAB (OUTPATIENT)
Dept: ONCOLOGY | Facility: CLINIC | Age: 40
End: 2020-02-10

## 2020-02-10 ENCOUNTER — OFFICE VISIT (OUTPATIENT)
Dept: ONCOLOGY | Facility: CLINIC | Age: 40
End: 2020-02-10

## 2020-02-10 VITALS
DIASTOLIC BLOOD PRESSURE: 95 MMHG | TEMPERATURE: 98.5 F | RESPIRATION RATE: 18 BRPM | BODY MASS INDEX: 38.24 KG/M2 | OXYGEN SATURATION: 96 % | HEART RATE: 110 BPM | SYSTOLIC BLOOD PRESSURE: 137 MMHG | WEIGHT: 222.8 LBS

## 2020-02-10 DIAGNOSIS — D75.1 ERYTHROCYTOSIS: Primary | ICD-10-CM

## 2020-02-10 DIAGNOSIS — E53.8 FOLIC ACID DEFICIENCY: ICD-10-CM

## 2020-02-10 DIAGNOSIS — D72.829 LEUKOCYTOSIS, UNSPECIFIED TYPE: ICD-10-CM

## 2020-02-10 LAB
BASOPHILS # BLD AUTO: 0.05 10*3/MM3 (ref 0–0.2)
BASOPHILS NFR BLD AUTO: 0.4 % (ref 0–1.5)
DEPRECATED RDW RBC AUTO: 42.2 FL (ref 37–54)
EOSINOPHIL # BLD AUTO: 0.08 10*3/MM3 (ref 0–0.4)
EOSINOPHIL NFR BLD AUTO: 0.6 % (ref 0.3–6.2)
ERYTHROCYTE [DISTWIDTH] IN BLOOD BY AUTOMATED COUNT: 12.5 % (ref 12.3–15.4)
HCT VFR BLD AUTO: 52.6 % (ref 34–46.6)
HGB BLD-MCNC: 16.6 G/DL (ref 12–15.9)
IMM GRANULOCYTES # BLD AUTO: 0.08 10*3/MM3 (ref 0–0.05)
IMM GRANULOCYTES NFR BLD AUTO: 0.6 % (ref 0–0.5)
LYMPHOCYTES # BLD AUTO: 3.06 10*3/MM3 (ref 0.7–3.1)
LYMPHOCYTES NFR BLD AUTO: 24 % (ref 19.6–45.3)
Lab: NORMAL
MCH RBC QN AUTO: 29 PG (ref 26.6–33)
MCHC RBC AUTO-ENTMCNC: 31.6 G/DL (ref 31.5–35.7)
MCV RBC AUTO: 91.8 FL (ref 79–97)
MONOCYTES # BLD AUTO: 0.55 10*3/MM3 (ref 0.1–0.9)
MONOCYTES NFR BLD AUTO: 4.3 % (ref 5–12)
NEUTROPHILS # BLD AUTO: 8.95 10*3/MM3 (ref 1.7–7)
NEUTROPHILS NFR BLD AUTO: 70.1 % (ref 42.7–76)
NRBC BLD AUTO-RTO: 0 /100 WBC (ref 0–0.2)
PLATELET # BLD AUTO: 335 10*3/MM3 (ref 140–450)
PMV BLD AUTO: 10.4 FL (ref 6–12)
POST-BLOOD PRESSURE: NORMAL
PRE-BLOOD PRESSURE: NORMAL
PRE-HCT: 52.6
PRE-HGB: 16.6
PULSE: 110
RBC # BLD AUTO: 5.73 10*6/MM3 (ref 3.77–5.28)
VOLUME COLLECTED: 505
WBC NRBC COR # BLD: 12.77 10*3/MM3 (ref 3.4–10.8)

## 2020-02-10 PROCEDURE — 36415 COLL VENOUS BLD VENIPUNCTURE: CPT

## 2020-02-10 PROCEDURE — 99214 OFFICE O/P EST MOD 30 MIN: CPT | Performed by: NURSE PRACTITIONER

## 2020-02-10 PROCEDURE — 85025 COMPLETE CBC W/AUTO DIFF WBC: CPT | Performed by: NURSE PRACTITIONER

## 2020-02-10 PROCEDURE — 99195 PHLEBOTOMY: CPT | Performed by: NURSE PRACTITIONER

## 2020-02-10 PROCEDURE — 81403 MOPATH PROCEDURE LEVEL 4: CPT

## 2020-02-10 PROCEDURE — 81219 CALR GENE COM VARIANTS: CPT

## 2020-02-10 PROCEDURE — 81402 MOPATH PROCEDURE LEVEL 3: CPT

## 2020-02-10 RX ORDER — FOLIC ACID 1 MG/1
1 TABLET ORAL DAILY
Qty: 30 TABLET | Refills: 3 | Status: SHIPPED | OUTPATIENT
Start: 2020-02-10

## 2020-02-10 NOTE — PROGRESS NOTES
DATE:  2/10/2020    REASON FOR FOLLOW UP: Erythrocytosis    REFERRING PHYSICIAN:  BOLIVAR Negro    CHIEF COMPLAINT:  Follow up of erythrocytosis    HISTORY OF PRESENT ILLNESS:   Sherrill Nathan is a 39 y.o. female who is being seen today at the request of BOLIVAR Negro  for evaluation and treatment of erythrocytosis. Ms. Nathan reports establishing care with her PCP in July 2019 and has been following with her routinely since then. In July 2019, she had blood testing with PCP and noted normal Hg/Hct. Previous available CBCs reviewed. Her Hg/Hct remained normal until October 2019 and had mildly elevated Hct at that time (47.1). Most recent CBC done on 1/3/20  showed elevated Hg (17.2) and Hct (53.3). Ms. Nathan reports smoking since she was 11 years old but has intermittently quit and restarted several times.  Patient reports she restarted smoking in October 2019 and currently smokes 1/2-1 PPD. She reports history of GURPREET but has not used CPAP for the past couple of years as she used this in [place] prior to moving to [place]. She complains of chronic fatigue. She reports having migraines and occasional dizziness and takes medicine per PCP. She also complains of neuropathy in bilateral hands/feet (worse in right hand and left foot). She denies any other complaints today.     INTERVAL HISTORY:  Ms. Nathan presents today for follow up. Since her last visit, overall, she has been doing well. We have been checking her CBC every 2 weeks and has not required phlebotomy to date. She continues to smoke 1/2-1PPD. We referred her for a sleep study which she says was done on 1/27 but has not been notified of results. She complains of chronic fatigue. She also reports chronic, intermittent migraines,  neuropathy in bilateral hands/feet and aches/pains. She woke up with mild nausea this morning. She denies any other complaints today.     PAST MEDICAL HISTORY:  Past Medical History:   Diagnosis Date   • Arthritis    • Asthma    •  Migraines    • Sleep apnea        PAST SURGICAL HISTORY:  Past Surgical History:   Procedure Laterality Date   •  SECTION     • RECTAL SURGERY         FAMILY HISTORY:  Family History   Problem Relation Age of Onset   • Leukemia Maternal Grandmother    • Breast cancer Maternal Grandfather        SOCIAL HISTORY:  Social History     Socioeconomic History   • Marital status:      Spouse name: Not on file   • Number of children: Not on file   • Years of education: Not on file   • Highest education level: Not on file   Tobacco Use   • Smoking status: Current Every Day Smoker     Packs/day: 1.00     Types: Cigarettes   • Smokeless tobacco: Never Used   Substance and Sexual Activity   • Alcohol use: Yes     Comment: rarely   • Drug use: Never   • Sexual activity: Defer       MEDICATIONS:  The current medication list was reviewed in the EMR    Current Outpatient Medications:   •  dicyclomine (BENTYL) 20 MG tablet, Take 1 tablet by mouth Every 6 (Six) Hours As Needed (abd pain)., Disp: 20 tablet, Rfl: 0  •  diphenoxylate-atropine (LOMOTIL) 2.5-0.025 MG per tablet, Take 1 tablet by mouth 4 (Four) Times a Day As Needed for Diarrhea., Disp: , Rfl:   •  folic acid (FOLVITE) 1 MG tablet, Take 1 mg by mouth Daily., Disp: , Rfl:   •  meclizine 25 MG chewable tablet chewable tablet, Chew 25 mg 3 (Three) Times a Day As Needed., Disp: , Rfl:   •  nortriptyline (PAMELOR) 75 MG capsule, Take 75 mg by mouth Every Night., Disp: , Rfl:   •  ondansetron ODT (ZOFRAN-ODT) 4 MG disintegrating tablet, Take 1 tablet by mouth Every 6 (Six) Hours As Needed for Nausea., Disp: 20 tablet, Rfl: 0  •  tiZANidine (ZANAFLEX) 4 MG tablet, Take 4 mg by mouth 3 (Three) Times a Day., Disp: , Rfl:   •  vitamin D (ERGOCALCIFEROL) 1.25 MG (08781 UT) capsule capsule, Take 50,000 Units by mouth 1 (One) Time Per Week., Disp: , Rfl:   •  zolpidem (AMBIEN) 5 MG tablet, Take 5 mg by mouth At Night As Needed for Sleep., Disp: , Rfl:     ALLERGIES:     Allergies   Allergen Reactions   • Amoxicillin Anaphylaxis and Hives   • Penicillins Anaphylaxis and Hives   • Sulfa Antibiotics Anaphylaxis and Hives     REVIEW OF SYSTEMS:    A comprehensive 14 point review of systems was performed.  Significant findings as mentioned above.  All other systems reviewed and are negative.      Physical Exam   Vital Signs: /95   Pulse 110   Temp 98.5 °F (36.9 °C) (Oral)   Resp 18   Wt 101 kg (222 lb 12.8 oz)   SpO2 96%   BMI 38.24 kg/m²    General: Well developed, well nourished, alert and oriented x 3, in no acute distress.   Head: ATNC   Eyes: PERRL, No evidence of conjunctivitis.   Nose: No nasal discharge.   Mouth: Oral mucosal membranes moist. No oral ulceration or hemorrhages.   Neck: Neck supple. No thyromegaly. No JVD.   Lungs: Clear in all fields to A&P without rales, rhonchi or wheezing.   Heart: S1, S2. No murmurs, rubs, or gallops.   Abdomen: Soft. Bowel sounds are normoactive. Nontender with palpation.   Extremities: No cyanosis or edema.   Neurologic: Grossly non-focal exam    Pain Score:  Pain Score    02/10/20 0952   PainSc:   8   PainLoc: Back     RECENT LABS:  Lab Results   Component Value Date    WBC 12.77 (H) 02/10/2020    HGB 16.6 (H) 02/10/2020    HCT 52.6 (H) 02/10/2020    MCV 91.8 02/10/2020    RDW 12.5 02/10/2020     02/10/2020    NEUTRORELPCT 70.1 02/10/2020    LYMPHORELPCT 24.0 02/10/2020    MONORELPCT 4.3 (L) 02/10/2020    EOSRELPCT 0.6 02/10/2020    BASORELPCT 0.4 02/10/2020    NEUTROABS 8.95 (H) 02/10/2020    LYMPHSABS 3.06 02/10/2020     Lab Results   Component Value Date     (L) 01/13/2020    K 3.8 01/13/2020    CO2 17.7 (L) 01/13/2020     01/13/2020    BUN 10 01/13/2020    CREATININE 0.69 01/13/2020    EGFRIFNONA 94 01/13/2020    GLUCOSE 146 (H) 01/13/2020    CALCIUM 8.8 01/13/2020    ALKPHOS 88 01/13/2020    AST 16 01/13/2020    ALT 13 01/13/2020    BILITOT 0.4 01/13/2020    ALBUMIN 3.64 01/13/2020    PROTEINTOT 7.2  01/13/2020    MG 1.8 10/11/2019     Workup 1/13/20    Folate  4.78 - 24.20 ng/mL 9.63      Erythropoietin  2.6 - 18.5 mIU/mL 6.6      JAK2 V617 Mutation Qnt Result Comment    Comment: NEGATIVE      BCR/ABL: Negative      ASSESSMENT & PLAN:  Sherrill Nathan is a very pleasant 39 y.o. female with    1.  Erythrocytosis:  -Patient established care with her PCP in July 2019 and has been following with her routinely since then. In July 2019, she had blood testing with PCP and noted normal Hg/Hct. Previous available CBCs reviewed. Her Hg/Hct remained normal until October 2019 and had mildly elevated Hct at that time (47.1). Most recent CBC done on 1/3/20  showed elevated Hg (17.2) and Hct (53.3). Ms. Nathan reports smoking since she was 11 years old but has intermittently quit and restarted several times.  Patient reports she restarted smoking in October 2019 and currently smokes 1/2-1 PPD. She reports history of GURPREET but has not used CPAP for the past couple of years as she used this in [place] prior to moving to [place].   -Given history above (chronic heavy smoking and GURPREET), this is more than likely secondary (environmental) rather than a primary (PCV/myeloproliferation), underlying etiology.   -Possible polycythemia vera diagnosis as well as prognosis, and treatment were discussed with the patient. She understands that if she does have polycythemia vera she is at increased risk for thrombosis as well as bleeding, hematological malignancies and post-PV myelofibrosis. In the interim, discussed initiating phlebotomy for one unit (500 mL) to be taken every 2 weeks with goal of Hct  <52%.  If patient is positive for polycythemia vera will increase goals to Hct <45%.   -Obtained additional labs to further evaluate including PBS (as above), Erythropoietin was normal, [id number] and BCR/ABL were both negative.   - Repeat CBC from initial consultation showed improvement Hg (15.8) Hct (50.3).   -She has not required phlebotomy to date.  However, CBC from today showing Hct of 52.6. Therefore, will proceed with phlebotomy today.   -Also obtained JAK2 exon 12-15, CALR and MPL mutation today to further evaluate which is pending.   --We referred her for sleep study/management of GURPREET which was done on 1/27 in [place]. She says she is currently awaiting results. She was advised to call and follow up with them for further management. Will request records.   -For now, will continue to check CBC every 2 weeks with prn phlebotomy to keep Hct <52.   -Will follow up in 3 months with CXR prior ( for further secondary workup).   -In the meantime, recommended she cut back/quit smoking.    2. Folic acid deficiency:   -Started on folic acid 1 mg daily per PCP. Repeat folate replete.     3. Leukocytosis:  -Low grade. Likely reactive and secondary to smoking. Previously checked BCR/ABL for issue #1 which was negative. Will also check flow cytometry prior to next follow up. She has been advised to quit smoking.     ACO / ZEKE/Other  Quality measures  -  Sherrill Nathan did not receive 2019 flu vaccine. This was recommended.   -  Sherrill Nathan reports a pain score of 8.  Given her pain assessment as noted, treatment options were discussed and the following options were decided upon as a follow-up plan to address the patient's pain: referral to Primary Care for assistance in pain treatment guidance.  -  Current outpatient and discharge medications have been reconciled for the patient.  Reviewed by: BOLIVAR Tadeo    The patient was in agreement with the plan and all questions were answered to her satisfaction.     Thank you so much for allowing us to participate in the care of Sherrill Nathan . Please do not hesitate to contact us with any questions or concerns.     A total of 25 minutes were spent coordinating this patient’s care in clinic today; more than 50% of this time was face-to-face with the patient, reviewing her medical history and counseling on the current treatment  and followup plan. All questions were answered to her satisfaction.      Electronically Signed by: BOLIVAR Tadeo , February 10, 2020 9:53 AM       CC:   No ref. provider found  Priti Ley

## 2020-02-17 LAB
JAK2 EXONS 12-15 MUT DET PCR: NORMAL
LAB DIRECTOR NAME PROVIDER: NORMAL
LABORATORY COMMENT REPORT: NORMAL
Lab: NORMAL
METHOD: NORMAL

## 2020-02-18 LAB
CALR EXON 9 MUT ANL BLD/T: NORMAL
LAB DIRECTOR NAME PROVIDER: NORMAL
LAB DIRECTOR NAME PROVIDER: NORMAL
Lab: NORMAL
Lab: NORMAL
MPL MUTATION ANALYSIS RESULT:: NORMAL
REF LAB TEST METHOD: NORMAL
REF LAB TEST METHOD: NORMAL
REFERENCE: NORMAL
SERVICE CMNT-IMP: NORMAL

## 2020-02-24 ENCOUNTER — LAB (OUTPATIENT)
Dept: ONCOLOGY | Facility: CLINIC | Age: 40
End: 2020-02-24

## 2020-02-24 VITALS
DIASTOLIC BLOOD PRESSURE: 80 MMHG | TEMPERATURE: 98 F | HEART RATE: 104 BPM | OXYGEN SATURATION: 96 % | SYSTOLIC BLOOD PRESSURE: 111 MMHG | RESPIRATION RATE: 18 BRPM

## 2020-02-24 DIAGNOSIS — D72.829 LEUKOCYTOSIS, UNSPECIFIED TYPE: ICD-10-CM

## 2020-02-24 DIAGNOSIS — D75.1 ERYTHROCYTOSIS: ICD-10-CM

## 2020-02-24 LAB
BASOPHILS # BLD AUTO: 0.04 10*3/MM3 (ref 0–0.2)
BASOPHILS NFR BLD AUTO: 0.4 % (ref 0–1.5)
DEPRECATED RDW RBC AUTO: 42 FL (ref 37–54)
EOSINOPHIL # BLD AUTO: 0.1 10*3/MM3 (ref 0–0.4)
EOSINOPHIL NFR BLD AUTO: 1 % (ref 0.3–6.2)
ERYTHROCYTE [DISTWIDTH] IN BLOOD BY AUTOMATED COUNT: 12.4 % (ref 12.3–15.4)
HCT VFR BLD AUTO: 45.9 % (ref 34–46.6)
HGB BLD-MCNC: 14.6 G/DL (ref 12–15.9)
IMM GRANULOCYTES # BLD AUTO: 0.04 10*3/MM3 (ref 0–0.05)
IMM GRANULOCYTES NFR BLD AUTO: 0.4 % (ref 0–0.5)
LYMPHOCYTES # BLD AUTO: 3.21 10*3/MM3 (ref 0.7–3.1)
LYMPHOCYTES NFR BLD AUTO: 32 % (ref 19.6–45.3)
MCH RBC QN AUTO: 29.4 PG (ref 26.6–33)
MCHC RBC AUTO-ENTMCNC: 31.8 G/DL (ref 31.5–35.7)
MCV RBC AUTO: 92.4 FL (ref 79–97)
MONOCYTES # BLD AUTO: 0.44 10*3/MM3 (ref 0.1–0.9)
MONOCYTES NFR BLD AUTO: 4.4 % (ref 5–12)
NEUTROPHILS # BLD AUTO: 6.2 10*3/MM3 (ref 1.7–7)
NEUTROPHILS NFR BLD AUTO: 61.8 % (ref 42.7–76)
NRBC BLD AUTO-RTO: 0 /100 WBC (ref 0–0.2)
PLATELET # BLD AUTO: 316 10*3/MM3 (ref 140–450)
PMV BLD AUTO: 10.1 FL (ref 6–12)
RBC # BLD AUTO: 4.97 10*6/MM3 (ref 3.77–5.28)
WBC NRBC COR # BLD: 10.03 10*3/MM3 (ref 3.4–10.8)

## 2020-02-24 PROCEDURE — 85025 COMPLETE CBC W/AUTO DIFF WBC: CPT | Performed by: NURSE PRACTITIONER

## 2020-02-26 LAB — LEUK/LYMPH PHENO: NORMAL

## 2020-03-16 ENCOUNTER — LAB (OUTPATIENT)
Dept: ONCOLOGY | Facility: CLINIC | Age: 40
End: 2020-03-16

## 2020-03-16 VITALS
SYSTOLIC BLOOD PRESSURE: 117 MMHG | RESPIRATION RATE: 18 BRPM | DIASTOLIC BLOOD PRESSURE: 79 MMHG | OXYGEN SATURATION: 98 % | TEMPERATURE: 97.9 F | HEART RATE: 86 BPM

## 2020-03-16 DIAGNOSIS — D75.1 ERYTHROCYTOSIS: ICD-10-CM

## 2020-03-16 LAB
BASOPHILS # BLD AUTO: 0.05 10*3/MM3 (ref 0–0.2)
BASOPHILS NFR BLD AUTO: 0.4 % (ref 0–1.5)
DEPRECATED RDW RBC AUTO: 40.9 FL (ref 37–54)
EOSINOPHIL # BLD AUTO: 0.19 10*3/MM3 (ref 0–0.4)
EOSINOPHIL NFR BLD AUTO: 1.7 % (ref 0.3–6.2)
ERYTHROCYTE [DISTWIDTH] IN BLOOD BY AUTOMATED COUNT: 12.3 % (ref 12.3–15.4)
HCT VFR BLD AUTO: 46.2 % (ref 34–46.6)
HGB BLD-MCNC: 14.6 G/DL (ref 12–15.9)
IMM GRANULOCYTES # BLD AUTO: 0.08 10*3/MM3 (ref 0–0.05)
IMM GRANULOCYTES NFR BLD AUTO: 0.7 % (ref 0–0.5)
LYMPHOCYTES # BLD AUTO: 2.77 10*3/MM3 (ref 0.7–3.1)
LYMPHOCYTES NFR BLD AUTO: 24.3 % (ref 19.6–45.3)
MCH RBC QN AUTO: 28.6 PG (ref 26.6–33)
MCHC RBC AUTO-ENTMCNC: 31.6 G/DL (ref 31.5–35.7)
MCV RBC AUTO: 90.6 FL (ref 79–97)
MONOCYTES # BLD AUTO: 0.55 10*3/MM3 (ref 0.1–0.9)
MONOCYTES NFR BLD AUTO: 4.8 % (ref 5–12)
NEUTROPHILS # BLD AUTO: 7.75 10*3/MM3 (ref 1.7–7)
NEUTROPHILS NFR BLD AUTO: 68.1 % (ref 42.7–76)
NRBC BLD AUTO-RTO: 0 /100 WBC (ref 0–0.2)
PLATELET # BLD AUTO: 368 10*3/MM3 (ref 140–450)
PMV BLD AUTO: 9.6 FL (ref 6–12)
RBC # BLD AUTO: 5.1 10*6/MM3 (ref 3.77–5.28)
WBC NRBC COR # BLD: 11.39 10*3/MM3 (ref 3.4–10.8)

## 2020-03-16 PROCEDURE — 85025 COMPLETE CBC W/AUTO DIFF WBC: CPT | Performed by: NURSE PRACTITIONER

## 2020-03-30 ENCOUNTER — LAB (OUTPATIENT)
Dept: ONCOLOGY | Facility: CLINIC | Age: 40
End: 2020-03-30

## 2020-03-30 VITALS
SYSTOLIC BLOOD PRESSURE: 135 MMHG | RESPIRATION RATE: 18 BRPM | TEMPERATURE: 98.1 F | DIASTOLIC BLOOD PRESSURE: 100 MMHG | OXYGEN SATURATION: 98 % | HEART RATE: 131 BPM

## 2020-03-30 DIAGNOSIS — D75.1 ERYTHROCYTOSIS: ICD-10-CM

## 2020-03-30 LAB
BASOPHILS # BLD AUTO: 0.08 10*3/MM3 (ref 0–0.2)
BASOPHILS NFR BLD AUTO: 0.6 % (ref 0–1.5)
DEPRECATED RDW RBC AUTO: 42 FL (ref 37–54)
EOSINOPHIL # BLD AUTO: 0.06 10*3/MM3 (ref 0–0.4)
EOSINOPHIL NFR BLD AUTO: 0.5 % (ref 0.3–6.2)
ERYTHROCYTE [DISTWIDTH] IN BLOOD BY AUTOMATED COUNT: 12.3 % (ref 12.3–15.4)
HCT VFR BLD AUTO: 51.9 % (ref 34–46.6)
HGB BLD-MCNC: 16.3 G/DL (ref 12–15.9)
IMM GRANULOCYTES # BLD AUTO: 0.07 10*3/MM3 (ref 0–0.05)
IMM GRANULOCYTES NFR BLD AUTO: 0.6 % (ref 0–0.5)
LYMPHOCYTES # BLD AUTO: 3.49 10*3/MM3 (ref 0.7–3.1)
LYMPHOCYTES NFR BLD AUTO: 27.8 % (ref 19.6–45.3)
Lab: NORMAL
MCH RBC QN AUTO: 29.1 PG (ref 26.6–33)
MCHC RBC AUTO-ENTMCNC: 31.4 G/DL (ref 31.5–35.7)
MCV RBC AUTO: 92.7 FL (ref 79–97)
MONOCYTES # BLD AUTO: 0.56 10*3/MM3 (ref 0.1–0.9)
MONOCYTES NFR BLD AUTO: 4.5 % (ref 5–12)
NEUTROPHILS # BLD AUTO: 8.28 10*3/MM3 (ref 1.7–7)
NEUTROPHILS NFR BLD AUTO: 66 % (ref 42.7–76)
NRBC BLD AUTO-RTO: 0 /100 WBC (ref 0–0.2)
PLATELET # BLD AUTO: 349 10*3/MM3 (ref 140–450)
PMV BLD AUTO: 10 FL (ref 6–12)
POST-BLOOD PRESSURE: NORMAL
PRE-BLOOD PRESSURE: NORMAL
PRE-HCT: 51.9
PRE-HGB: 16.3
PULSE: 131
RBC # BLD AUTO: 5.6 10*6/MM3 (ref 3.77–5.28)
VOLUME COLLECTED: 510
WBC NRBC COR # BLD: 12.54 10*3/MM3 (ref 3.4–10.8)

## 2020-03-30 PROCEDURE — 36415 COLL VENOUS BLD VENIPUNCTURE: CPT

## 2020-03-30 PROCEDURE — 99195 PHLEBOTOMY: CPT | Performed by: NURSE PRACTITIONER

## 2020-03-30 PROCEDURE — 85025 COMPLETE CBC W/AUTO DIFF WBC: CPT | Performed by: NURSE PRACTITIONER

## 2020-04-13 ENCOUNTER — LAB (OUTPATIENT)
Dept: ONCOLOGY | Facility: CLINIC | Age: 40
End: 2020-04-13

## 2020-04-13 DIAGNOSIS — D75.1 ERYTHROCYTOSIS: ICD-10-CM

## 2020-04-13 LAB
BASOPHILS # BLD AUTO: 0.05 10*3/MM3 (ref 0–0.2)
BASOPHILS NFR BLD AUTO: 0.4 % (ref 0–1.5)
DEPRECATED RDW RBC AUTO: 41.2 FL (ref 37–54)
EOSINOPHIL # BLD AUTO: 0.03 10*3/MM3 (ref 0–0.4)
EOSINOPHIL NFR BLD AUTO: 0.2 % (ref 0.3–6.2)
ERYTHROCYTE [DISTWIDTH] IN BLOOD BY AUTOMATED COUNT: 12.1 % (ref 12.3–15.4)
HCT VFR BLD AUTO: 46.9 % (ref 34–46.6)
HGB BLD-MCNC: 14.7 G/DL (ref 12–15.9)
IMM GRANULOCYTES # BLD AUTO: 0.07 10*3/MM3 (ref 0–0.05)
IMM GRANULOCYTES NFR BLD AUTO: 0.5 % (ref 0–0.5)
LYMPHOCYTES # BLD AUTO: 3.39 10*3/MM3 (ref 0.7–3.1)
LYMPHOCYTES NFR BLD AUTO: 23.8 % (ref 19.6–45.3)
MCH RBC QN AUTO: 29 PG (ref 26.6–33)
MCHC RBC AUTO-ENTMCNC: 31.3 G/DL (ref 31.5–35.7)
MCV RBC AUTO: 92.5 FL (ref 79–97)
MONOCYTES # BLD AUTO: 0.81 10*3/MM3 (ref 0.1–0.9)
MONOCYTES NFR BLD AUTO: 5.7 % (ref 5–12)
NEUTROPHILS # BLD AUTO: 9.89 10*3/MM3 (ref 1.7–7)
NEUTROPHILS NFR BLD AUTO: 69.4 % (ref 42.7–76)
NRBC BLD AUTO-RTO: 0 /100 WBC (ref 0–0.2)
PLATELET # BLD AUTO: 420 10*3/MM3 (ref 140–450)
PMV BLD AUTO: 9.8 FL (ref 6–12)
RBC # BLD AUTO: 5.07 10*6/MM3 (ref 3.77–5.28)
WBC NRBC COR # BLD: 14.24 10*3/MM3 (ref 3.4–10.8)

## 2020-04-13 PROCEDURE — 85025 COMPLETE CBC W/AUTO DIFF WBC: CPT | Performed by: NURSE PRACTITIONER

## 2020-04-28 ENCOUNTER — LAB (OUTPATIENT)
Dept: ONCOLOGY | Facility: CLINIC | Age: 40
End: 2020-04-28

## 2020-04-28 VITALS
HEART RATE: 96 BPM | RESPIRATION RATE: 18 BRPM | DIASTOLIC BLOOD PRESSURE: 91 MMHG | SYSTOLIC BLOOD PRESSURE: 124 MMHG | OXYGEN SATURATION: 98 % | TEMPERATURE: 98.5 F

## 2020-04-28 DIAGNOSIS — D75.1 ERYTHROCYTOSIS: ICD-10-CM

## 2020-04-28 LAB
BASOPHILS # BLD AUTO: 0.04 10*3/MM3 (ref 0–0.2)
BASOPHILS NFR BLD AUTO: 0.4 % (ref 0–1.5)
DEPRECATED RDW RBC AUTO: 40.6 FL (ref 37–54)
EOSINOPHIL # BLD AUTO: 0.08 10*3/MM3 (ref 0–0.4)
EOSINOPHIL NFR BLD AUTO: 0.8 % (ref 0.3–6.2)
ERYTHROCYTE [DISTWIDTH] IN BLOOD BY AUTOMATED COUNT: 11.9 % (ref 12.3–15.4)
HCT VFR BLD AUTO: 44.8 % (ref 34–46.6)
HGB BLD-MCNC: 13.9 G/DL (ref 12–15.9)
IMM GRANULOCYTES # BLD AUTO: 0.05 10*3/MM3 (ref 0–0.05)
IMM GRANULOCYTES NFR BLD AUTO: 0.5 % (ref 0–0.5)
LYMPHOCYTES # BLD AUTO: 3.28 10*3/MM3 (ref 0.7–3.1)
LYMPHOCYTES NFR BLD AUTO: 31.6 % (ref 19.6–45.3)
MCH RBC QN AUTO: 28.7 PG (ref 26.6–33)
MCHC RBC AUTO-ENTMCNC: 31 G/DL (ref 31.5–35.7)
MCV RBC AUTO: 92.6 FL (ref 79–97)
MONOCYTES # BLD AUTO: 0.62 10*3/MM3 (ref 0.1–0.9)
MONOCYTES NFR BLD AUTO: 6 % (ref 5–12)
NEUTROPHILS # BLD AUTO: 6.31 10*3/MM3 (ref 1.7–7)
NEUTROPHILS NFR BLD AUTO: 60.7 % (ref 42.7–76)
NRBC BLD AUTO-RTO: 0 /100 WBC (ref 0–0.2)
PLATELET # BLD AUTO: 353 10*3/MM3 (ref 140–450)
PMV BLD AUTO: 10.1 FL (ref 6–12)
RBC # BLD AUTO: 4.84 10*6/MM3 (ref 3.77–5.28)
WBC NRBC COR # BLD: 10.38 10*3/MM3 (ref 3.4–10.8)

## 2020-04-28 PROCEDURE — 85025 COMPLETE CBC W/AUTO DIFF WBC: CPT | Performed by: NURSE PRACTITIONER

## 2020-05-11 ENCOUNTER — OFFICE VISIT (OUTPATIENT)
Dept: ONCOLOGY | Facility: CLINIC | Age: 40
End: 2020-05-11

## 2020-05-11 ENCOUNTER — LAB (OUTPATIENT)
Dept: ONCOLOGY | Facility: CLINIC | Age: 40
End: 2020-05-11

## 2020-05-11 VITALS
RESPIRATION RATE: 18 BRPM | WEIGHT: 229.4 LBS | TEMPERATURE: 96.9 F | BODY MASS INDEX: 39.38 KG/M2 | SYSTOLIC BLOOD PRESSURE: 115 MMHG | HEART RATE: 117 BPM | DIASTOLIC BLOOD PRESSURE: 80 MMHG | OXYGEN SATURATION: 99 %

## 2020-05-11 DIAGNOSIS — D75.1 ERYTHROCYTOSIS: Primary | ICD-10-CM

## 2020-05-11 DIAGNOSIS — D75.1 ERYTHROCYTOSIS: ICD-10-CM

## 2020-05-11 LAB
BASOPHILS # BLD AUTO: 0.04 10*3/MM3 (ref 0–0.2)
BASOPHILS NFR BLD AUTO: 0.4 % (ref 0–1.5)
DEPRECATED RDW RBC AUTO: 39.2 FL (ref 37–54)
EOSINOPHIL # BLD AUTO: 0.13 10*3/MM3 (ref 0–0.4)
EOSINOPHIL NFR BLD AUTO: 1.3 % (ref 0.3–6.2)
ERYTHROCYTE [DISTWIDTH] IN BLOOD BY AUTOMATED COUNT: 11.6 % (ref 12.3–15.4)
HCT VFR BLD AUTO: 44.5 % (ref 34–46.6)
HGB BLD-MCNC: 13.8 G/DL (ref 12–15.9)
IMM GRANULOCYTES # BLD AUTO: 0.04 10*3/MM3 (ref 0–0.05)
IMM GRANULOCYTES NFR BLD AUTO: 0.4 % (ref 0–0.5)
LYMPHOCYTES # BLD AUTO: 5.19 10*3/MM3 (ref 0.7–3.1)
LYMPHOCYTES NFR BLD AUTO: 50 % (ref 19.6–45.3)
MCH RBC QN AUTO: 28.6 PG (ref 26.6–33)
MCHC RBC AUTO-ENTMCNC: 31 G/DL (ref 31.5–35.7)
MCV RBC AUTO: 92.3 FL (ref 79–97)
MONOCYTES # BLD AUTO: 0.52 10*3/MM3 (ref 0.1–0.9)
MONOCYTES NFR BLD AUTO: 5 % (ref 5–12)
NEUTROPHILS # BLD AUTO: 4.46 10*3/MM3 (ref 1.7–7)
NEUTROPHILS NFR BLD AUTO: 42.9 % (ref 42.7–76)
NRBC BLD AUTO-RTO: 0 /100 WBC (ref 0–0.2)
PLATELET # BLD AUTO: 380 10*3/MM3 (ref 140–450)
PMV BLD AUTO: 9.5 FL (ref 6–12)
RBC # BLD AUTO: 4.82 10*6/MM3 (ref 3.77–5.28)
WBC NRBC COR # BLD: 10.38 10*3/MM3 (ref 3.4–10.8)

## 2020-05-11 PROCEDURE — 85025 COMPLETE CBC W/AUTO DIFF WBC: CPT | Performed by: NURSE PRACTITIONER

## 2020-05-11 PROCEDURE — 99214 OFFICE O/P EST MOD 30 MIN: CPT | Performed by: NURSE PRACTITIONER

## 2020-05-11 RX ORDER — FLUOXETINE 10 MG/1
10 CAPSULE ORAL DAILY
COMMUNITY
End: 2023-01-17 | Stop reason: ALTCHOICE

## 2020-05-11 RX ORDER — HYDROXYZINE PAMOATE 25 MG/1
25 CAPSULE ORAL 3 TIMES DAILY PRN
COMMUNITY
End: 2023-01-17

## 2020-05-11 RX ORDER — LAMOTRIGINE 25 MG/1
TABLET ORAL
COMMUNITY
End: 2023-01-17

## 2020-05-11 RX ORDER — PRAZOSIN HYDROCHLORIDE 1 MG/1
CAPSULE ORAL
COMMUNITY
Start: 2020-04-27 | End: 2022-12-15

## 2020-05-11 NOTE — PROGRESS NOTES
DATE:  5/11/2020    REASON FOR FOLLOW UP: Erythrocytosis    REFERRING PHYSICIAN:  BOLIVAR Negro    CHIEF COMPLAINT:  Follow up of erythrocytosis    HISTORY OF PRESENT ILLNESS:   Sherrill Nathan is a 40 y.o. female who is being seen today at the request of BOLIVAR Negro  for evaluation and treatment of erythrocytosis. Ms. Nathan reports establishing care with her PCP in July 2019 and has been following with her routinely since then. In July 2019, she had blood testing with PCP and noted normal Hg/Hct. Previous available CBCs reviewed. Her Hg/Hct remained normal until October 2019 and had mildly elevated Hct at that time (47.1). Most recent CBC done on 1/3/20  showed elevated Hg (17.2) and Hct (53.3). Ms. Nathan reports smoking since she was 11 years old but has intermittently quit and restarted several times.  Patient reports she restarted smoking in October 2019 and currently smokes 1/2-1 PPD. She reports history of GURPREET but has not used CPAP for the past couple of years as she used this in [place] prior to moving to [place]. She complains of chronic fatigue. She reports having migraines and occasional dizziness and takes medicine per PCP. She also complains of neuropathy in bilateral hands/feet (worse in right hand and left foot). She denies any other complaints today.     INTERVAL HISTORY:  Ms. Nathan presents today for follow up of erythrocytosis. We have been checking her CBC every 2 weeks and she last required a phlebotomy on 03/30/20. She also reports that she quit smoking in March. Sleep study was performed on 01/27/20 and she states that she has yet to get her Auto PAP equipment for GURPREET. She continues to complain of chronic fatigue and chronic aches/pains from herniated discs/arthritis. She follows with [facility] for pain control. She denies any other complaints today.      PAST MEDICAL HISTORY:  Past Medical History:   Diagnosis Date   • Arthritis    • Asthma    • Migraines    • Sleep  apnea        PAST SURGICAL HISTORY:  Past Surgical History:   Procedure Laterality Date   •  SECTION     • RECTAL SURGERY         FAMILY HISTORY:  Family History   Problem Relation Age of Onset   • Leukemia Maternal Grandmother    • Breast cancer Maternal Grandfather        SOCIAL HISTORY:  Social History     Socioeconomic History   • Marital status:      Spouse name: Not on file   • Number of children: Not on file   • Years of education: Not on file   • Highest education level: Not on file   Tobacco Use   • Smoking status: Current Every Day Smoker     Packs/day: 1.00     Types: Cigarettes   • Smokeless tobacco: Never Used   Substance and Sexual Activity   • Alcohol use: Yes     Comment: rarely   • Drug use: Never   • Sexual activity: Defer       MEDICATIONS:  The current medication list was reviewed in the EMR    Current Outpatient Medications:   •  dicyclomine (BENTYL) 20 MG tablet, Take 1 tablet by mouth Every 6 (Six) Hours As Needed (abd pain)., Disp: 20 tablet, Rfl: 0  •  diphenoxylate-atropine (LOMOTIL) 2.5-0.025 MG per tablet, Take 1 tablet by mouth 4 (Four) Times a Day As Needed for Diarrhea., Disp: , Rfl:   •  FLUoxetine (PROzac) 10 MG capsule, Take 10 mg by mouth Daily., Disp: , Rfl:   •  folic acid (FOLVITE) 1 MG tablet, Take 1 tablet by mouth Daily., Disp: 30 tablet, Rfl: 3  •  hydrOXYzine pamoate (VISTARIL) 25 MG capsule, Take 25 mg by mouth 3 (Three) Times a Day As Needed for Itching., Disp: , Rfl:   •  lamoTRIgine (LaMICtal) 25 MG tablet, lamotrigine 25 mg tablet, Disp: , Rfl:   •  meclizine 25 MG chewable tablet chewable tablet, Chew 25 mg 3 (Three) Times a Day As Needed., Disp: , Rfl:   •  nortriptyline (PAMELOR) 75 MG capsule, Take 75 mg by mouth Every Night., Disp: , Rfl:   •  ondansetron ODT (ZOFRAN-ODT) 4 MG disintegrating tablet, Take 1 tablet by mouth Every 6 (Six) Hours As Needed for Nausea., Disp: 20 tablet, Rfl: 0  •  prazosin (MINIPRESS) 1 MG capsule, , Disp: , Rfl:   •   tiZANidine (ZANAFLEX) 4 MG tablet, Take 4 mg by mouth 3 (Three) Times a Day., Disp: , Rfl:   •  vitamin D (ERGOCALCIFEROL) 1.25 MG (32101 UT) capsule capsule, Take 50,000 Units by mouth 1 (One) Time Per Week., Disp: , Rfl:   •  zolpidem (AMBIEN) 5 MG tablet, Take 5 mg by mouth At Night As Needed for Sleep., Disp: , Rfl:     ALLERGIES:    Allergies   Allergen Reactions   • Amoxicillin Anaphylaxis and Hives   • Penicillins Anaphylaxis and Hives   • Sulfa Antibiotics Anaphylaxis and Hives     REVIEW OF SYSTEMS:    A comprehensive 14 point review of systems was performed.  Significant findings as mentioned above.  All other systems reviewed and are negative.      Physical Exam   Vital Signs: /80   Pulse 117   Temp 96.9 °F (36.1 °C) (Oral)   Resp 18   Wt 104 kg (229 lb 6.4 oz)   SpO2 99%   BMI 39.38 kg/m²    General: Well developed, well nourished, alert and oriented x 3, in no acute distress.   Head: ATNC   Eyes: PERRL, No evidence of conjunctivitis.   Nose: No nasal discharge.   Mouth: Oral mucosal membranes moist. No oral ulceration or hemorrhages.   Neck: Neck supple. No thyromegaly. No JVD.   Lungs: Clear in all fields to A&P without rales, rhonchi or wheezing.   Heart: S1, S2. No murmurs, rubs, or gallops.   Abdomen: Soft. Bowel sounds are normoactive. Nontender with palpation.   Extremities: No cyanosis or edema.   Neurologic: Grossly non-focal exam    Pain Score:  Pain Score    05/11/20 0957   PainSc:   8   PainLoc: Back     RECENT LABS:  Lab Results   Component Value Date    WBC 10.38 05/11/2020    HGB 13.8 05/11/2020    HCT 44.5 05/11/2020    MCV 92.3 05/11/2020    RDW 11.6 (L) 05/11/2020     05/11/2020    NEUTRORELPCT 42.9 05/11/2020    LYMPHORELPCT 50.0 (H) 05/11/2020    MONORELPCT 5.0 05/11/2020    EOSRELPCT 1.3 05/11/2020    BASORELPCT 0.4 05/11/2020    NEUTROABS 4.46 05/11/2020    LYMPHSABS 5.19 (H) 05/11/2020     Lab Results   Component Value Date     (L) 01/13/2020    K 3.8  01/13/2020    CO2 17.7 (L) 01/13/2020     01/13/2020    BUN 10 01/13/2020    CREATININE 0.69 01/13/2020    EGFRIFNONA 94 01/13/2020    GLUCOSE 146 (H) 01/13/2020    CALCIUM 8.8 01/13/2020    ALKPHOS 88 01/13/2020    AST 16 01/13/2020    ALT 13 01/13/2020    BILITOT 0.4 01/13/2020    ALBUMIN 3.64 01/13/2020    PROTEINTOT 7.2 01/13/2020    MG 1.8 10/11/2019     Workup 1/13/20    Folate  4.78 - 24.20 ng/mL 9.63      Erythropoietin  2.6 - 18.5 mIU/mL 6.6      JAK2 V617 Mutation Qnt Result Comment    Comment: NEGATIVE      BCR/ABL: Negative    Workup 02/10/20  JAK2 Exons 12-15 Mut Det PCR Comment    Comment: NEGATIVE      CALR Mutation Detection Result Comment    Comment: NEGATIVE     MPL Mutation Analysis Result: Comment    Comment: No MPL mutation was identified in the provided specimen of this   individual.     Flow Cytometry 02/24/20        ASSESSMENT & PLAN:  Sherrill Nathan is a very pleasant 40 y.o. female with    1.  Erythrocytosis:  -Patient established care with her PCP in July 2019 and has been following with her routinely since then. In July 2019, she had blood testing with PCP and noted normal Hg/Hct. Previous available CBCs reviewed. Her Hg/Hct remained normal until October 2019 and had mildly elevated Hct at that time (47.1). Most recent CBC done on 1/3/20  showed elevated Hg (17.2) and Hct (53.3). Ms. Nathan reports smoking since she was 11 years old but has intermittently quit and restarted several times.  Patient reports she restarted smoking in October 2019 and currently smokes 1/2-1 PPD. She reports history of GURPREET but has not used CPAP for the past couple of years as she used this in [place] prior to moving to [place].   -Given history above (chronic heavy smoking and GURPREET), this is more than likely secondary (environmental) rather than a primary (PCV/myeloproliferation), underlying etiology.   -Possible polycythemia vera diagnosis as well as prognosis, and treatment were discussed with the patient.  She understands that if she does have polycythemia vera she is at increased risk for thrombosis as well as bleeding, hematological malignancies and post-PV myelofibrosis. In the interim, discussed initiating phlebotomy for one unit (500 mL) to be taken every 2 weeks with goal of Hct  <52%.  If patient is positive for polycythemia vera will increase goals to Hct <45%.   -Obtained additional labs to further evaluate including PBS (as above), Erythropoietin was normal, [id number] and BCR/ABL were both negative.   - Repeat CBC from initial consultation showed improvement Hg (15.8) Hct (50.3).   - Also obtained JAK2 exon 12-15, CALR and MPL mutation which were negative.   - We referred her for sleep study/management of GURPREET which was done on 1/27 in [place]. Sleep study consistent with obstructive sleep apnea. She has appointment with Dr. Cornelius on June 8, 2020 for further evaluation and to discuss plan of care.    - Hct today 44.5, not requiring phlebotomy. Will check CBC every 4 weeks with prn phlebotomy to keep Hct <52.   - Will follow up in 3 months with CBC. Patient has not had chest x-ray (for further secondary workup) done at this time, she will plan to have done prior to follow up in 3 months.    2. Folic acid deficiency:   -Currently on folic acid 1 mg daily per PCP. Repeat folate replete.     3. Leukocytosis:  - Low grade. Likely reactive and secondary to smoking. Previously checked BCR/ABL for issue #1 which was negative. Flow cytometry unremarkable (as above).   - WBC ~10.38, normal today. Likely improved since patient has quit smoking. Will monitor.    ACO / ZEKE/Other  Quality measures  -  Sherrill Nathan did not receive 2019 flu vaccine. This was recommended.  -  Sherrill Nathan reports a pain score of 8. Given her pain assessment as noted, treatment options were discussed and the following options were decided upon as a follow-up plan to address the patient's pain: patient currently follow with [facility] in [place], [place].  -  Current outpatient and discharge medications have been reconciled for the patient.  Reviewed by: BOLIVAR Pearson          The patient was in agreement with the plan and all questions were answered to her satisfaction.     Thank you so much for allowing us to participate in the care of Sherrill Nathan . Please do not hesitate to contact us with any questions or concerns.     A total of 25 minutes were spent coordinating this patient’s care in clinic today; more than 50% of this time was face-to-face with the patient, reviewing her medical history and counseling on the current treatment and followup plan. All questions were answered to her satisfaction.      Electronically Signed by: BOLIVAR Perry , May 11, 2020 10:55       CC:   Priti Ley

## 2020-06-16 ENCOUNTER — LAB (OUTPATIENT)
Dept: ONCOLOGY | Facility: CLINIC | Age: 40
End: 2020-06-16

## 2020-06-16 VITALS
DIASTOLIC BLOOD PRESSURE: 87 MMHG | SYSTOLIC BLOOD PRESSURE: 122 MMHG | HEART RATE: 97 BPM | OXYGEN SATURATION: 98 % | TEMPERATURE: 97.5 F | RESPIRATION RATE: 18 BRPM

## 2020-06-16 DIAGNOSIS — D75.1 ERYTHROCYTOSIS: ICD-10-CM

## 2020-06-16 LAB
BASOPHILS # BLD AUTO: 0.03 10*3/MM3 (ref 0–0.2)
BASOPHILS NFR BLD AUTO: 0.3 % (ref 0–1.5)
DEPRECATED RDW RBC AUTO: 44.8 FL (ref 37–54)
EOSINOPHIL # BLD AUTO: 0.03 10*3/MM3 (ref 0–0.4)
EOSINOPHIL NFR BLD AUTO: 0.3 % (ref 0.3–6.2)
ERYTHROCYTE [DISTWIDTH] IN BLOOD BY AUTOMATED COUNT: 13.1 % (ref 12.3–15.4)
HCT VFR BLD AUTO: 41.9 % (ref 34–46.6)
HGB BLD-MCNC: 13 G/DL (ref 12–15.9)
IMM GRANULOCYTES # BLD AUTO: 0.05 10*3/MM3 (ref 0–0.05)
IMM GRANULOCYTES NFR BLD AUTO: 0.5 % (ref 0–0.5)
LYMPHOCYTES # BLD AUTO: 2.53 10*3/MM3 (ref 0.7–3.1)
LYMPHOCYTES NFR BLD AUTO: 26.3 % (ref 19.6–45.3)
MCH RBC QN AUTO: 28.9 PG (ref 26.6–33)
MCHC RBC AUTO-ENTMCNC: 31 G/DL (ref 31.5–35.7)
MCV RBC AUTO: 93.1 FL (ref 79–97)
MONOCYTES # BLD AUTO: 0.5 10*3/MM3 (ref 0.1–0.9)
MONOCYTES NFR BLD AUTO: 5.2 % (ref 5–12)
NEUTROPHILS # BLD AUTO: 6.47 10*3/MM3 (ref 1.7–7)
NEUTROPHILS NFR BLD AUTO: 67.4 % (ref 42.7–76)
NRBC BLD AUTO-RTO: 0 /100 WBC (ref 0–0.2)
PLATELET # BLD AUTO: 288 10*3/MM3 (ref 140–450)
PMV BLD AUTO: 9.5 FL (ref 6–12)
RBC # BLD AUTO: 4.5 10*6/MM3 (ref 3.77–5.28)
WBC NRBC COR # BLD: 9.61 10*3/MM3 (ref 3.4–10.8)

## 2020-06-16 PROCEDURE — 85025 COMPLETE CBC W/AUTO DIFF WBC: CPT | Performed by: NURSE PRACTITIONER

## 2020-08-17 ENCOUNTER — OFFICE VISIT (OUTPATIENT)
Dept: ONCOLOGY | Facility: CLINIC | Age: 40
End: 2020-08-17

## 2020-08-17 ENCOUNTER — LAB (OUTPATIENT)
Dept: ONCOLOGY | Facility: CLINIC | Age: 40
End: 2020-08-17

## 2020-08-17 VITALS
WEIGHT: 230.9 LBS | OXYGEN SATURATION: 97 % | HEART RATE: 90 BPM | RESPIRATION RATE: 18 BRPM | DIASTOLIC BLOOD PRESSURE: 85 MMHG | SYSTOLIC BLOOD PRESSURE: 125 MMHG | BODY MASS INDEX: 39.63 KG/M2 | TEMPERATURE: 97.5 F

## 2020-08-17 DIAGNOSIS — D75.1 ERYTHROCYTOSIS: ICD-10-CM

## 2020-08-17 DIAGNOSIS — D72.829 LEUKOCYTOSIS, UNSPECIFIED TYPE: ICD-10-CM

## 2020-08-17 DIAGNOSIS — D75.1 ERYTHROCYTOSIS: Primary | ICD-10-CM

## 2020-08-17 LAB
BASOPHILS # BLD AUTO: 0.06 10*3/MM3 (ref 0–0.2)
BASOPHILS NFR BLD AUTO: 0.6 % (ref 0–1.5)
DEPRECATED RDW RBC AUTO: 54.1 FL (ref 37–54)
EOSINOPHIL # BLD AUTO: 0.06 10*3/MM3 (ref 0–0.4)
EOSINOPHIL NFR BLD AUTO: 0.6 % (ref 0.3–6.2)
ERYTHROCYTE [DISTWIDTH] IN BLOOD BY AUTOMATED COUNT: 14.8 % (ref 12.3–15.4)
HCT VFR BLD AUTO: 43 % (ref 34–46.6)
HGB BLD-MCNC: 13.1 G/DL (ref 12–15.9)
IMM GRANULOCYTES # BLD AUTO: 0.08 10*3/MM3 (ref 0–0.05)
IMM GRANULOCYTES NFR BLD AUTO: 0.9 % (ref 0–0.5)
LYMPHOCYTES # BLD AUTO: 2.81 10*3/MM3 (ref 0.7–3.1)
LYMPHOCYTES NFR BLD AUTO: 30 % (ref 19.6–45.3)
MCH RBC QN AUTO: 29.8 PG (ref 26.6–33)
MCHC RBC AUTO-ENTMCNC: 30.5 G/DL (ref 31.5–35.7)
MCV RBC AUTO: 97.7 FL (ref 79–97)
MONOCYTES # BLD AUTO: 0.48 10*3/MM3 (ref 0.1–0.9)
MONOCYTES NFR BLD AUTO: 5.1 % (ref 5–12)
NEUTROPHILS NFR BLD AUTO: 5.87 10*3/MM3 (ref 1.7–7)
NEUTROPHILS NFR BLD AUTO: 62.8 % (ref 42.7–76)
NRBC BLD AUTO-RTO: 0 /100 WBC (ref 0–0.2)
PLATELET # BLD AUTO: 328 10*3/MM3 (ref 140–450)
PMV BLD AUTO: 9.1 FL (ref 6–12)
RBC # BLD AUTO: 4.4 10*6/MM3 (ref 3.77–5.28)
WBC # BLD AUTO: 9.36 10*3/MM3 (ref 3.4–10.8)

## 2020-08-17 PROCEDURE — 85025 COMPLETE CBC W/AUTO DIFF WBC: CPT | Performed by: NURSE PRACTITIONER

## 2020-08-17 PROCEDURE — 99213 OFFICE O/P EST LOW 20 MIN: CPT | Performed by: NURSE PRACTITIONER

## 2020-08-17 NOTE — PROGRESS NOTES
DATE:  8/17/2020    REASON FOR FOLLOW UP: Erythrocytosis    REFERRING PHYSICIAN:  BOLIVAR Negro    CHIEF COMPLAINT:  Follow up of erythrocytosis    HISTORY OF PRESENT ILLNESS:   Sherrill Nathan is a 40 y.o. female who is being seen today at the request of BOLIVAR Negro  for evaluation and treatment of erythrocytosis. Ms. Nathan reports establishing care with her PCP in July 2019 and has been following with her routinely since then. In July 2019, she had blood testing with PCP and noted normal Hg/Hct. Previous available CBCs reviewed. Her Hg/Hct remained normal until October 2019 and had mildly elevated Hct at that time (47.1). Most recent CBC done on 1/3/20  showed elevated Hg (17.2) and Hct (53.3). Ms. Nathan reports smoking since she was 11 years old but has intermittently quit and restarted several times.  Patient reports she restarted smoking in October 2019 and currently smokes 1/2-1 PPD. She reports history of GURPREET but has not used CPAP for the past couple of years as she used this in [place] prior to moving to [place]. She complains of chronic fatigue. She reports having migraines and occasional dizziness and takes medicine per PCP. She also complains of neuropathy in bilateral hands/feet (worse in right hand and left foot). She denies any other complaints today.     INTERVAL HISTORY:   Ms. Nathan presents today for follow up of erythrocytosis. She reports that she has been doing well since her last visit. We have been checking her CBC monthly, she last required a phlebotomy on 03/30/2020. She has started smoking again, approximately 0.5 PPD. She also reports that she has a lot of stress in the home related to a niece that she allows to live with her. She states that she forgot to have chest x-ray done prior to coming in for follow up. She denies any other specific complaints today.        PAST MEDICAL HISTORY:  Past Medical History:   Diagnosis Date   • Arthritis    • Asthma    • Migraines    • Sleep apnea         PAST SURGICAL HISTORY:  Past Surgical History:   Procedure Laterality Date   •  SECTION     • RECTAL SURGERY         FAMILY HISTORY:  Family History   Problem Relation Age of Onset   • Leukemia Maternal Grandmother    • Breast cancer Maternal Grandfather        SOCIAL HISTORY:  Social History     Socioeconomic History   • Marital status:      Spouse name: Not on file   • Number of children: Not on file   • Years of education: Not on file   • Highest education level: Not on file   Tobacco Use   • Smoking status: Current Every Day Smoker     Packs/day: 1.00     Types: Cigarettes   • Smokeless tobacco: Never Used   Substance and Sexual Activity   • Alcohol use: Yes     Comment: rarely   • Drug use: Never   • Sexual activity: Defer       MEDICATIONS:  The current medication list was reviewed in the EMR    Current Outpatient Medications:   •  dicyclomine (BENTYL) 20 MG tablet, Take 1 tablet by mouth Every 6 (Six) Hours As Needed (abd pain)., Disp: 20 tablet, Rfl: 0  •  diphenoxylate-atropine (LOMOTIL) 2.5-0.025 MG per tablet, Take 1 tablet by mouth 4 (Four) Times a Day As Needed for Diarrhea., Disp: , Rfl:   •  FLUoxetine (PROzac) 10 MG capsule, Take 10 mg by mouth Daily., Disp: , Rfl:   •  folic acid (FOLVITE) 1 MG tablet, Take 1 tablet by mouth Daily., Disp: 30 tablet, Rfl: 3  •  hydrOXYzine pamoate (VISTARIL) 25 MG capsule, Take 25 mg by mouth 3 (Three) Times a Day As Needed for Itching., Disp: , Rfl:   •  lamoTRIgine (LaMICtal) 25 MG tablet, lamotrigine 25 mg tablet, Disp: , Rfl:   •  meclizine 25 MG chewable tablet chewable tablet, Chew 25 mg 3 (Three) Times a Day As Needed., Disp: , Rfl:   •  nortriptyline (PAMELOR) 75 MG capsule, Take 75 mg by mouth Every Night., Disp: , Rfl:   •  ondansetron ODT (ZOFRAN-ODT) 4 MG disintegrating tablet, Take 1 tablet by mouth Every 6 (Six) Hours As Needed for Nausea., Disp: 20 tablet, Rfl: 0  •  prazosin (MINIPRESS) 1 MG capsule, , Disp: , Rfl:   •   tiZANidine (ZANAFLEX) 4 MG tablet, Take 4 mg by mouth 3 (Three) Times a Day., Disp: , Rfl:   •  vitamin D (ERGOCALCIFEROL) 1.25 MG (80378 UT) capsule capsule, Take 50,000 Units by mouth 1 (One) Time Per Week., Disp: , Rfl:   •  zolpidem (AMBIEN) 5 MG tablet, Take 5 mg by mouth At Night As Needed for Sleep., Disp: , Rfl:     ALLERGIES:    Allergies   Allergen Reactions   • Amoxicillin Anaphylaxis and Hives   • Penicillins Anaphylaxis and Hives   • Sulfa Antibiotics Anaphylaxis and Hives     REVIEW OF SYSTEMS:    A comprehensive 14 point review of systems was performed.  Significant findings as mentioned above.  All other systems reviewed and are negative.      Physical Exam   Vital Signs: /85   Pulse 90   Temp 97.5 °F (36.4 °C) (Temporal)   Resp 18   Wt 105 kg (230 lb 14.4 oz)   SpO2 97%   BMI 39.63 kg/m²    General: Well developed, well nourished, alert and oriented x 3, in no acute distress.   Head: ATNC   Eyes: PERRL, No evidence of conjunctivitis.   Nose: No nasal discharge.   Mouth: Oral mucosal membranes moist. No oral ulceration or hemorrhages.   Neck: Neck supple. No thyromegaly. No JVD.   Lungs: Clear in all fields to A&P without rales, rhonchi or wheezing.   Heart: S1, S2. No murmurs, rubs, or gallops.   Abdomen: Soft. Bowel sounds are normoactive. Nontender with palpation.   Extremities: No cyanosis or edema.   Neurologic: Grossly non-focal exam    Pain Score:  Pain Score    08/17/20 1202   PainSc: 0-No pain     RECENT LABS:  Lab Results   Component Value Date    WBC 9.36 08/17/2020    HGB 13.1 08/17/2020    HCT 43.0 08/17/2020    MCV 97.7 (H) 08/17/2020    RDW 14.8 08/17/2020     08/17/2020    NEUTRORELPCT 62.8 08/17/2020    LYMPHORELPCT 30.0 08/17/2020    MONORELPCT 5.1 08/17/2020    EOSRELPCT 0.6 08/17/2020    BASORELPCT 0.6 08/17/2020    NEUTROABS 5.87 08/17/2020    LYMPHSABS 2.81 08/17/2020     Lab Results   Component Value Date     (L) 01/13/2020    K 3.8 01/13/2020    CO2  17.7 (L) 01/13/2020     01/13/2020    BUN 10 01/13/2020    CREATININE 0.69 01/13/2020    EGFRIFNONA 94 01/13/2020    GLUCOSE 146 (H) 01/13/2020    CALCIUM 8.8 01/13/2020    ALKPHOS 88 01/13/2020    AST 16 01/13/2020    ALT 13 01/13/2020    BILITOT 0.4 01/13/2020    ALBUMIN 3.64 01/13/2020    PROTEINTOT 7.2 01/13/2020    MG 1.8 10/11/2019     Workup 1/13/20    Folate  4.78 - 24.20 ng/mL 9.63      Erythropoietin  2.6 - 18.5 mIU/mL 6.6      JAK2 V617 Mutation Qnt Result Comment    Comment: NEGATIVE      BCR/ABL: Negative    Workup 02/10/20  JAK2 Exons 12-15 Mut Det PCR Comment    Comment: NEGATIVE      CALR Mutation Detection Result Comment    Comment: NEGATIVE     MPL Mutation Analysis Result: Comment    Comment: No MPL mutation was identified in the provided specimen of this   individual.     Flow Cytometry 02/24/20        ASSESSMENT & PLAN:  Sherrill Nathan is a very pleasant 40 y.o. female with    1.  Erythrocytosis:  -Patient established care with her PCP in July 2019 and has been following with her routinely since then. In July 2019, she had blood testing with PCP and noted normal Hg/Hct. Previous available CBCs reviewed. Her Hg/Hct remained normal until October 2019 and had mildly elevated Hct at that time (47.1). CBC from 1/3/20  showed elevated Hg (17.2) and Hct (53.3). Ms. Nathan reports smoking since she was 11 years old but has intermittently quit and restarted several times.  Patient reports she restarted smoking in October 2019 and currently smokes 1/2-1 PPD. She reports history of GURPREET but has not used CPAP for the past couple of years as she used this in [place] prior to moving to [place].   -Given history above (chronic heavy smoking and GURPREET), this is more than likely secondary (environmental) rather than a primary (PCV/myeloproliferation), underlying etiology.   -Obtained additional labs to further evaluate including PBS (as above), Erythropoietin was normal, [id number] and BCR/ABL were both negative.    - Repeat CBC from initial consultation showed improvement Hg (15.8) Hct (50.3).   - Also obtained JAK2 exon 12-15, CALR and MPL mutation which were negative.   - We referred her for sleep study/management of GURPREET which was done on 1/27 in [place]. Sleep study consistent with obstructive sleep apnea. She was not able to keep appointment as scheduled with Dr. Cornelius on 06/08/2020, provided patient with phone number to call and arrange a follow up appointment.  - Hct today 43.0, not requiring phlebotomy.    - Will follow up in 3 months with CBC. Patient has not had chest x-ray (for further secondary workup) done at this time, she will plan to have done prior to follow up in 3 months.    2. Folic acid deficiency:   - Continues to take folic acid 1 mg daily per PCP. Folate remains replete.     3. Leukocytosis:  - Low grade. Likely reactive and secondary to smoking. Previously checked BCR/ABL for issue #1 which was negative. Flow cytometry unremarkable (as above).   - WBC ~9.36, remains normalized. Will monitor. Strongly advised patient to quit smoking.    ACO / ZEKE/Other  Quality measures  -  Sherrill Nathan did not receive 2019 flu vaccine. This was recommended.  -  Sherrill Nathan reports a pain score of 0.   -  Current outpatient and discharge medications have been reconciled for the patient.  Reviewed by: BOLIVAR Pearson      The patient was in agreement with the plan and all questions were answered to her satisfaction.     Thank you so much for allowing us to participate in the care of Sherrill Nathan . Please do not hesitate to contact us with any questions or concerns.     A total of 15 minutes were spent coordinating this patient’s care in clinic today; more than 50% of this time was face-to-face with the patient, reviewing her medical history and counseling on the current treatment and followup plan. All questions were answered to her satisfaction.      Electronically Signed by: BOLIVAR Perry , August 17,  2020 12:20       CC:   Priti Ley

## 2020-11-19 ENCOUNTER — LAB (OUTPATIENT)
Dept: ONCOLOGY | Facility: CLINIC | Age: 40
End: 2020-11-19

## 2020-11-19 ENCOUNTER — HOSPITAL ENCOUNTER (OUTPATIENT)
Dept: GENERAL RADIOLOGY | Facility: HOSPITAL | Age: 40
Discharge: HOME | End: 2020-11-19
Admitting: NURSE PRACTITIONER

## 2020-11-19 ENCOUNTER — OFFICE VISIT (OUTPATIENT)
Dept: ONCOLOGY | Facility: CLINIC | Age: 40
End: 2020-11-19

## 2020-11-19 VITALS
SYSTOLIC BLOOD PRESSURE: 117 MMHG | TEMPERATURE: 97.3 F | DIASTOLIC BLOOD PRESSURE: 86 MMHG | HEART RATE: 104 BPM | BODY MASS INDEX: 41.2 KG/M2 | WEIGHT: 240 LBS | OXYGEN SATURATION: 98 %

## 2020-11-19 DIAGNOSIS — D75.1 ERYTHROCYTOSIS: ICD-10-CM

## 2020-11-19 DIAGNOSIS — D72.829 LEUKOCYTOSIS, UNSPECIFIED TYPE: ICD-10-CM

## 2020-11-19 DIAGNOSIS — D75.1 ERYTHROCYTOSIS: Primary | ICD-10-CM

## 2020-11-19 LAB
BASOPHILS # BLD AUTO: 0.04 10*3/MM3 (ref 0–0.2)
BASOPHILS NFR BLD AUTO: 0.5 % (ref 0–1.5)
DEPRECATED RDW RBC AUTO: 39.5 FL (ref 37–54)
EOSINOPHIL # BLD AUTO: 0.06 10*3/MM3 (ref 0–0.4)
EOSINOPHIL NFR BLD AUTO: 0.7 % (ref 0.3–6.2)
ERYTHROCYTE [DISTWIDTH] IN BLOOD BY AUTOMATED COUNT: 11.3 % (ref 12.3–15.4)
HCT VFR BLD AUTO: 46.1 % (ref 34–46.6)
HGB BLD-MCNC: 14.4 G/DL (ref 12–15.9)
IMM GRANULOCYTES # BLD AUTO: 0.04 10*3/MM3 (ref 0–0.05)
IMM GRANULOCYTES NFR BLD AUTO: 0.5 % (ref 0–0.5)
LYMPHOCYTES # BLD AUTO: 2.79 10*3/MM3 (ref 0.7–3.1)
LYMPHOCYTES NFR BLD AUTO: 32.1 % (ref 19.6–45.3)
MCH RBC QN AUTO: 29.6 PG (ref 26.6–33)
MCHC RBC AUTO-ENTMCNC: 31.2 G/DL (ref 31.5–35.7)
MCV RBC AUTO: 94.9 FL (ref 79–97)
MONOCYTES # BLD AUTO: 0.6 10*3/MM3 (ref 0.1–0.9)
MONOCYTES NFR BLD AUTO: 6.9 % (ref 5–12)
NEUTROPHILS NFR BLD AUTO: 5.16 10*3/MM3 (ref 1.7–7)
NEUTROPHILS NFR BLD AUTO: 59.3 % (ref 42.7–76)
NRBC BLD AUTO-RTO: 0 /100 WBC (ref 0–0.2)
PLATELET # BLD AUTO: 321 10*3/MM3 (ref 140–450)
PMV BLD AUTO: 9.7 FL (ref 6–12)
RBC # BLD AUTO: 4.86 10*6/MM3 (ref 3.77–5.28)
WBC # BLD AUTO: 8.69 10*3/MM3 (ref 3.4–10.8)

## 2020-11-19 PROCEDURE — 71046 X-RAY EXAM CHEST 2 VIEWS: CPT

## 2020-11-19 PROCEDURE — 71046 X-RAY EXAM CHEST 2 VIEWS: CPT | Performed by: RADIOLOGY

## 2020-11-19 PROCEDURE — 90694 VACC AIIV4 NO PRSRV 0.5ML IM: CPT | Performed by: NURSE PRACTITIONER

## 2020-11-19 PROCEDURE — 85025 COMPLETE CBC W/AUTO DIFF WBC: CPT | Performed by: NURSE PRACTITIONER

## 2020-11-19 PROCEDURE — 99214 OFFICE O/P EST MOD 30 MIN: CPT | Performed by: NURSE PRACTITIONER

## 2020-11-19 PROCEDURE — 90471 IMMUNIZATION ADMIN: CPT | Performed by: NURSE PRACTITIONER

## 2020-11-19 NOTE — PROGRESS NOTES
"DATE:  11/19/2020    REASON FOR FOLLOW UP: Erythrocytosis    REFERRING PHYSICIAN:  BOLIVAR Negro    CHIEF COMPLAINT:  Follow up of erythrocytosis    HISTORY OF PRESENT ILLNESS:   Sherrill Nathan is a 40 y.o. female who is being seen today at the request of BOLIVAR Negro  for evaluation and treatment of erythrocytosis. Ms. Nathan reports establishing care with her PCP in July 2019 and has been following with her routinely since then. In July 2019, she had blood testing with PCP and noted normal Hg/Hct. Previous available CBCs reviewed. Her Hg/Hct remained normal until October 2019 and had mildly elevated Hct at that time (47.1). Most recent CBC done on 1/3/20  showed elevated Hg (17.2) and Hct (53.3). Ms. Nathan reports smoking since she was 11 years old but has intermittently quit and restarted several times.  Patient reports she restarted smoking in October 2019 and currently smokes 1/2-1 PPD. She reports history of GURPREET but has not used CPAP for the past couple of years as she used this in [place] prior to moving to [place]. She complains of chronic fatigue. She reports having migraines and occasional dizziness and takes medicine per PCP. She also complains of neuropathy in bilateral hands/feet (worse in right hand and left foot). She denies any other complaints today.     INTERVAL HISTORY:   Ms. Nathan presents today for follow up of erythrocytosis. Overall, she reports that she has been doing well since her last visit. She reports that she has developed a \"tickle\" in her throat and is being referred to ENT. She last required a phlebotomy in March 2020. She continues to smoke 0.5 PPD, sometimes more depends on her stress level. She reports chronic fatigue but denies any worsening. Denies headaches or dizziness. She denies any other specific complaints today.          PAST MEDICAL HISTORY:  Past Medical History:   Diagnosis Date   • Arthritis    • Asthma    • Migraines    • Sleep apnea        PAST SURGICAL " HISTORY:  Past Surgical History:   Procedure Laterality Date   •  SECTION     • RECTAL SURGERY         FAMILY HISTORY:  Family History   Problem Relation Age of Onset   • Leukemia Maternal Grandmother    • Breast cancer Maternal Grandfather        SOCIAL HISTORY:  Social History     Socioeconomic History   • Marital status:      Spouse name: Not on file   • Number of children: Not on file   • Years of education: Not on file   • Highest education level: Not on file   Tobacco Use   • Smoking status: Current Every Day Smoker     Packs/day: 1.00     Types: Cigarettes   • Smokeless tobacco: Never Used   Substance and Sexual Activity   • Alcohol use: Yes     Comment: rarely   • Drug use: Never   • Sexual activity: Defer       MEDICATIONS:  The current medication list was reviewed in the EMR    Current Outpatient Medications:   •  dicyclomine (BENTYL) 20 MG tablet, Take 1 tablet by mouth Every 6 (Six) Hours As Needed (abd pain)., Disp: 20 tablet, Rfl: 0  •  diphenoxylate-atropine (LOMOTIL) 2.5-0.025 MG per tablet, Take 1 tablet by mouth 4 (Four) Times a Day As Needed for Diarrhea., Disp: , Rfl:   •  FLUoxetine (PROzac) 10 MG capsule, Take 10 mg by mouth Daily., Disp: , Rfl:   •  folic acid (FOLVITE) 1 MG tablet, Take 1 tablet by mouth Daily., Disp: 30 tablet, Rfl: 3  •  hydrOXYzine pamoate (VISTARIL) 25 MG capsule, Take 25 mg by mouth 3 (Three) Times a Day As Needed for Itching., Disp: , Rfl:   •  lamoTRIgine (LaMICtal) 25 MG tablet, lamotrigine 25 mg tablet, Disp: , Rfl:   •  meclizine 25 MG chewable tablet chewable tablet, Chew 25 mg 3 (Three) Times a Day As Needed., Disp: , Rfl:   •  nortriptyline (PAMELOR) 75 MG capsule, Take 75 mg by mouth Every Night., Disp: , Rfl:   •  ondansetron ODT (ZOFRAN-ODT) 4 MG disintegrating tablet, Take 1 tablet by mouth Every 6 (Six) Hours As Needed for Nausea., Disp: 20 tablet, Rfl: 0  •  prazosin (MINIPRESS) 1 MG capsule, , Disp: , Rfl:   •  tiZANidine (ZANAFLEX) 4 MG  tablet, Take 4 mg by mouth 3 (Three) Times a Day., Disp: , Rfl:   •  vitamin D (ERGOCALCIFEROL) 1.25 MG (99784 UT) capsule capsule, Take 50,000 Units by mouth 1 (One) Time Per Week., Disp: , Rfl:   •  zolpidem (AMBIEN) 5 MG tablet, Take 5 mg by mouth At Night As Needed for Sleep., Disp: , Rfl:     ALLERGIES:    Allergies   Allergen Reactions   • Amoxicillin Anaphylaxis and Hives   • Penicillins Anaphylaxis and Hives   • Sulfa Antibiotics Anaphylaxis and Hives     REVIEW OF SYSTEMS:    A comprehensive 14 point review of systems was performed.  Significant findings as mentioned above.  All other systems reviewed and are negative.      Physical Exam   Vital Signs: /86   Pulse 104   Temp 97.3 °F (36.3 °C)   Wt 109 kg (240 lb)   SpO2 98%   BMI 41.20 kg/m²    General: Well developed, well nourished, alert and oriented x 3, in no acute distress.   Head: ATNC   Eyes: PERRL, No evidence of conjunctivitis.   Nose: No nasal discharge.   Mouth: Oral mucosal membranes moist.   Neck: Neck supple. No thyromegaly. No JVD.   Lungs: Clear in all fields to A&P without rales, rhonchi or wheezing.   Heart: S1, S2. No murmurs, rubs, or gallops.   Abdomen: Soft. Bowel sounds are normoactive. Nontender with palpation.   Extremities: No cyanosis or edema.   Neurologic: Grossly non-focal exam    Pain Score:  Pain Score    11/19/20 0959   PainSc:   8         IMAGING:  Chest x-ray 11/19/2020  IMPRESSION:  No radiographic evidence of acute cardiac or pulmonary disease.      RECENT LABS:  Lab Results   Component Value Date    WBC 8.69 11/19/2020    HGB 14.4 11/19/2020    HCT 46.1 11/19/2020    MCV 94.9 11/19/2020    RDW 11.3 (L) 11/19/2020     11/19/2020    NEUTRORELPCT 59.3 11/19/2020    LYMPHORELPCT 32.1 11/19/2020    MONORELPCT 6.9 11/19/2020    EOSRELPCT 0.7 11/19/2020    BASORELPCT 0.5 11/19/2020    NEUTROABS 5.16 11/19/2020    LYMPHSABS 2.79 11/19/2020     Lab Results   Component Value Date     (L) 01/13/2020    K  3.8 01/13/2020    CO2 17.7 (L) 01/13/2020     01/13/2020    BUN 10 01/13/2020    CREATININE 0.69 01/13/2020    EGFRIFNONA 94 01/13/2020    GLUCOSE 146 (H) 01/13/2020    CALCIUM 8.8 01/13/2020    ALKPHOS 88 01/13/2020    AST 16 01/13/2020    ALT 13 01/13/2020    BILITOT 0.4 01/13/2020    ALBUMIN 3.64 01/13/2020    PROTEINTOT 7.2 01/13/2020    MG 1.8 10/11/2019     Workup 1/13/20    Folate  4.78 - 24.20 ng/mL 9.63      Erythropoietin  2.6 - 18.5 mIU/mL 6.6      JAK2 V617 Mutation Qnt Result Comment    Comment: NEGATIVE      BCR/ABL: Negative    Workup 02/10/20  JAK2 Exons 12-15 Mut Det PCR Comment    Comment: NEGATIVE      CALR Mutation Detection Result Comment    Comment: NEGATIVE     MPL Mutation Analysis Result: Comment    Comment: No MPL mutation was identified in the provided specimen of this   individual.     Flow Cytometry 02/24/20        ASSESSMENT & PLAN:  Sherrill Nathan is a very pleasant 40 y.o. female with    1.  Erythrocytosis:  -Patient established care with her PCP in July 2019 and has been following with her routinely since then. In July 2019, she had blood testing with PCP and noted normal Hg/Hct. Previous available CBCs reviewed. Her Hg/Hct remained normal until October 2019 and had mildly elevated Hct at that time (47.1). CBC from 1/3/20  showed elevated Hg (17.2) and Hct (53.3). Ms. Nathan reports smoking since she was 11 years old but has intermittently quit and restarted several times.  Patient reports she restarted smoking in October 2019 and currently smokes 1/2-1 PPD. She reports history of GURPREET but has not used CPAP for the past couple of years as she used this in [place] prior to moving to [place].   -Given history above (chronic heavy smoking and GURPREET), this is more than likely secondary (environmental) rather than a primary (PCV/myeloproliferation), underlying etiology.   -Obtained additional labs to further evaluate including PBS (as above), Erythropoietin was normal, [id number] and  BCR/ABL were both negative.   - Repeat CBC from initial consultation showed improvement Hg (15.8) Hct (50.3).   - Also obtained JAK2 exon 12-15, CALR and MPL mutation which were negative.   - We referred her for sleep study/management of GURPREET which was done on 1/27 in [place]. Sleep study consistent with obstructive sleep apnea. She currently lives with her niece and is looking for a place of her own. She plans to follow up with Dr. Cornelius once she finds her a place to live.   - Hct today 46.1, phlebotomy not indicated.  - Chest x-ray performed today and was unremarkable.  - Will follow up in 4 months with CBC.   - Again, strongly advised patient to quit smoking.    2. Folic acid deficiency:   - Continues to take folic acid 1 mg daily per PCP. Folate remains replete.     3. Leukocytosis:  - Low grade. Likely reactive and secondary to smoking. Previously checked BCR/ABL for issue #1 which was negative. Flow cytometry unremarkable (as above).   - WBC ~8.69, remains normalized. Will monitor.     ACO / ZEKE/Other  Quality measures  -  Sherrill Nathan did not receive 2020 flu vaccine. This was given in clinic today.  -  Sherrill Nathan reports a pain score of 8.  Given her pain assessment as noted, treatment options were discussed and the following options were decided upon as a follow-up plan to address the patient's pain: referral to Primary Care for assistance in pain treatment guidance.  -  Current outpatient and discharge medications have been reconciled for the patient.  Reviewed by: BOLIVAR Pearson      The patient was in agreement with the plan and all questions were answered to her satisfaction.     Thank you so much for allowing us to participate in the care of Sherrill Nathan . Please do not hesitate to contact us with any questions or concerns.     A total of 15 minutes were spent coordinating this patient’s care in clinic today; more than 50% of this time was face-to-face with the patient, reviewing her medical  history and counseling on the current treatment and followup plan. All questions were answered to her satisfaction.      Electronically Signed by: BOLIVAR Perry , November 19, 2020 10:25 EST       CC:   Priti Ley

## 2022-05-10 ENCOUNTER — HOSPITAL ENCOUNTER (EMERGENCY)
Facility: HOSPITAL | Age: 42
Discharge: HOME | End: 2022-05-10
Attending: STUDENT IN AN ORGANIZED HEALTH CARE EDUCATION/TRAINING PROGRAM | Admitting: EMERGENCY MEDICINE

## 2022-05-10 ENCOUNTER — APPOINTMENT (OUTPATIENT)
Dept: GENERAL RADIOLOGY | Facility: HOSPITAL | Age: 42
End: 2022-05-10

## 2022-05-10 VITALS
WEIGHT: 240 LBS | TEMPERATURE: 97.7 F | HEIGHT: 64 IN | HEART RATE: 75 BPM | BODY MASS INDEX: 40.97 KG/M2 | OXYGEN SATURATION: 100 % | SYSTOLIC BLOOD PRESSURE: 129 MMHG | DIASTOLIC BLOOD PRESSURE: 80 MMHG | RESPIRATION RATE: 20 BRPM

## 2022-05-10 DIAGNOSIS — R07.89 ATYPICAL CHEST PAIN: Primary | ICD-10-CM

## 2022-05-10 LAB
ALBUMIN SERPL-MCNC: 3.88 G/DL (ref 3.5–5.2)
ALBUMIN/GLOB SERPL: 1.4 G/DL
ALP SERPL-CCNC: 79 U/L (ref 39–117)
ALT SERPL W P-5'-P-CCNC: 14 U/L (ref 1–33)
ANION GAP SERPL CALCULATED.3IONS-SCNC: 10.7 MMOL/L (ref 5–15)
AST SERPL-CCNC: 17 U/L (ref 1–32)
BASOPHILS # BLD AUTO: 0.03 10*3/MM3 (ref 0–0.2)
BASOPHILS NFR BLD AUTO: 0.3 % (ref 0–1.5)
BILIRUB SERPL-MCNC: 0.2 MG/DL (ref 0–1.2)
BUN SERPL-MCNC: 9 MG/DL (ref 6–20)
BUN/CREAT SERPL: 10 (ref 7–25)
CALCIUM SPEC-SCNC: 8.7 MG/DL (ref 8.6–10.5)
CHLORIDE SERPL-SCNC: 105 MMOL/L (ref 98–107)
CO2 SERPL-SCNC: 21.3 MMOL/L (ref 22–29)
CREAT SERPL-MCNC: 0.9 MG/DL (ref 0.57–1)
D DIMER PPP FEU-MCNC: 0.39 MCGFEU/ML (ref 0–0.5)
DEPRECATED RDW RBC AUTO: 40.5 FL (ref 37–54)
EGFRCR SERPLBLD CKD-EPI 2021: 81.5 ML/MIN/1.73
EOSINOPHIL # BLD AUTO: 0.07 10*3/MM3 (ref 0–0.4)
EOSINOPHIL NFR BLD AUTO: 0.7 % (ref 0.3–6.2)
ERYTHROCYTE [DISTWIDTH] IN BLOOD BY AUTOMATED COUNT: 12.2 % (ref 12.3–15.4)
GLOBULIN UR ELPH-MCNC: 2.8 GM/DL
GLUCOSE SERPL-MCNC: 114 MG/DL (ref 65–99)
HCG SERPL QL: NEGATIVE
HCT VFR BLD AUTO: 43 % (ref 34–46.6)
HGB BLD-MCNC: 14 G/DL (ref 12–15.9)
HOLD SPECIMEN: NORMAL
IMM GRANULOCYTES # BLD AUTO: 0.03 10*3/MM3 (ref 0–0.05)
IMM GRANULOCYTES NFR BLD AUTO: 0.3 % (ref 0–0.5)
LYMPHOCYTES # BLD AUTO: 3.42 10*3/MM3 (ref 0.7–3.1)
LYMPHOCYTES NFR BLD AUTO: 36 % (ref 19.6–45.3)
MCH RBC QN AUTO: 29.7 PG (ref 26.6–33)
MCHC RBC AUTO-ENTMCNC: 32.6 G/DL (ref 31.5–35.7)
MCV RBC AUTO: 91.1 FL (ref 79–97)
MONOCYTES # BLD AUTO: 0.53 10*3/MM3 (ref 0.1–0.9)
MONOCYTES NFR BLD AUTO: 5.6 % (ref 5–12)
NEUTROPHILS NFR BLD AUTO: 5.41 10*3/MM3 (ref 1.7–7)
NEUTROPHILS NFR BLD AUTO: 57.1 % (ref 42.7–76)
NRBC BLD AUTO-RTO: 0 /100 WBC (ref 0–0.2)
PLATELET # BLD AUTO: 281 10*3/MM3 (ref 140–450)
PMV BLD AUTO: 9.9 FL (ref 6–12)
POTASSIUM SERPL-SCNC: 3.9 MMOL/L (ref 3.5–5.2)
PROT SERPL-MCNC: 6.7 G/DL (ref 6–8.5)
QT INTERVAL: 362 MS
QTC INTERVAL: 471 MS
RBC # BLD AUTO: 4.72 10*6/MM3 (ref 3.77–5.28)
SODIUM SERPL-SCNC: 137 MMOL/L (ref 136–145)
TROPONIN T SERPL-MCNC: <0.01 NG/ML (ref 0–0.03)
WBC NRBC COR # BLD: 9.49 10*3/MM3 (ref 3.4–10.8)
WHOLE BLOOD HOLD COAG: NORMAL
WHOLE BLOOD HOLD SPECIMEN: NORMAL

## 2022-05-10 PROCEDURE — 85025 COMPLETE CBC W/AUTO DIFF WBC: CPT | Performed by: PHYSICIAN ASSISTANT

## 2022-05-10 PROCEDURE — 99283 EMERGENCY DEPT VISIT LOW MDM: CPT

## 2022-05-10 PROCEDURE — 84484 ASSAY OF TROPONIN QUANT: CPT | Performed by: PHYSICIAN ASSISTANT

## 2022-05-10 PROCEDURE — 84703 CHORIONIC GONADOTROPIN ASSAY: CPT | Performed by: PHYSICIAN ASSISTANT

## 2022-05-10 PROCEDURE — 80053 COMPREHEN METABOLIC PANEL: CPT | Performed by: PHYSICIAN ASSISTANT

## 2022-05-10 PROCEDURE — 25010000002 KETOROLAC TROMETHAMINE PER 15 MG: Performed by: PHYSICIAN ASSISTANT

## 2022-05-10 PROCEDURE — 71045 X-RAY EXAM CHEST 1 VIEW: CPT

## 2022-05-10 PROCEDURE — 93005 ELECTROCARDIOGRAM TRACING: CPT | Performed by: EMERGENCY MEDICINE

## 2022-05-10 PROCEDURE — 85379 FIBRIN DEGRADATION QUANT: CPT | Performed by: PHYSICIAN ASSISTANT

## 2022-05-10 PROCEDURE — 96374 THER/PROPH/DIAG INJ IV PUSH: CPT

## 2022-05-10 RX ORDER — SODIUM CHLORIDE 0.9 % (FLUSH) 0.9 %
10 SYRINGE (ML) INJECTION AS NEEDED
Status: DISCONTINUED | OUTPATIENT
Start: 2022-05-10 | End: 2022-05-10 | Stop reason: HOSPADM

## 2022-05-10 RX ORDER — KETOROLAC TROMETHAMINE 30 MG/ML
30 INJECTION, SOLUTION INTRAMUSCULAR; INTRAVENOUS ONCE
Status: COMPLETED | OUTPATIENT
Start: 2022-05-10 | End: 2022-05-10

## 2022-05-10 RX ORDER — KETOROLAC TROMETHAMINE 10 MG/1
10 TABLET, FILM COATED ORAL EVERY 6 HOURS PRN
Qty: 12 TABLET | Refills: 0 | Status: SHIPPED | OUTPATIENT
Start: 2022-05-10 | End: 2023-01-17 | Stop reason: ALTCHOICE

## 2022-05-10 RX ORDER — ASPIRIN 81 MG/1
324 TABLET, CHEWABLE ORAL ONCE
Status: COMPLETED | OUTPATIENT
Start: 2022-05-10 | End: 2022-05-10

## 2022-05-10 RX ADMIN — KETOROLAC TROMETHAMINE 30 MG: 30 INJECTION, SOLUTION INTRAMUSCULAR at 20:35

## 2022-05-10 RX ADMIN — SODIUM CHLORIDE 1000 ML: 9 INJECTION, SOLUTION INTRAVENOUS at 20:35

## 2022-05-10 RX ADMIN — ASPIRIN 324 MG: 81 TABLET, CHEWABLE ORAL at 18:09

## 2022-05-10 NOTE — ED PROVIDER NOTES
Subjective   PCP sent her here for Chest pain, which is sharp in nature. Pain began last night.   Pt has hx of DM, Sleep apnea, tobacco abuse       History provided by:  Patient  Chest Pain  Pain location:  L chest  Pain quality: sharp    Pain radiates to:  Does not radiate  Pain severity:  Mild  Onset quality:  Sudden  Duration:  1 day  Timing:  Intermittent  Progression:  Worsening  Chronicity:  New  Context: breathing    Relieved by:  Nothing  Worsened by:  Nothing  Ineffective treatments:  None tried  Associated symptoms: shortness of breath    Associated symptoms: no cough, no fever, no headache, no nausea and no vomiting    Risk factors: smoking        Review of Systems   Constitutional: Negative for chills and fever.   HENT: Negative for congestion, ear pain and sore throat.    Respiratory: Positive for shortness of breath. Negative for cough and wheezing.    Cardiovascular: Positive for chest pain.   Gastrointestinal: Negative for diarrhea, nausea and vomiting.   Genitourinary: Negative for dysuria and flank pain.   Skin: Negative for rash.   Neurological: Negative for headaches.   Psychiatric/Behavioral: The patient is not nervous/anxious.    All other systems reviewed and are negative.      Past Medical History:   Diagnosis Date   • Arthritis    • Asthma    • Migraines    • Sleep apnea        Allergies   Allergen Reactions   • Amoxicillin Anaphylaxis and Hives   • Penicillins Anaphylaxis and Hives   • Sulfa Antibiotics Anaphylaxis and Hives       Past Surgical History:   Procedure Laterality Date   •  SECTION     • RECTAL SURGERY         Family History   Problem Relation Age of Onset   • Leukemia Maternal Grandmother    • Breast cancer Maternal Grandfather        Social History     Socioeconomic History   • Marital status:    Tobacco Use   • Smoking status: Current Every Day Smoker     Packs/day: 1.00     Types: Cigarettes   • Smokeless tobacco: Never Used   Substance and Sexual Activity    • Alcohol use: Yes     Comment: rarely   • Drug use: Never   • Sexual activity: Defer           Objective   Physical Exam    Procedures           ED Course  ED Course as of 05/10/22 2019   Tue May 10, 2022   1709 EKG noted sinus tachycardia.  102 bpm.  .  QRS 88.  QTc 471.  No acute ST elevation [SF]   1956 Patient was discussed with Dr. Still at shift change.  Report given [LC]   2012 CXR : negative [ADRIAN]      ED Course User Index  [ADRIAN] Dangelo Still MD  [LC] Latosha Still PA  [SF] Jb Freitas,                                                  MDM    Final diagnoses:   Atypical chest pain       ED Disposition  ED Disposition     ED Disposition   Discharge    Condition   Stable    Comment   --             Kreis, Samuel Duane, MD  [address]   [place] [address] [phone]    Schedule an appointment as soon as possible for a visit   If symptoms worsen         Medication List      New Prescriptions    ketorolac 10 MG tablet  Commonly known as: TORADOL  Take 1 tablet by mouth Every 6 (Six) Hours As Needed for Moderate Pain .           Where to Get Your Medications      These medications were sent to [facility] [place], [place] - [address] [place] [address] - [phone]  - [phone] FX  [address] [place] , [place] [address]    Phone: [phone]   · ketorolac 10 MG tablet          Dangelo Still MD  05/10/22 2019

## 2022-05-10 NOTE — ED NOTES
MEDICAL SCREENING:    Reason for Visit: Chest pain which is sharp in nature.  No history of PE/DVT.  No leg swelling.  Started last evening has been constant.    Patient initially seen in triage.  The patient was advised further evaluation and diagnostic testing will be needed, some of the treatment and testing will be initiated in the lobby in order to begin the process.  The patient will be returned to the waiting area for the time being and possibly be re-assessed by a subsequent ED provider.  The patient will be brought back to the treatment area in as timely manner as possible.         Oscar Bauman PA  05/10/22 1717
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
## 2022-11-28 NOTE — PROGRESS NOTES
[facility]  Established Patient Office Visit  [address]  [phone]        OFFICE VISIT:  4/3/2019    [facility] - : 1980    Reason For Visit:  Justyna Drew is a 43 y.o. female who is here for Follow-up and Other (need approval for pain management to inject)    1. Essential hypertension    2. Hyperlipidemia, unspecified hyperlipidemia type    3. Coronary artery disease involving native coronary artery of native heart without angina pectoris    Patient presented to clinic on 2019 as a referral from primary care. Patient had an ER visit on 2019 for chest pain. Troponins negative. EKG without acute changes. Chest x-ray no radiographic evidence of acute carddiopulmonary process. She was discharged from the ER with instructions for an outpatient stress test.   Patient saw her primary care provider on 2019 who ordered a stress echo. Stress echo 2019 revealed stress echocardiogram with clinical evidence of myocardial ischemia, chest pain with exercise. Patient with poor exercise tolerance. No electrocardiographic evidence of ischemia and no definite segmental wall motion abnormality. Clinically positive stress echo.     Patient stated that she has had several more episodes of chest discomfort. Last episode was a couple days prior to this last clinic appointment. Pain was described as midsternal with radiation to her left arm and jaw and left ear. Patient stated she was sitting on the couch when the episode started she associates this chest pain with shortness of breath, diaphoresis and nausea. Episode lasted about 30 minutes to an hour. No relieving factors other than rest.     Patient is a former smoker she quit 2 years ago December. She does have a family history of coronary artery disease which is her father.     Risk factors include: Family history, former smoker, hypertensive, hyperlipidemia, and obesity.     Patient was scheduled for tablet Take 1 tablet by mouth every 6 hours as needed for Pain 60 tablet 1    DULoxetine (CYMBALTA) 30 MG extended release capsule Take 1 capsule by mouth 2 times daily 60 capsule 3    nortriptyline (PAMELOR) 75 MG capsule Take 1 capsule by mouth nightly 90 capsule 1    albuterol sulfate HFA (VENTOLIN HFA) 108 (90 Base) MCG/ACT inhaler Inhale 2 puffs into the lungs every 6 hours as needed for Wheezing 1 Inhaler 2    SUMAtriptan (IMITREX) 100 MG tablet Take 1 tablet by mouth once as needed for Migraine 9 tablet 2     No current facility-administered medications for this visit. Allergies: Pcn [penicillins] and Sulfa antibiotics  Past Medical History:   Diagnosis Date    Asthma     Bipolar 1 disorder ([facility] [address]     Degenerative disc disease, lumbar     Depression     Foot fracture, right     Hypertension     Indigestion     Lumbar radiculopathy     Migraines     Neuropathy      Past Surgical History:   Procedure Laterality Date     SECTION      ELBOW SURGERY      right    RECTAL SURGERY      x2     No family history on file.   Social History     Tobacco Use    Smoking status: Former Smoker     Packs/day: 1.00     Years: 10.00     Pack years: 10.00     Types: Cigarettes     Last attempt to quit: 2017     Years since quittin.3    Smokeless tobacco: Never Used   Substance Use Topics    Alcohol use: No          Review of Systems:    General:      Complaint / Symptom Yes / No / Description if Yes       Fatigue No**/6    Weight gain N/A   Insomnia N/A       Respiratory:        Complaint / Symptom Yes / No / Description if Yes       Cough No   Horseness N/A       Cardiovascular:    Complaint / Symptom Yes / No / Description if Yes       Chest Pain No   Shortness of Air / Orthopnea No   Presyncope / Syncope No   Palpitations No         Objective:    /78 (Site: Right Upper Arm, Position: Sitting, Cuff Size: Medium Adult)   Pulse 87   Ht 5' 4\" (1.626 m)   Wt 277 lb (125.6 kg)   BMI 47.55 kg/m²     GENERAL - well developed and well nourished, conversant  HEENT -  PERRLA, Hearing appears normal  NECK - no thyromegaly, no JVD, trachea is in the midline  CARDIOVASCULAR - PMI is in the mid line clavicular position, Normal S1 and S2 with no systolic murmur. No S3 or S4    PULMONARY - no respiratory distress. No wheezes or rales. Lungs are clear to ausculation   ABDOMEN  - soft, non tender, no rebound  MUSCULOSKELETAL  - range of motion of the upper and lower extermites appears normal and equal and is without pain   EXTREMITIES - no significant edema   NEUROLOGIC - gait and station are normal  SKIN - turgor is normal  PSYCHIATRIC - normal mood and affect, alert and orientated x 3,      ASSESSMENT:    ALL THE CARDIOLOGY PROBLEMS ARE LISTED ABOVE; HOWEVER, THE FOLLOWING SPECIFIC CARDIAC PROBLEMS / CONDITIONS WERE ADDRESSED AND TREATED DURING THE OFFICE VISIT TODAY:                                                                                            MEDICAL DECISION MAKING             Cardiac Specific Problem / Diagnosis  Discussion and Data Reviewed Diagnostic Procedures Ordered Management Options Selected           1. CAD   Stable   Review and summation of old records:    Mild CAD per cardiac cath. Normal LV systolic function. No Continue current medications:     Okay for pain management for injections. 2. Hypertension   Well Controlled   Blood pressure in the office today 122/78. No Continue current medications:    Yes           3. Hyperlipidemia  Stable Lipids followed by primary care provider  No Continue current medications: Yes                     No orders of the defined types were placed in this encounter. No orders of the defined types were placed in this encounter. Discussed with patient. Return in about 6 months (around 10/3/2019) for Routine .     I greatly appreciate the opportunity to meet Brandie Bolaños and your confidence in allowing me to participate in her cardiovascular care.     SID Villagran NP  4/3/2019 9:58 AM Hatchet Flap Text: The defect edges were debeveled with a #15 scalpel blade.  Given the location of the defect, shape of the defect and the proximity to free margins a hatchet flap was deemed most appropriate.  Using a sterile surgical marker, an appropriate hatchet flap was drawn incorporating the defect and placing the expected incisions within the relaxed skin tension lines where possible.    The area thus outlined was incised deep to adipose tissue with a #15 scalpel blade.  The skin margins were undermined to an appropriate distance in all directions utilizing iris scissors.

## 2022-12-15 ENCOUNTER — OFFICE VISIT (OUTPATIENT)
Dept: ORTHOPEDIC SURGERY | Facility: CLINIC | Age: 42
End: 2022-12-15

## 2022-12-15 ENCOUNTER — TELEPHONE (OUTPATIENT)
Dept: ORTHOPEDIC SURGERY | Facility: CLINIC | Age: 42
End: 2022-12-15

## 2022-12-15 ENCOUNTER — HOSPITAL ENCOUNTER (OUTPATIENT)
Dept: GENERAL RADIOLOGY | Facility: HOSPITAL | Age: 42
Discharge: HOME | End: 2022-12-15
Admitting: FAMILY MEDICINE

## 2022-12-15 VITALS
WEIGHT: 224 LBS | OXYGEN SATURATION: 98 % | BODY MASS INDEX: 38.24 KG/M2 | HEART RATE: 76 BPM | DIASTOLIC BLOOD PRESSURE: 88 MMHG | HEIGHT: 64 IN | SYSTOLIC BLOOD PRESSURE: 123 MMHG

## 2022-12-15 DIAGNOSIS — R20.2 PARESTHESIA OF LEFT ARM: ICD-10-CM

## 2022-12-15 DIAGNOSIS — G56.22 CUBITAL TUNNEL SYNDROME ON LEFT: ICD-10-CM

## 2022-12-15 DIAGNOSIS — M25.522 LEFT ELBOW PAIN: Primary | ICD-10-CM

## 2022-12-15 DIAGNOSIS — R20.2 LEFT HAND PARESTHESIA: ICD-10-CM

## 2022-12-15 DIAGNOSIS — R29.898 LEFT HAND WEAKNESS: ICD-10-CM

## 2022-12-15 DIAGNOSIS — Z98.890 S/P DECOMPRESSION OF ULNAR NERVE AT ELBOW: ICD-10-CM

## 2022-12-15 DIAGNOSIS — M79.602 PAIN OF LEFT UPPER EXTREMITY: ICD-10-CM

## 2022-12-15 DIAGNOSIS — G56.02 CARPAL TUNNEL SYNDROME OF LEFT WRIST: ICD-10-CM

## 2022-12-15 PROCEDURE — 73080 X-RAY EXAM OF ELBOW: CPT

## 2022-12-15 PROCEDURE — 73080 X-RAY EXAM OF ELBOW: CPT | Performed by: RADIOLOGY

## 2022-12-15 PROCEDURE — 99204 OFFICE O/P NEW MOD 45 MIN: CPT | Performed by: FAMILY MEDICINE

## 2022-12-15 RX ORDER — RIMEGEPANT SULFATE 75 MG/75MG
TABLET, ORALLY DISINTEGRATING ORAL
COMMUNITY
Start: 2022-11-18

## 2022-12-15 RX ORDER — BLOOD SUGAR DIAGNOSTIC
STRIP MISCELLANEOUS
COMMUNITY
Start: 2022-11-16

## 2022-12-15 RX ORDER — GALCANEZUMAB 120 MG/ML
INJECTION, SOLUTION SUBCUTANEOUS
COMMUNITY
Start: 2022-12-07

## 2022-12-15 RX ORDER — ALBUTEROL SULFATE 90 UG/1
AEROSOL, METERED RESPIRATORY (INHALATION)
COMMUNITY
Start: 2022-10-14

## 2022-12-15 RX ORDER — ATORVASTATIN CALCIUM 20 MG/1
20 TABLET, FILM COATED ORAL DAILY
COMMUNITY
Start: 2022-03-02

## 2022-12-15 RX ORDER — SEMAGLUTIDE 1.34 MG/ML
1.5 INJECTION, SOLUTION SUBCUTANEOUS
COMMUNITY
Start: 2022-09-19

## 2022-12-15 RX ORDER — LISINOPRIL 20 MG/1
20 TABLET ORAL DAILY
COMMUNITY
Start: 2022-03-02

## 2022-12-15 RX ORDER — PRAZOSIN HYDROCHLORIDE 1 MG/1
1 CAPSULE ORAL
COMMUNITY
Start: 2022-04-27

## 2022-12-15 RX ORDER — ASPIRIN 81 MG/1
1 TABLET ORAL DAILY
COMMUNITY
Start: 2022-03-02

## 2022-12-15 NOTE — PROGRESS NOTES
New Patient Visit      Patient: Sherrill Nathan  YOB: 1980  Date of Encounter: 12/15/2022  PCP: Kreis, Samuel Duane, MD  Referring Provider: BOLIVAR Negro   Sherrill Nathan is a 42 y.o. female who presents to the office today for evaluation of Pain, Edema, Numbness, Tingling, and Initial Evaluation of the Left Elbow      Chief Complaint   Patient presents with   • Left Elbow - Pain, Edema, Numbness, Tingling, Initial Evaluation       HPI     Patient presents with her friend Aleksandr for evaluation of her left arm, wrist pain, and paresthesia. Patient has had chronic issues with numbness and paresthesia in the arms and was seeing Dr. Hodges at [place] who performed an ulnar nerve release on the left elbow on 07/08/2022. Patient states that since then, she has had ongoing pain, numbness, and tingling which never went away and is in fact worse since the surgery. Patient has not had any reevaluation and states that she was supposed to go to therapy after, but it was never scheduled. States that she has been trying to get back in unsuccessfully to see the surgeon. States they were planning on doing a carpal tunnel release also as she has significant paresthesia and pain symptoms in all five fingers, but that has not been done yet. She is right handed. She complains of pain radiating up into the posterior arm as well as up the forearm from the wrist. Notes weakness with . Notes that she had physical therapy for the carpal tunnel at one point which was not helpful and actually flared it up. Has not had any injections, does not have a brace. Patient did no therapy whatsoever after her elbow surgery. She also admits to ongoing neck pain that does radiate into the shoulder sometimes, but has not been evaluated.      There is no problem list on file for this patient.      Past Medical History:   Diagnosis Date   • Acid reflux    • Arthritis    • Asthma    • Claustrophobia    • Diabetes (HCC)    • High  blood pressure    • High cholesterol    • Migraines    • Osteoarthritis    • Sleep apnea        Past Surgical History:   Procedure Laterality Date   •  SECTION     • ELBOW PROCEDURE Left    • RECTAL SURGERY         Family History   Problem Relation Age of Onset   • Hypertension Mother    • Cancer Father    • Rheum arthritis Sister    • Hypertension Maternal Grandmother    • Cancer Maternal Grandmother    • Osteoarthritis Maternal Grandmother    • Leukemia Maternal Grandmother    • Heart disease Maternal Grandmother    • Hypertension Maternal Grandfather    • Diabetes Maternal Grandfather    • Heart disease Maternal Grandfather    • Cancer Maternal Grandfather    • Osteoarthritis Maternal Grandfather    • Breast cancer Maternal Grandfather        Social History     Socioeconomic History   • Marital status:    Tobacco Use   • Smoking status: Every Day     Packs/day: 1.00     Types: Cigarettes   • Smokeless tobacco: Never   Vaping Use   • Vaping Use: Never used   Substance and Sexual Activity   • Alcohol use: Yes     Comment: rarely   • Drug use: Never   • Sexual activity: Defer       Current Outpatient Medications   Medication Sig Dispense Refill   • aspirin 81 MG EC tablet Take 1 tablet by mouth Daily.     • atorvastatin (LIPITOR) 20 MG tablet Take 20 mg by mouth Daily.     • diphenoxylate-atropine (LOMOTIL) 2.5-0.025 MG per tablet Take 1 tablet by mouth 4 (Four) Times a Day As Needed for Diarrhea.     • Emgality 120 MG/ML auto-injector pen      • folic acid (FOLVITE) 1 MG tablet Take 1 tablet by mouth Daily. 30 tablet 3   • lisinopril (PRINIVIL,ZESTRIL) 20 MG tablet Take 20 mg by mouth Daily.     • nortriptyline (PAMELOR) 75 MG capsule Take 75 mg by mouth Every Night.     • Nurtec 75 MG dispersible tablet      • ondansetron ODT (ZOFRAN-ODT) 4 MG disintegrating tablet Take 1 tablet by mouth Every 6 (Six) Hours As Needed for Nausea. 20 tablet 0   • OneTouch Verio test strip      • prazosin (MINIPRESS)  1 MG capsule Take 1 capsule by mouth.     • Semaglutide,0.25 or 0.5MG/DOS, (Ozempic, 0.25 or 0.5 MG/DOSE,) 2 MG/1.5ML solution pen-injector Inject 1.5 mL under the skin into the appropriate area as directed Every 7 (Seven) Days.     • tiZANidine (ZANAFLEX) 4 MG tablet Take 4 mg by mouth 3 (Three) Times a Day.     • Ventolin  (90 Base) MCG/ACT inhaler      • vitamin D (ERGOCALCIFEROL) 1.25 MG (06244 UT) capsule capsule Take 50,000 Units by mouth 1 (One) Time Per Week.     • Diclofenac Sodium (VOLTAREN) 1 % gel gel Apply 2 g topically to the appropriate area as directed 4 (Four) Times a Day As Needed (elbow and wrist pain). Apply to elbow and wrist 150 g 2   • dicyclomine (BENTYL) 20 MG tablet Take 1 tablet by mouth Every 6 (Six) Hours As Needed (abd pain). 20 tablet 0   • FLUoxetine (PROzac) 10 MG capsule Take 10 mg by mouth Daily.     • hydrOXYzine pamoate (VISTARIL) 25 MG capsule Take 25 mg by mouth 3 (Three) Times a Day As Needed for Itching.     • ketorolac (TORADOL) 10 MG tablet Take 1 tablet by mouth Every 6 (Six) Hours As Needed for Moderate Pain . 12 tablet 0   • lamoTRIgine (LaMICtal) 25 MG tablet lamotrigine 25 mg tablet     • meclizine 25 MG chewable tablet chewable tablet Chew 25 mg 3 (Three) Times a Day As Needed.     • zolpidem (AMBIEN) 5 MG tablet Take 5 mg by mouth At Night As Needed for Sleep.       No current facility-administered medications for this visit.       Allergies   Allergen Reactions   • Amoxicillin Anaphylaxis and Hives   • Penicillins Anaphylaxis and Hives   • Sulfa Antibiotics Anaphylaxis and Hives       Review of Systems   Constitutional: Positive for activity change. Negative for fever.   Respiratory: Negative for shortness of breath and wheezing.    Cardiovascular: Negative for chest pain.   Musculoskeletal: Positive for arthralgias, joint swelling, myalgias, neck pain and neck stiffness.   Skin: Negative for color change and wound.   Neurological: Positive for weakness and  "numbness.       Visit Vitals  /88   Pulse 76   Ht 162.6 cm (64\")   Wt 102 kg (224 lb)   SpO2 98%   BMI 38.45 kg/m²     42 y.o.female  Physical Exam  Vitals and nursing note reviewed.   Constitutional:       General: She is not in acute distress.     Appearance: Normal appearance.   Pulmonary:      Effort: Pulmonary effort is normal. No respiratory distress.   Musculoskeletal:      Left elbow: Swelling present. Normal range of motion. Tenderness present in radial head, medial epicondyle, lateral epicondyle and olecranon process.      Left forearm: Tenderness present.      Left wrist: Tenderness present. No effusion. Normal range of motion. Normal pulse.      Left hand: Tenderness (fingertips) present. Decreased strength. Normal strength of finger abduction, thumb/finger opposition and wrist extension. Decreased sensation of the ulnar distribution and median distribution. There is disruption of two-point discrimination. Normal pulse.      Comments: Weakness of  strength on the left.  Intact symmetric pinch and intrinsic strength.  Aberration of two-point sensation on left fingers 2 through 5.  Positive Tinel, prayer, Phalen, carpal tunnel compression test recreating pain and paresthesias in the entire hand.    Left elbow and forearm generally tender from mid forearm back to elbow and along distal tricep.  Pain on all resisted testing of the elbow and forearm and wrist including  extension flexion and elbow extension.  Negative varus valgus stress.  Localized edema at surgical site on medial elbow.  Healing surgical scar without dehiscence or infection.    C-spine: Lower C-spine tenderness.  Positive left Spurling radiating down into the arm and recreating some of the patient's symptoms.   Skin:     General: Skin is warm and dry.      Findings: No erythema.   Neurological:      Mental Status: She is alert and oriented to person, place, and time.      Sensory: Sensory deficit present.      Motor: Weakness " present.         Radiology Results:    XR Elbow 3+ View Left    Result Date: 12/16/2022    No acute findings in the left elbow.  This report was finalized on 12/16/2022 8:30 AM by Dr. Romeo Guerin MD.      X-ray left elbow: My preliminary interpretation  No acute fractures or bony abnormalities.  Mild degenerative changes  Awaiting final read from radiologist    Assessment & Plan   Diagnoses and all orders for this visit:    1. Left elbow pain (Primary)  -     Diclofenac Sodium (VOLTAREN) 1 % gel gel; Apply 2 g topically to the appropriate area as directed 4 (Four) Times a Day As Needed (elbow and wrist pain). Apply to elbow and wrist  Dispense: 150 g; Refill: 2  -     Ambulatory Referral to Orthopedic Surgery    2. Cubital tunnel syndrome on left  -     Diclofenac Sodium (VOLTAREN) 1 % gel gel; Apply 2 g topically to the appropriate area as directed 4 (Four) Times a Day As Needed (elbow and wrist pain). Apply to elbow and wrist  Dispense: 150 g; Refill: 2  -     Ambulatory Referral to Orthopedic Surgery    3. Carpal tunnel syndrome of left wrist  -     Diclofenac Sodium (VOLTAREN) 1 % gel gel; Apply 2 g topically to the appropriate area as directed 4 (Four) Times a Day As Needed (elbow and wrist pain). Apply to elbow and wrist  Dispense: 150 g; Refill: 2  -     Ambulatory Referral to Orthopedic Surgery    4. Pain of left upper extremity  -     Diclofenac Sodium (VOLTAREN) 1 % gel gel; Apply 2 g topically to the appropriate area as directed 4 (Four) Times a Day As Needed (elbow and wrist pain). Apply to elbow and wrist  Dispense: 150 g; Refill: 2  -     Ambulatory Referral to Orthopedic Surgery    5. Paresthesia of left arm  -     Diclofenac Sodium (VOLTAREN) 1 % gel gel; Apply 2 g topically to the appropriate area as directed 4 (Four) Times a Day As Needed (elbow and wrist pain). Apply to elbow and wrist  Dispense: 150 g; Refill: 2  -     Ambulatory Referral to Orthopedic Surgery    6. Left hand paresthesia  -      Diclofenac Sodium (VOLTAREN) 1 % gel gel; Apply 2 g topically to the appropriate area as directed 4 (Four) Times a Day As Needed (elbow and wrist pain). Apply to elbow and wrist  Dispense: 150 g; Refill: 2  -     Ambulatory Referral to Orthopedic Surgery    7. Left hand weakness  -     Diclofenac Sodium (VOLTAREN) 1 % gel gel; Apply 2 g topically to the appropriate area as directed 4 (Four) Times a Day As Needed (elbow and wrist pain). Apply to elbow and wrist  Dispense: 150 g; Refill: 2  -     Ambulatory Referral to Orthopedic Surgery    8. S/P decompression of ulnar nerve at elbow  -     Diclofenac Sodium (VOLTAREN) 1 % gel gel; Apply 2 g topically to the appropriate area as directed 4 (Four) Times a Day As Needed (elbow and wrist pain). Apply to elbow and wrist  Dispense: 150 g; Refill: 2  -     Ambulatory Referral to Orthopedic Surgery    Other orders  -     Cancel: XR Shoulder 2+ View Left         MEDS ORDERED DURING VISIT:  New Medications Ordered This Visit   Medications   • Diclofenac Sodium (VOLTAREN) 1 % gel gel     Sig: Apply 2 g topically to the appropriate area as directed 4 (Four) Times a Day As Needed (elbow and wrist pain). Apply to elbow and wrist     Dispense:  150 g     Refill:  2     MEDICATION ISSUES:  Discussed medication options and treatment plan of prescribed medication as well as the risks, benefits, and side effects including potential falls, possible impaired driving and metabolic adversities among others. Patient is agreeable to call the office with any worsening of symptoms or onset of side effects. Patient is agreeable to call 911 or go to the nearest ER should he/she begin having SI/HI.     Discussion:  Patient given a cock up wrist splint which did relieve some pain.  1.  Patient needs to follow-up with her surgeon Dr. Hodges for issues related to her cubital tunnel syndrome and release surgery.  She has not had any physical therapy and not had a proper follow-up since the surgery  and is having ongoing symptoms.  Requesting records    2.  Patient has carpal tunnel syndrome which is severe enough to interrupt her two-point sensation.  I have requested a copy of the EMG but by exam this is severe enough to warrant going ahead with surgery and in fact she was already recommended to have surgery for this.  She does not want her previous surgeon to do this and would like me to send her to somebody new.  We will get her set up with someone who can repair this.  If she decides to go ahead with nonsurgical treatment we would consider PT and injections.  I have given her a cock up wrist splint to wear as needed and at night for comfort.  I have given patient topical NSAID to use on the elbow and the wrist and forearm.    3.  There is possibility of cervical radicular component to her pain and I recommend follow-up for further work-up of this including a neck x-ray.  Follow-up in 2 to 3 weeks           This document has been electronically signed by Oscar Scott DO   December 15, 2022 15:37 EST    Part of this note may be an electronic transcription/translation of spoken language to printed text using the Dragon Dictation System.    Transcribed from ambient dictation for Oscar Scott DO by Gabriela Barney.  12/15/22   17:19 EST    I have personally performed the services described in this document as transcribed by the above individual, and it is both accurate and complete.

## 2022-12-19 NOTE — TELEPHONE ENCOUNTER
Office returned call and patient has not been seen at that office. Called the PCP to see if they have the EMG report. The PCP does not have a copy of EMG report.

## 2023-01-17 ENCOUNTER — OFFICE VISIT (OUTPATIENT)
Dept: ORTHOPEDIC SURGERY | Facility: CLINIC | Age: 43
End: 2023-01-17
Payer: MEDICAID

## 2023-01-17 ENCOUNTER — HOSPITAL ENCOUNTER (OUTPATIENT)
Dept: GENERAL RADIOLOGY | Facility: HOSPITAL | Age: 43
Discharge: HOME | End: 2023-01-17
Admitting: FAMILY MEDICINE
Payer: MEDICAID

## 2023-01-17 VITALS
HEIGHT: 64 IN | WEIGHT: 224 LBS | HEART RATE: 90 BPM | OXYGEN SATURATION: 98 % | SYSTOLIC BLOOD PRESSURE: 146 MMHG | DIASTOLIC BLOOD PRESSURE: 106 MMHG | BODY MASS INDEX: 38.24 KG/M2

## 2023-01-17 DIAGNOSIS — M50.30 DDD (DEGENERATIVE DISC DISEASE), CERVICAL: ICD-10-CM

## 2023-01-17 DIAGNOSIS — G56.02 CARPAL TUNNEL SYNDROME OF LEFT WRIST: ICD-10-CM

## 2023-01-17 DIAGNOSIS — M47.22 OSTEOARTHRITIS OF SPINE WITH RADICULOPATHY, CERVICAL REGION: ICD-10-CM

## 2023-01-17 DIAGNOSIS — Z98.890 S/P DECOMPRESSION OF ULNAR NERVE AT ELBOW: ICD-10-CM

## 2023-01-17 DIAGNOSIS — G56.22 CUBITAL TUNNEL SYNDROME ON LEFT: ICD-10-CM

## 2023-01-17 DIAGNOSIS — M48.02 NEURAL FORAMINAL STENOSIS OF CERVICAL SPINE: ICD-10-CM

## 2023-01-17 DIAGNOSIS — M54.2 NECK PAIN: ICD-10-CM

## 2023-01-17 DIAGNOSIS — R29.898 LEFT HAND WEAKNESS: ICD-10-CM

## 2023-01-17 DIAGNOSIS — M54.2 NECK PAIN: Primary | ICD-10-CM

## 2023-01-17 PROCEDURE — 99214 OFFICE O/P EST MOD 30 MIN: CPT | Performed by: FAMILY MEDICINE

## 2023-01-17 PROCEDURE — 72050 X-RAY EXAM NECK SPINE 4/5VWS: CPT | Performed by: RADIOLOGY

## 2023-01-17 PROCEDURE — 72050 X-RAY EXAM NECK SPINE 4/5VWS: CPT

## 2023-01-17 RX ORDER — DICLOFENAC SODIUM 25 MG/1
25-50 TABLET, DELAYED RELEASE ORAL 2 TIMES DAILY PRN
Qty: 60 TABLET | Refills: 1 | Status: SHIPPED | OUTPATIENT
Start: 2023-01-17 | End: 2023-02-21

## 2023-01-17 NOTE — PROGRESS NOTES
Follow Up Visit      Patient: Sherrill Nathan  YOB: 1980  Date of Encounter: 01/17/2023  PCP: Kreis, Samuel Duane, MD  Referring Provider: No ref. provider found     Subjective   Sherrill Nathan is a 42 y.o. female who presents to the office today for evaluation of Pain, Numbness, and Tingling of the Cervical Spine (New Problem, Stiffness and popping)      Chief Complaint   Patient presents with   • Cervical Spine - Pain, Numbness, Tingling     New Problem, Stiffness and popping       HPI     Patient presents for follow-up for further evaluation for neck pain with radicular symptoms as well as left arm pain and numbness of the of the hand related to carpal tunnel and cubital tunnel syndrome. Patient has not been able to get an appointment with Dr. Hodges yet. She states that she is having difficulty communicating with them. Patient has been using Voltaren gel and wearing her wrist brace which is not providing much relief. Continues to have neck pain which was related to an accident from last year which resolved for several months but has now come back. He has notable pain particularly and popping particularly when turning head to the left, tilting to the left which does create pain down the left arm.    Answers for HPI/ROS submitted by the patient on 1/10/2023  Please describe your symptoms.: Numbness in left hand, extreme pain in left elbow.. had surgery on left elbow before. Unable to hold things firmly in left hand  Have you had these symptoms before?: Yes  How long have you been having these symptoms?: Greater than 2 weeks  Please list any medications you are currently taking for this condition.: Topical gel rub  Please describe any probable cause for these symptoms. : Was diagnosed with cubital tunnel and carpal tunnel syndrome  What is the primary reason for your visit?: Other      There is no problem list on file for this patient.      Past Medical History:   Diagnosis Date   • Acid reflux    • Arthritis   "  • Asthma    • Claustrophobia    • Diabetes (HCC)    • High blood pressure    • High cholesterol    • Migraines    • Osteoarthritis    • Sleep apnea        Allergies   Allergen Reactions   • Amoxicillin Anaphylaxis and Hives   • Penicillins Anaphylaxis and Hives   • Sulfa Antibiotics Anaphylaxis and Hives       Review of Systems   Constitutional: Positive for activity change. Negative for fever.   Respiratory: Negative for shortness of breath and wheezing.    Cardiovascular: Negative for chest pain.   Musculoskeletal: Positive for arthralgias and myalgias.   Skin: Negative for color change and wound.   Neurological: Positive for weakness and numbness.       Visit Vitals  BP (!) 146/106   Pulse 90   Ht 162.6 cm (64\")   Wt 102 kg (224 lb)   SpO2 98%   BMI 38.45 kg/m²     42 y.o.female  Physical Exam  Vitals and nursing note reviewed.   Constitutional:       General: She is not in acute distress.     Appearance: Normal appearance.   Pulmonary:      Effort: Pulmonary effort is normal. No respiratory distress.   Skin:     General: Skin is warm and dry.      Findings: No erythema.   Neurological:      General: No focal deficit present.      Mental Status: She is alert.      Sensory: No sensory deficit.      Motor: No weakness.     Ortho exam:    C-spine:  No bony tenderness.  Tender left sided paraspinals.  Very restricted left side bend and rotation with pain and a positive left Spurling, radiating pain all the way down the arm to the wrist.    Left upper extremity:  Left elbow and forearm are still generally tender.  Left wrist is tender with pain on end range of motion, restricted flexion and extension.  Notable weakness of  and pinch and intrinsic strength.  Positive Tinel, Phalen, and carpal tunnel compression test which recreate paresthesias of the entire hand.  Patient was shown to have abnormal two-point sensation on previous exams which was not repeated today.    Radiology Results:    XR Spine Cervical " Complete 4 or 5 View    Result Date: 1/17/2023    Multilevel degenerative changes with neural foraminal narrowing.  This report was finalized on 1/17/2023 2:08 PM by Dr. Kvng Brooks MD.      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 Neck pain (Primary)  -     XR Spine Cervical Complete 4 or 5 View; Future  -     diclofenac (VOLTAREN) 25 MG EC tablet; Take 1-2 tablets by mouth 2 (Two) Times a Day As Needed (neck and arm pain).  Dispense: 60 tablet; Refill: 1    2. DDD (degenerative disc disease), cervical  -     diclofenac (VOLTAREN) 25 MG EC tablet; Take 1-2 tablets by mouth 2 (Two) Times a Day As Needed (neck and arm pain).  Dispense: 60 tablet; Refill: 1    3. Osteoarthritis of spine with radiculopathy, cervical region  -     diclofenac (VOLTAREN) 25 MG EC tablet; Take 1-2 tablets by mouth 2 (Two) Times a Day As Needed (neck and arm pain).  Dispense: 60 tablet; Refill: 1    4. Neural foraminal stenosis of cervical spine  -     diclofenac (VOLTAREN) 25 MG EC tablet; Take 1-2 tablets by mouth 2 (Two) Times a Day As Needed (neck and arm pain).  Dispense: 60 tablet; Refill: 1    5. Carpal tunnel syndrome of left wrist  -     diclofenac (VOLTAREN) 25 MG EC tablet; Take 1-2 tablets by mouth 2 (Two) Times a Day As Needed (neck and arm pain).  Dispense: 60 tablet; Refill: 1    6. Cubital tunnel syndrome on left  -     diclofenac (VOLTAREN) 25 MG EC tablet; Take 1-2 tablets by mouth 2 (Two) Times a Day As Needed (neck and arm pain).  Dispense: 60 tablet; Refill: 1    7. S/P decompression of ulnar nerve at elbow  -     diclofenac (VOLTAREN) 25 MG EC tablet; Take 1-2 tablets by mouth 2 (Two) Times  a Day As Needed (neck and arm pain).  Dispense: 60 tablet; Refill: 1    8. Left hand weakness  -     diclofenac (VOLTAREN) 25 MG EC tablet; Take 1-2 tablets by mouth 2 (Two) Times a Day As Needed (neck and arm pain).  Dispense: 60 tablet; Refill: 1         MEDS ORDERED DURING VISIT:  New Medications Ordered This Visit   Medications   • diclofenac (VOLTAREN) 25 MG EC tablet     Sig: Take 1-2 tablets by mouth 2 (Two) Times a Day As Needed (neck and arm pain).     Dispense:  60 tablet     Refill:  1     MEDICATION ISSUES:  Discussed medication options and treatment plan of prescribed medication as well as the risks, benefits, and side effects including potential falls, possible impaired driving and metabolic adversities among others. Patient is agreeable to call the office with any worsening of symptoms or onset of side effects. Patient is agreeable to call 911 or go to the nearest ER should he/she begin having SI/HI.     Discussion:  Neck x-rays showed degenerative changes, spurring, and foraminal stenosis. I suspect that the patient is getting a radicular component of the symptoms down her arm. I have given her home exercises for the neck and for carpal tunnel syndrome, however, she will have to watch and make sure that these carpal tunnel exercises do not exacerbate her elbow pain and if they do, she may have to back off on them. Would consider formal physical therapy and occupational therapy for these issues. Patient has not been taking any oral non-steroidal anti-inflammatory drugs due to gastrointestinal upset. We will try oral enteric coated diclofenac at a low dose to see if she can tolerate it and to see if this gives her any relief. I did finally receive an EMG and records from Dr. Hodges's office which did show mild carpal tunnel syndrome on the left which per his note was apparently not bothering her on a constant basis at the time of surgical decision, so carpal tunnel release was not performed with the  cubital tunnel release. We are still trying to get the patient in to discuss her carpal tunnel with a different orthopedic surgeon per her request and we will try to facilitate getting her back in to see Dr. Hodges for the elbow/cubital tunnel issues. Patient will follow up in 1 month or sooner if needed. Would likely consider steroid injection or formal physical therapy for the wrist and the neck if she is not improving or if surgery is not desired for the wrist.               This document has been electronically signed by Oscar Scott DO   January 17, 2023 15:55 EST    Dictated Utilizing Dragon Dictation: Part of this note may be an electronic transcription/translation of spoken language to printed text using the Dragon Dictation System.    Transcribed from ambient dictation for Oscar Scott DO by Gabriela Barney.  01/17/23   17:44 EST    I have personally performed the services described in this document as transcribed by the above individual, and it is both accurate and complete.

## 2023-01-18 ENCOUNTER — TELEPHONE (OUTPATIENT)
Dept: ORTHOPEDIC SURGERY | Facility: CLINIC | Age: 43
End: 2023-01-18
Payer: MEDICAID

## 2023-01-18 NOTE — TELEPHONE ENCOUNTER
Fax sent to Dr. Hodges's office informing them that the patient has been trying to reach them about her elbow.

## 2023-02-21 ENCOUNTER — OFFICE VISIT (OUTPATIENT)
Dept: ORTHOPEDIC SURGERY | Facility: CLINIC | Age: 43
End: 2023-02-21
Payer: MEDICAID

## 2023-02-21 VITALS
BODY MASS INDEX: 38.24 KG/M2 | SYSTOLIC BLOOD PRESSURE: 119 MMHG | WEIGHT: 224 LBS | HEART RATE: 87 BPM | HEIGHT: 64 IN | OXYGEN SATURATION: 96 % | DIASTOLIC BLOOD PRESSURE: 87 MMHG

## 2023-02-21 DIAGNOSIS — R29.898 LEFT HAND WEAKNESS: ICD-10-CM

## 2023-02-21 DIAGNOSIS — M47.22 OSTEOARTHRITIS OF SPINE WITH RADICULOPATHY, CERVICAL REGION: ICD-10-CM

## 2023-02-21 DIAGNOSIS — G56.22 CUBITAL TUNNEL SYNDROME ON LEFT: ICD-10-CM

## 2023-02-21 DIAGNOSIS — M50.30 DDD (DEGENERATIVE DISC DISEASE), CERVICAL: ICD-10-CM

## 2023-02-21 DIAGNOSIS — Z98.890 S/P DECOMPRESSION OF ULNAR NERVE AT ELBOW: ICD-10-CM

## 2023-02-21 DIAGNOSIS — G56.02 CARPAL TUNNEL SYNDROME OF LEFT WRIST: ICD-10-CM

## 2023-02-21 DIAGNOSIS — M48.02 NEURAL FORAMINAL STENOSIS OF CERVICAL SPINE: ICD-10-CM

## 2023-02-21 DIAGNOSIS — M54.2 NECK PAIN: ICD-10-CM

## 2023-02-21 PROCEDURE — 99214 OFFICE O/P EST MOD 30 MIN: CPT | Performed by: FAMILY MEDICINE

## 2023-02-21 RX ORDER — DICLOFENAC SODIUM 75 MG/1
75 TABLET, DELAYED RELEASE ORAL 2 TIMES DAILY PRN
Qty: 60 TABLET | Refills: 1 | Status: SHIPPED | OUTPATIENT
Start: 2023-02-21

## 2023-02-21 RX ORDER — TRAZODONE HYDROCHLORIDE 100 MG/1
TABLET ORAL
COMMUNITY
Start: 2023-02-09

## 2023-02-21 RX ORDER — SEMAGLUTIDE 1.34 MG/ML
INJECTION, SOLUTION SUBCUTANEOUS
COMMUNITY
Start: 2023-01-27

## 2023-02-21 NOTE — PROGRESS NOTES
"Follow Up Visit      Patient: Sherrill Nathan  YOB: 1980  Date of Encounter: 02/21/2023  PCP: Kreis, Samuel Duane, MD  Referring Provider: No ref. provider found     Subjective   Sherrill Nathan is a 42 y.o. female who presents to the office today for evaluation of Pain and Follow-up of the Cervical Spine      Chief Complaint   Patient presents with   • Cervical Spine - Pain, Follow-up       HPI   The patient presents for follow-up for neck pain. Her cervical spine has been bothering significantly. She has significant pain and spasm when turning her head in any direction which seems to be worsening. The patient has tried home exercises which do not seem to be helping what little she can stand to do. Diclofenac oral also does not seem to be helping at the 50 mg dose twice daily. The patient also notes that she has been in to see her elbow surgeon, Dr. Hodges, who has ordered another EMG, and she is also scheduled to see Dr. Rosales to be evaluated for carpal tunnel surgery in the near future.      There is no problem list on file for this patient.      Past Medical History:   Diagnosis Date   • Acid reflux    • Arthritis    • Asthma    • Claustrophobia    • Diabetes (HCC)    • High blood pressure    • High cholesterol    • Migraines    • Osteoarthritis    • Sleep apnea        Allergies   Allergen Reactions   • Amoxicillin Anaphylaxis and Hives   • Penicillins Anaphylaxis and Hives   • Sulfa Antibiotics Anaphylaxis and Hives       Review of Systems   Constitutional: Positive for activity change. Negative for fever.   Respiratory: Negative for shortness of breath and wheezing.    Cardiovascular: Negative for chest pain.   Musculoskeletal: Positive for arthralgias, myalgias, neck pain and neck stiffness.   Skin: Negative for color change and wound.   Neurological: Negative for weakness and numbness.       Visit Vitals  /87   Pulse 87   Ht 162.6 cm (64\")   Wt 102 kg (224 lb)   SpO2 96%   BMI 38.45 kg/m²     42 " y.o.female  Physical Exam  Vitals and nursing note reviewed.   Constitutional:       General: She is not in acute distress.     Appearance: Normal appearance.   Pulmonary:      Effort: Pulmonary effort is normal. No respiratory distress.   Musculoskeletal:      Comments: C-spine:  Generally tender with restricted range of motion and pain.  Tender on bony spine as well as paraspinals with muscle spasm, worse on the right side.  Positive Spurling on the left.  Pain radiating down the lower extremity.   Skin:     General: Skin is warm and dry.      Findings: No erythema.   Neurological:      General: No focal deficit present.      Mental Status: She is alert.      Sensory: No sensory deficit.      Motor: No weakness.         Radiology Results:    XR Chest 1 View    Result Date: 2/5/2023  Possible bronchitis. Images personally reviewed, interpreted and dictated by BRYCE Potter M.D.      Assessment & Plan   Diagnoses and all orders for this visit:    1. Neck pain  -     diclofenac (VOLTAREN) 75 MG EC tablet; Take 1 tablet by mouth 2 (Two) Times a Day As Needed (neck and arm pain).  Dispense: 60 tablet; Refill: 1  -     Ambulatory Referral to Physical Therapy Evaluate and treat    2. DDD (degenerative disc disease), cervical  -     diclofenac (VOLTAREN) 75 MG EC tablet; Take 1 tablet by mouth 2 (Two) Times a Day As Needed (neck and arm pain).  Dispense: 60 tablet; Refill: 1  -     Ambulatory Referral to Physical Therapy Evaluate and treat    3. Osteoarthritis of spine with radiculopathy, cervical region  -     diclofenac (VOLTAREN) 75 MG EC tablet; Take 1 tablet by mouth 2 (Two) Times a Day As Needed (neck and arm pain).  Dispense: 60 tablet; Refill: 1  -     Ambulatory Referral to Physical Therapy Evaluate and treat    4. Neural foraminal stenosis of cervical spine  -     diclofenac (VOLTAREN) 75 MG EC tablet; Take 1 tablet by mouth 2 (Two) Times a Day As Needed (neck and arm pain).  Dispense: 60 tablet; Refill: 1  -      Ambulatory Referral to Physical Therapy Evaluate and treat    5. Carpal tunnel syndrome of left wrist  -     diclofenac (VOLTAREN) 75 MG EC tablet; Take 1 tablet by mouth 2 (Two) Times a Day As Needed (neck and arm pain).  Dispense: 60 tablet; Refill: 1    6. Cubital tunnel syndrome on left  -     diclofenac (VOLTAREN) 75 MG EC tablet; Take 1 tablet by mouth 2 (Two) Times a Day As Needed (neck and arm pain).  Dispense: 60 tablet; Refill: 1    7. S/P decompression of ulnar nerve at elbow  -     diclofenac (VOLTAREN) 75 MG EC tablet; Take 1 tablet by mouth 2 (Two) Times a Day As Needed (neck and arm pain).  Dispense: 60 tablet; Refill: 1    8. Left hand weakness  -     diclofenac (VOLTAREN) 75 MG EC tablet; Take 1 tablet by mouth 2 (Two) Times a Day As Needed (neck and arm pain).  Dispense: 60 tablet; Refill: 1         MEDS ORDERED DURING VISIT:  New Medications Ordered This Visit   Medications   • diclofenac (VOLTAREN) 75 MG EC tablet     Sig: Take 1 tablet by mouth 2 (Two) Times a Day As Needed (neck and arm pain).     Dispense:  60 tablet     Refill:  1     MEDICATION ISSUES:  Discussed medication options and treatment plan of prescribed medication as well as the risks, benefits, and side effects including potential falls, possible impaired driving and metabolic adversities among others. Patient is agreeable to call the office with any worsening of symptoms or onset of side effects. Patient is agreeable to call 911 or go to the nearest ER should he/she begin having SI/HI.     Discussion:    Patient is not responding to home exercises. I recommend that we progress to physical therapy, and I also believe that the patient would benefit from osteopathic treatments. Increasing diclofenac to 75 mg twice daily. The patient will follow up with me for osteopathic evaluation and management.             This document has been electronically signed by Oscar Scott DO   February 22, 2023 22:46 EST    Dictated Utilizing  Dragon Dictation: Part of this note may be an electronic transcription/translation of spoken language to printed text using the Dragon Dictation System.    Transcribed from ambient dictation for Oscar Scott DO by Yenifer Neumann.  02/21/23   18:05 EST    I have personally performed the services described in this document as transcribed by the above individual, and it is both accurate and complete.

## 2023-02-24 ENCOUNTER — TELEPHONE (OUTPATIENT)
Dept: PHYSICAL THERAPY | Facility: CLINIC | Age: 43
End: 2023-02-24
Payer: MEDICAID

## 2023-02-27 ENCOUNTER — OFFICE VISIT (OUTPATIENT)
Dept: ORTHOPEDIC SURGERY | Facility: CLINIC | Age: 43
End: 2023-02-27
Payer: MEDICAID

## 2023-02-27 VITALS — WEIGHT: 224.87 LBS | HEIGHT: 64 IN | BODY MASS INDEX: 38.39 KG/M2

## 2023-02-27 DIAGNOSIS — R20.2 LEFT HAND PARESTHESIA: Primary | ICD-10-CM

## 2023-02-27 PROCEDURE — 99213 OFFICE O/P EST LOW 20 MIN: CPT | Performed by: ORTHOPAEDIC SURGERY

## 2023-03-13 ENCOUNTER — TREATMENT (OUTPATIENT)
Dept: PHYSICAL THERAPY | Facility: CLINIC | Age: 43
End: 2023-03-13
Payer: MEDICAID

## 2023-03-13 DIAGNOSIS — M54.2 PAIN, NECK: Primary | ICD-10-CM

## 2023-03-13 DIAGNOSIS — M50.30 DDD (DEGENERATIVE DISC DISEASE), CERVICAL: ICD-10-CM

## 2023-03-13 DIAGNOSIS — M48.02 NEURAL FORAMINAL STENOSIS OF CERVICAL SPINE: ICD-10-CM

## 2023-03-13 PROCEDURE — 97162 PT EVAL MOD COMPLEX 30 MIN: CPT | Performed by: PHYSICAL THERAPIST
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
## 2023-03-14 ENCOUNTER — TELEPHONE (OUTPATIENT)
Dept: ORTHOPEDIC SURGERY | Facility: CLINIC | Age: 43
End: 2023-03-14
Payer: MEDICAID

## 2023-03-14 ENCOUNTER — PROCEDURE VISIT (OUTPATIENT)
Dept: ORTHOPEDIC SURGERY | Facility: CLINIC | Age: 43
End: 2023-03-14
Payer: MEDICAID

## 2023-03-14 VITALS
HEIGHT: 64 IN | HEART RATE: 89 BPM | DIASTOLIC BLOOD PRESSURE: 104 MMHG | BODY MASS INDEX: 38.07 KG/M2 | OXYGEN SATURATION: 98 % | SYSTOLIC BLOOD PRESSURE: 157 MMHG | WEIGHT: 223 LBS

## 2023-03-14 DIAGNOSIS — M47.22 OSTEOARTHRITIS OF SPINE WITH RADICULOPATHY, CERVICAL REGION: Primary | ICD-10-CM

## 2023-03-14 DIAGNOSIS — G56.22 CUBITAL TUNNEL SYNDROME ON LEFT: ICD-10-CM

## 2023-03-14 DIAGNOSIS — M54.2 NECK PAIN: ICD-10-CM

## 2023-03-14 DIAGNOSIS — R29.898 LEFT HAND WEAKNESS: ICD-10-CM

## 2023-03-14 DIAGNOSIS — M25.522 LEFT ELBOW PAIN: ICD-10-CM

## 2023-03-14 DIAGNOSIS — Z98.890 S/P DECOMPRESSION OF ULNAR NERVE AT ELBOW: ICD-10-CM

## 2023-03-14 DIAGNOSIS — R20.2 LEFT HAND PARESTHESIA: ICD-10-CM

## 2023-03-14 DIAGNOSIS — M99.01 SEGMENTAL AND SOMATIC DYSFUNCTION OF CERVICAL REGION: ICD-10-CM

## 2023-03-14 DIAGNOSIS — M48.02 NEURAL FORAMINAL STENOSIS OF CERVICAL SPINE: ICD-10-CM

## 2023-03-14 DIAGNOSIS — G56.02 CARPAL TUNNEL SYNDROME OF LEFT WRIST: ICD-10-CM

## 2023-03-14 DIAGNOSIS — M50.30 DDD (DEGENERATIVE DISC DISEASE), CERVICAL: ICD-10-CM

## 2023-03-14 PROCEDURE — 98927 OSTEOPATH MANJ 5-6 REGIONS: CPT | Performed by: FAMILY MEDICINE

## 2023-03-14 PROCEDURE — 99213 OFFICE O/P EST LOW 20 MIN: CPT | Performed by: FAMILY MEDICINE

## 2023-03-14 NOTE — TELEPHONE ENCOUNTER
Called and requested that the EMG results from 03/2023 be faxed to our office. Breana Raymond MA

## 2023-03-14 NOTE — PROGRESS NOTES
"Follow Up Visit    Patient: Sherrill Nathan  YOB: 1980  Date of Encounter: 03/14/2023  PCP: Priti Ley  Referring Provider: No ref. provider found     Subjective   Sherrill Nathan is a 42 y.o. female who presents to the office today for evaluation of Follow-up and Pain of the Neck (OMT)      Chief Complaint   Patient presents with   • Neck - Follow-up, Pain     OMT       HPI   The patient presents for follow-up of chronic neck pain and radicular symptoms. She has only had one visit with physical therapy, and she cannot tell if it is going to help yet. Increased diclofenac was not helpful. She had a new EMG from Dr. Hodges, and she is waiting on the results. The patient desires further treatment.      There is no problem list on file for this patient.      Past Medical History:   Diagnosis Date   • Acid reflux    • Arthritis    • Arthritis of back    • Arthritis of neck 2/27/23   • Asthma    • Cervical disc disorder    • Claustrophobia    • CTS (carpal tunnel syndrome)    • Diabetes (HCC)    • High blood pressure    • High cholesterol    • Hip arthrosis    • Lumbosacral disc disease    • Migraines    • Neck strain    • Osteoarthritis    • Sleep apnea    • Thoracic disc disorder        Allergies   Allergen Reactions   • Amoxicillin Anaphylaxis and Hives   • Penicillins Anaphylaxis and Hives   • Sulfa Antibiotics Anaphylaxis and Hives       Review of Systems   Constitutional: Positive for activity change. Negative for fever.   Respiratory: Negative for shortness of breath and wheezing.    Cardiovascular: Negative for chest pain.   Musculoskeletal: Positive for arthralgias and myalgias.   Skin: Negative for color change and wound.   Neurological: Positive for weakness and numbness.       Visit Vitals  BP (!) 157/104 (BP Location: Right arm, Patient Position: Sitting, Cuff Size: Adult)   Pulse 89   Ht 162.6 cm (64.02\")   Wt 101 kg (223 lb)   SpO2 98%   BMI 38.26 kg/m²     42 y.o.female  Physical Exam  Vitals and " nursing note reviewed.   Constitutional:       General: She is not in acute distress.     Appearance: Normal appearance.   Pulmonary:      Effort: Pulmonary effort is normal. No respiratory distress.   Musculoskeletal:      Comments: Cervical spine exam:  Tender lower C-spine with paraspinal spasm.  Pain on any movement.  Pain on Spurling maneuver bilaterally radiating down to the tops of the shoulders.  Restricted range of motion generally.   Skin:     General: Skin is warm and dry.      Findings: No erythema.   Neurological:      General: No focal deficit present.      Mental Status: She is alert.      Sensory: No sensory deficit.      Motor: No weakness.         Radiology Results:    No radiology results for the last 30 days.    EMG left upper extremity 3-2-23  Mild primarily motor demyelinating neuropathy affecting the left median nerve at or about the wrist (carpal tunnel syndrome)    OMT Procedure  Indications: TART findings, muscle spasm, pain    Risks and benefits discussed and patient gave verbal consent to treat    Regions treated: Head, C-Spine, Ribs, Upper Extremity and T- Spine    Findings: Head Suboccipital restriction, Cervical Spine Generalized cervical motion restriction, Suboccipital restriction, C4 ERS L, C5 FRS L, C6 FRS R or C7 ERS R, Upper Extremity Left shoulder restriction, Ribs 1-2 Left exhaled or Thoracic Spine Generalized Thoracic Restriction    Modalities: Muscle Energy, Direct Myofascial Release, Indirect Myofascial Release and Still Technique    Patient reports decreased pain, improved range of motion, and improved functionality after treatment. There were no adverse effects.      Assessment & Plan   Diagnoses and all orders for this visit:    1. Osteoarthritis of spine with radiculopathy, cervical region (Primary)    2. Neural foraminal stenosis of cervical spine    3. Neck pain    4. DDD (degenerative disc disease), cervical    5. Left hand paresthesia    6. Carpal tunnel syndrome of  left wrist    7. Cubital tunnel syndrome on left    8. S/P decompression of ulnar nerve at elbow    9. Left hand weakness    10. Left elbow pain    11. Segmental and somatic dysfunction of cervical region         MEDS ORDERED DURING VISIT:  No orders of the defined types were placed in this encounter.    MEDICATION ISSUES:  Discussed medication options and treatment plan of prescribed medication as well as the risks, benefits, and side effects including potential falls, possible impaired driving and metabolic adversities among others. Patient is agreeable to call the office with any worsening of symptoms or onset of side effects. Patient is agreeable to call 911 or go to the nearest ER should he/she begin having SI/HI.     Discussion:  EMG requested and reviewed showing ongoing left mild carpal tunnel syndrome with no cervical nerve root involvement noted.    Take your BP meds when you get home and recheck blood pressure to make sure it is coming down. Notify pcp if it remains elevated .    The patient did not have any immediate relief from OMT today in the office. We will reserve judgment until she has had a few days for it to set in. Consider further treatments depending on results. We will request the patient's new EMG results. Recommend that the patient continue physical therapy. Consider changing diclofenac to a different medication, although the patient is prone to GI problems from NSAIDs. Follow up in 2 weeks for further osteopathic evaluation and management.  Consider MRI if not improving.  EMG did not show any cervical nerve root involvement on the left.         This document has been electronically signed by Oscar Scott DO   March 14, 2023 13:55 EDT    Part of this note may be an electronic transcription/translation of spoken language to printed text using the Dragon Dictation System.    Transcribed from ambient dictation for Oscar Scott DO by Yenifer Neumann.  03/14/23   14:41 EDT    I have  personally performed the services described in this document as transcribed by the above individual, and it is both accurate and complete.

## 2023-03-22 NOTE — DISCHARGE INSTRUCTIONS
TAKE the following medications the morning of surgery:  3/27/2023      All heart or blood pressure medications    Please discontinue all blood thinners and anticoagulants (except aspirin) prior to surgery as per your surgeon and cardiologist instructions.  Aspirin may be continued up to the day prior to surgery.    HOLD all diabetic medications the morning of surgery as order by physician.    Please follow instructions on use of prep cloths provided by nurse. Return instruction sheet to pre-op nurse on day of surgery.    Arrival time for surgery on   will be given to you by Dr. Osborne.    A RESPONSIBLE PERSON MUST REMAIN IN THE WAITING ROOM DURING YOUR PROCEDURE AND A RESPONSIBLE  MUST BE AVAILABLE UPON YOUR DISCHARGE.    General Instructions:  3/26/2023   Do NOT eat or drink after midnight which includes water, mints, or gum.  You may brush your teeth. Dental appliances that are removable must be taken out day of surgery.  Do NOT smoke, chew tobacco, or drink alcohol within 24 hours prior to surgery.  Bring medications in original bottles, any inhalers and if applicable your C-PAP/BI-PAP machine  Bring any papers given to you in the doctor’s office  Wear clean, comfortable clothes and socks  Do NOT wear contact lenses or make-up or dark nail polish.  Bring a case for your glasses if applicable.  Bring crutches or walker if applicable  Leave all other valuables and jewelry at home  If you were given a blood bank armband, continue to wear it until discharged.    Preventing a Surgical Site Infection:  Shower the night before surgery (unless instructed otherwise) using a fresh bar of anti-bacterial soap (such as Dial) and clean washcloth.  Dry with a clean towel and dress in clean clothing.  For 2 to 3 days before surgery, avoid shaving with a razor near where you will have surgery because the razor can irritate skin and make it easier to develop an infection.  Ask your surgeon if you will be receiving  antibiotics prior to surgery.  Make sure you, your family, and all healthcare providers clean their hands with soap and water or an alcohol-based hand  before caring for you or your wound.  If at all possible, quit smoking as many days before surgery as you can.    Day of Surgery:  Upon arrival, a pre-op nurse and anesthesiologist will review your health history, obtain vital signs, and answer questions you may have.  The only belongings needed at this time will be your home medications and if applicable you C-PAP/BI-PAP machine.  If you are staying overnight, your family can leave the rest of your belongings in the car and bring them to your room later.  A pre-op nurse will start an IV and you may receive medication in preparation for surgery.  Due to patient privacy and limited space, only one member of your family will be able to accompany you in the pre-op area.  While you are in surgery your family should notify the waiting room  if they leave the waiting room area and provide a contact number.  Please be aware that surgery does come with discomfort.  We want to make every effort to control your discomfort so please discuss any uncontrolled symptoms with your nurse.  Your doctor will most likely have prescribed pain medications.  If you are going home after surgery you will receive individualized written care instructions before being discharged.  A responsible adult must drive you to and from the hospital on the day of surgery and stay with you for 24 hours.  If you are staying overnight following surgery, you will be transported to your hospital room following the recovery period.

## 2023-03-23 ENCOUNTER — TREATMENT (OUTPATIENT)
Dept: PHYSICAL THERAPY | Facility: CLINIC | Age: 43
End: 2023-03-23
Payer: MEDICAID

## 2023-03-23 DIAGNOSIS — M54.2 PAIN, NECK: Primary | ICD-10-CM

## 2023-03-23 DIAGNOSIS — M50.30 DDD (DEGENERATIVE DISC DISEASE), CERVICAL: ICD-10-CM

## 2023-03-23 DIAGNOSIS — M48.02 NEURAL FORAMINAL STENOSIS OF CERVICAL SPINE: ICD-10-CM

## 2023-03-23 PROCEDURE — 97014 ELECTRIC STIMULATION THERAPY: CPT | Performed by: PHYSICAL THERAPIST

## 2023-03-23 PROCEDURE — 97140 MANUAL THERAPY 1/> REGIONS: CPT | Performed by: PHYSICAL THERAPIST

## 2023-03-23 PROCEDURE — 97110 THERAPEUTIC EXERCISES: CPT | Performed by: PHYSICAL THERAPIST

## 2023-03-23 NOTE — PROGRESS NOTES
"Physical Therapy Daily Treatment Note      Patient: Sherrill Nathan   : 1980  Referring practitioner: Oscar Scott DO  Date of Initial Visit: Type: THERAPY  Noted: 3/13/2023  Today's Date: 3/23/2023  Patient seen for 2 sessions       Visit Diagnoses:    ICD-10-CM ICD-9-CM   1. Pain, neck  M54.2 723.1   2. DDD (degenerative disc disease), cervical  M50.30 722.4   3. Neural foraminal stenosis of cervical spine  M48.02 723.0       Subjective Evaluation    History of Present Illness    Subjective comment: Pt reports 10/10 neck and left shoulder pain prior to today's session. She states, \"it just does that sometimes, it is more common than not to be 10/10\".Pain  Current pain rating: 10           Objective   See Exercise, Manual, and Modality Logs for complete treatment.       Assessment & Plan     Assessment    Assessment details: Today's treatment session was initiated with STM to the left UT to address muscle tightness, with tenderness reported. Therapeutic exercises were performed for improved cervical ROM and scapular stability, with patient instructed to perform in the pain-free range. Tactile and verbal cues were provided for proper form. Rest breaks were provided as needed secondary to fatigue. Today's session concluded with MH combined with IFC to the left UT to address pain and muscle tightness, with no skin irritation observed. The patient reported 7.5/10 cervical pain following today's session.    Plan  Plan details: Progress as tolerated for improved functional mobility and independence.          Timed:         Manual Therapy:    10     mins  06090;     Therapeutic Exercise:    16     mins  31143;     Neuromuscular Sierra:        mins  96304;    Therapeutic Activity:          mins  23725;     Gait Training:           mins  92620;     Ultrasound:          mins  48357;    Ionto                                   mins   04991  Self Care                            mins   53927  Canalith Repos         mins " 57928      Un-Timed:  Electrical Stimulation:    10     mins  31104 ( );  Dry Needling          mins self-pay  Traction          mins 56213      Timed Treatment:   26   mins   Total Treatment:     36   mins    Ashley Claudene Dalton, PT  [place] License: [id number]

## 2023-03-24 ENCOUNTER — PRE-ADMISSION TESTING (OUTPATIENT)
Dept: PREADMISSION TESTING | Facility: HOSPITAL | Age: 43
End: 2023-03-24
Payer: MEDICAID

## 2023-03-24 ENCOUNTER — ANESTHESIA EVENT (OUTPATIENT)
Dept: PERIOP | Facility: HOSPITAL | Age: 43
End: 2023-03-24
Payer: MEDICAID

## 2023-03-24 LAB
ANION GAP SERPL CALCULATED.3IONS-SCNC: 11.6 MMOL/L (ref 5–15)
BUN SERPL-MCNC: 15 MG/DL (ref 6–20)
BUN/CREAT SERPL: 15 (ref 7–25)
CALCIUM SPEC-SCNC: 9.5 MG/DL (ref 8.6–10.5)
CHLORIDE SERPL-SCNC: 106 MMOL/L (ref 98–107)
CO2 SERPL-SCNC: 25.4 MMOL/L (ref 22–29)
CREAT SERPL-MCNC: 1 MG/DL (ref 0.57–1)
DEPRECATED RDW RBC AUTO: 41.8 FL (ref 37–54)
EGFRCR SERPLBLD CKD-EPI 2021: 71.8 ML/MIN/1.73
ERYTHROCYTE [DISTWIDTH] IN BLOOD BY AUTOMATED COUNT: 11.9 % (ref 12.3–15.4)
GLUCOSE SERPL-MCNC: 71 MG/DL (ref 65–99)
HCT VFR BLD AUTO: 45.1 % (ref 34–46.6)
HGB BLD-MCNC: 14.4 G/DL (ref 12–15.9)
MCH RBC QN AUTO: 30.7 PG (ref 26.6–33)
MCHC RBC AUTO-ENTMCNC: 31.9 G/DL (ref 31.5–35.7)
MCV RBC AUTO: 96.2 FL (ref 79–97)
PLATELET # BLD AUTO: 330 10*3/MM3 (ref 140–450)
PMV BLD AUTO: 10.2 FL (ref 6–12)
POTASSIUM SERPL-SCNC: 4.3 MMOL/L (ref 3.5–5.2)
QT INTERVAL: 398 MS
QTC INTERVAL: 467 MS
RBC # BLD AUTO: 4.69 10*6/MM3 (ref 3.77–5.28)
SODIUM SERPL-SCNC: 143 MMOL/L (ref 136–145)
WBC NRBC COR # BLD: 12.51 10*3/MM3 (ref 3.4–10.8)

## 2023-03-24 PROCEDURE — 36415 COLL VENOUS BLD VENIPUNCTURE: CPT

## 2023-03-24 PROCEDURE — 85027 COMPLETE CBC AUTOMATED: CPT

## 2023-03-24 PROCEDURE — 80048 BASIC METABOLIC PNL TOTAL CA: CPT

## 2023-03-24 PROCEDURE — 93005 ELECTROCARDIOGRAM TRACING: CPT

## 2023-03-27 ENCOUNTER — HOSPITAL ENCOUNTER (OUTPATIENT)
Facility: HOSPITAL | Age: 43
Setting detail: HOSPITAL OUTPATIENT SURGERY
Discharge: HOME | End: 2023-03-27
Attending: GENERAL PRACTICE | Admitting: GENERAL PRACTICE
Payer: MEDICAID

## 2023-03-27 ENCOUNTER — ANESTHESIA (OUTPATIENT)
Dept: PERIOP | Facility: HOSPITAL | Age: 43
End: 2023-03-27
Payer: MEDICAID

## 2023-03-27 VITALS
DIASTOLIC BLOOD PRESSURE: 88 MMHG | OXYGEN SATURATION: 100 % | HEART RATE: 72 BPM | WEIGHT: 220 LBS | BODY MASS INDEX: 37.56 KG/M2 | TEMPERATURE: 97.8 F | SYSTOLIC BLOOD PRESSURE: 132 MMHG | RESPIRATION RATE: 16 BRPM | HEIGHT: 64 IN

## 2023-03-27 LAB
B-HCG UR QL: NEGATIVE
EXPIRATION DATE: NORMAL
GLUCOSE BLDC GLUCOMTR-MCNC: 84 MG/DL (ref 70–130)
GLUCOSE BLDC GLUCOMTR-MCNC: 92 MG/DL (ref 70–130)
INTERNAL NEGATIVE CONTROL: NEGATIVE
INTERNAL POSITIVE CONTROL: POSITIVE
Lab: NORMAL

## 2023-03-27 PROCEDURE — 25010000002 MIDAZOLAM PER 1 MG: Performed by: NURSE ANESTHETIST, CERTIFIED REGISTERED

## 2023-03-27 PROCEDURE — 25010000002 ONDANSETRON PER 1 MG: Performed by: NURSE ANESTHETIST, CERTIFIED REGISTERED

## 2023-03-27 PROCEDURE — 0 LIDOCAINE 1 % SOLUTION 2 ML VIAL: Performed by: GENERAL PRACTICE

## 2023-03-27 PROCEDURE — 82962 GLUCOSE BLOOD TEST: CPT

## 2023-03-27 PROCEDURE — L3908 WHO COCK-UP NONMOLDE PRE OTS: HCPCS | Performed by: GENERAL PRACTICE

## 2023-03-27 PROCEDURE — 25010000002 FENTANYL CITRATE (PF) 50 MCG/ML SOLUTION: Performed by: NURSE ANESTHETIST, CERTIFIED REGISTERED

## 2023-03-27 PROCEDURE — 81025 URINE PREGNANCY TEST: CPT | Performed by: ANESTHESIOLOGY

## 2023-03-27 RX ORDER — IPRATROPIUM BROMIDE AND ALBUTEROL SULFATE 2.5; .5 MG/3ML; MG/3ML
3 SOLUTION RESPIRATORY (INHALATION) ONCE AS NEEDED
Status: DISCONTINUED | OUTPATIENT
Start: 2023-03-27 | End: 2023-03-27 | Stop reason: HOSPADM

## 2023-03-27 RX ORDER — MIDAZOLAM HYDROCHLORIDE 1 MG/ML
INJECTION INTRAMUSCULAR; INTRAVENOUS AS NEEDED
Status: DISCONTINUED | OUTPATIENT
Start: 2023-03-27 | End: 2023-03-27 | Stop reason: SURG

## 2023-03-27 RX ORDER — FENTANYL CITRATE 50 UG/ML
50 INJECTION, SOLUTION INTRAMUSCULAR; INTRAVENOUS
Status: DISCONTINUED | OUTPATIENT
Start: 2023-03-27 | End: 2023-03-27 | Stop reason: HOSPADM

## 2023-03-27 RX ORDER — KETOROLAC TROMETHAMINE 30 MG/ML
30 INJECTION, SOLUTION INTRAMUSCULAR; INTRAVENOUS EVERY 6 HOURS PRN
Status: DISCONTINUED | OUTPATIENT
Start: 2023-03-27 | End: 2023-03-27 | Stop reason: HOSPADM

## 2023-03-27 RX ORDER — MEPERIDINE HYDROCHLORIDE 25 MG/ML
12.5 INJECTION INTRAMUSCULAR; INTRAVENOUS; SUBCUTANEOUS
Status: DISCONTINUED | OUTPATIENT
Start: 2023-03-27 | End: 2023-03-27 | Stop reason: HOSPADM

## 2023-03-27 RX ORDER — SODIUM CHLORIDE 0.9 % (FLUSH) 0.9 %
10 SYRINGE (ML) INJECTION EVERY 12 HOURS SCHEDULED
Status: DISCONTINUED | OUTPATIENT
Start: 2023-03-27 | End: 2023-03-27 | Stop reason: HOSPADM

## 2023-03-27 RX ORDER — FENTANYL CITRATE 50 UG/ML
INJECTION, SOLUTION INTRAMUSCULAR; INTRAVENOUS AS NEEDED
Status: DISCONTINUED | OUTPATIENT
Start: 2023-03-27 | End: 2023-03-27 | Stop reason: SURG

## 2023-03-27 RX ORDER — SODIUM CHLORIDE, SODIUM LACTATE, POTASSIUM CHLORIDE, CALCIUM CHLORIDE 600; 310; 30; 20 MG/100ML; MG/100ML; MG/100ML; MG/100ML
INJECTION, SOLUTION INTRAVENOUS CONTINUOUS PRN
Status: DISCONTINUED | OUTPATIENT
Start: 2023-03-27 | End: 2023-03-27 | Stop reason: SURG

## 2023-03-27 RX ORDER — LIDOCAINE HYDROCHLORIDE 20 MG/ML
INJECTION, SOLUTION EPIDURAL; INFILTRATION; INTRACAUDAL; PERINEURAL AS NEEDED
Status: DISCONTINUED | OUTPATIENT
Start: 2023-03-27 | End: 2023-03-27 | Stop reason: SURG

## 2023-03-27 RX ORDER — ONDANSETRON 2 MG/ML
INJECTION INTRAMUSCULAR; INTRAVENOUS AS NEEDED
Status: DISCONTINUED | OUTPATIENT
Start: 2023-03-27 | End: 2023-03-27 | Stop reason: SURG

## 2023-03-27 RX ORDER — SODIUM CHLORIDE 0.9 % (FLUSH) 0.9 %
10 SYRINGE (ML) INJECTION AS NEEDED
Status: DISCONTINUED | OUTPATIENT
Start: 2023-03-27 | End: 2023-03-27 | Stop reason: HOSPADM

## 2023-03-27 RX ORDER — CLINDAMYCIN PHOSPHATE 900 MG/50ML
900 INJECTION INTRAVENOUS
Status: COMPLETED | OUTPATIENT
Start: 2023-03-27 | End: 2023-03-27

## 2023-03-27 RX ORDER — FAMOTIDINE 10 MG/ML
INJECTION, SOLUTION INTRAVENOUS AS NEEDED
Status: DISCONTINUED | OUTPATIENT
Start: 2023-03-27 | End: 2023-03-27 | Stop reason: SURG

## 2023-03-27 RX ORDER — MAGNESIUM HYDROXIDE 1200 MG/15ML
LIQUID ORAL AS NEEDED
Status: DISCONTINUED | OUTPATIENT
Start: 2023-03-27 | End: 2023-03-27 | Stop reason: HOSPADM

## 2023-03-27 RX ORDER — OXYCODONE HYDROCHLORIDE AND ACETAMINOPHEN 5; 325 MG/1; MG/1
1 TABLET ORAL ONCE AS NEEDED
Status: COMPLETED | OUTPATIENT
Start: 2023-03-27 | End: 2023-03-27

## 2023-03-27 RX ORDER — ONDANSETRON 2 MG/ML
4 INJECTION INTRAMUSCULAR; INTRAVENOUS AS NEEDED
Status: DISCONTINUED | OUTPATIENT
Start: 2023-03-27 | End: 2023-03-27 | Stop reason: HOSPADM

## 2023-03-27 RX ORDER — SODIUM CHLORIDE 9 MG/ML
40 INJECTION, SOLUTION INTRAVENOUS AS NEEDED
Status: DISCONTINUED | OUTPATIENT
Start: 2023-03-27 | End: 2023-03-27 | Stop reason: HOSPADM

## 2023-03-27 RX ORDER — MIDAZOLAM HYDROCHLORIDE 1 MG/ML
1 INJECTION INTRAMUSCULAR; INTRAVENOUS
Status: DISCONTINUED | OUTPATIENT
Start: 2023-03-27 | End: 2023-03-27 | Stop reason: HOSPADM

## 2023-03-27 RX ORDER — SODIUM CHLORIDE, SODIUM LACTATE, POTASSIUM CHLORIDE, CALCIUM CHLORIDE 600; 310; 30; 20 MG/100ML; MG/100ML; MG/100ML; MG/100ML
125 INJECTION, SOLUTION INTRAVENOUS ONCE
Status: COMPLETED | OUTPATIENT
Start: 2023-03-27 | End: 2023-03-27

## 2023-03-27 RX ORDER — SODIUM CHLORIDE, SODIUM LACTATE, POTASSIUM CHLORIDE, CALCIUM CHLORIDE 600; 310; 30; 20 MG/100ML; MG/100ML; MG/100ML; MG/100ML
100 INJECTION, SOLUTION INTRAVENOUS ONCE AS NEEDED
Status: DISCONTINUED | OUTPATIENT
Start: 2023-03-27 | End: 2023-03-27 | Stop reason: HOSPADM

## 2023-03-27 RX ADMIN — FAMOTIDINE 20 MG: 10 INJECTION, SOLUTION INTRAVENOUS at 08:35

## 2023-03-27 RX ADMIN — SODIUM CHLORIDE, POTASSIUM CHLORIDE, SODIUM LACTATE AND CALCIUM CHLORIDE: 600; 310; 30; 20 INJECTION, SOLUTION INTRAVENOUS at 08:35

## 2023-03-27 RX ADMIN — OXYCODONE HYDROCHLORIDE AND ACETAMINOPHEN 1 TABLET: 5; 325 TABLET ORAL at 09:41

## 2023-03-27 RX ADMIN — MIDAZOLAM HYDROCHLORIDE 2 MG: 1 INJECTION, SOLUTION INTRAMUSCULAR; INTRAVENOUS at 08:35

## 2023-03-27 RX ADMIN — FENTANYL CITRATE 100 MCG: 50 INJECTION INTRAMUSCULAR; INTRAVENOUS at 08:39

## 2023-03-27 RX ADMIN — LIDOCAINE HYDROCHLORIDE 60 MG: 20 INJECTION, SOLUTION EPIDURAL; INFILTRATION; INTRACAUDAL; PERINEURAL at 08:35

## 2023-03-27 RX ADMIN — SODIUM CHLORIDE, POTASSIUM CHLORIDE, SODIUM LACTATE AND CALCIUM CHLORIDE 125 ML/HR: 600; 310; 30; 20 INJECTION, SOLUTION INTRAVENOUS at 07:36

## 2023-03-27 RX ADMIN — CLINDAMYCIN PHOSPHATE 900 MG: 900 INJECTION, SOLUTION INTRAVENOUS at 08:45

## 2023-03-27 RX ADMIN — ONDANSETRON 4 MG: 2 INJECTION INTRAMUSCULAR; INTRAVENOUS at 08:39

## 2023-03-27 RX ADMIN — MIDAZOLAM HYDROCHLORIDE 2 MG: 1 INJECTION, SOLUTION INTRAMUSCULAR; INTRAVENOUS at 08:39

## 2023-03-27 RX ADMIN — MIDAZOLAM HYDROCHLORIDE 2 MG: 1 INJECTION, SOLUTION INTRAMUSCULAR; INTRAVENOUS at 08:51

## 2023-03-27 NOTE — BRIEF OP NOTE
CARPAL TUNNEL RELEASE  Progress Note    Sherrill Nathan  3/27/2023    Pre-op Diagnosis:   G56.02       Post-Op Diagnosis Codes:  Left carpal tunnel syndrome    Procedure/CPT® Codes:  1.  Left carpal tunnel release, CPT code 92386      Procedure(s):  CARPAL TUNNEL RELEASE        Surgeon(s):  Kvng Hodges MD    Anesthesia: General    Staff:   Circulator: Gonzalez Arevalo RN  Scrub Person: Phoebe Guillen  Assistant: Jozef Martines  Assistant: Jozef Martines      Estimated Blood Loss: none    Urine Voided: * No values recorded between 3/27/2023  8:35 AM and 3/27/2023  9:08 AM *    Specimens:                None          Drains: * No LDAs found *    Findings: See operative note        Complications: None apparent    Assistant: Jozef Martines  was responsible for performing the following activities: Retraction, Suction, Irrigation, Suturing, Closing and Placing Dressing and their skilled assistance was necessary for the success of this case.    Kvng Hodges MD     Date: 3/27/2023  Time: 09:09 EDT

## 2023-03-27 NOTE — OP NOTE
Sherrill Nathan  3/27/2023      Operative Note:    Surgeon: GARRETT Hodges MD    Assistant: JANNA Martines    Pre-Operative Diagnosis:   Left wrist carpal tunnel syndrome    Post-Operative Diagnosis:   Same    Procedure(s):    1.  Left wrist carpal tunnel release, CPT code 64238    Anesthesia: MAC with local field block    Estimated Blood Loss: 0 cc    Specimen Obtained:  None    Complication(s):  None apparent.     Implants: None    Operative Indication:     The patient is a 42-year-old right-hand-dominant female with left carpal tunnel syndrome.  Due to her constant symptoms she elected to proceed with the above operation once here and the risk benefits alternatives and complications.    Operative Details:    The patient was met in the preoperative suite where the correct operative site was marked. The patient was brought to the operating room where anesthesia was initiated. The patient was positioned appropriately with all bony prominences well padded. The patient was prepped and draped in the usual sterile fashion and prior to incision a timeout was observed to verify the correct operative site, procedure and antibiotics.    Once a local field block was performed a longitudinal incision at the base of the palm was taken down through the subcutaneous tissue.  Blunt dissection was utilized and a knife was utilized to incise the palmaris fascia in line with the incision.  A hemostat was utilized to release the adhesions on the superficial aspect of the transverse carpal ligament.  The transverse carpal ligament was then incised in line with the incision exposing the median nerve.  There is adhesions that were noted but no ganglion cyst or scar formation noted.  The transverse carpal ligament was then incised proximally and distally releasing the nerve.  The wound was irrigated thoroughly with sterile saline.  Nylon sutures were utilized to close the skin.  A small dressing and volar splint was utilized with the wrist in neutral  flexion.  The patient was transferred to hospital bed in the PACU stable condition.  The patient tolerated procedure well without any complications.         Post-operative Plan:    Discharge to home today  Dressing-maintain dressing until follow-up  Weight Bear/Lifting Status-as tolerated  DVT PPx-aspirin 81 mg twice daily for 14 days  Follow up-2 weeks in the office    Electronically Signed by: Kvng Hodges MD

## 2023-03-27 NOTE — ANESTHESIA POSTPROCEDURE EVALUATION
Patient: Sherrill Nathan    Procedure Summary     Date: 03/27/23 Room / Location: [facility] OR 03 /  [facility] OR    Anesthesia Start: 0835 Anesthesia Stop: 0913    Procedure: CARPAL TUNNEL RELEASE (Left: Wrist) Diagnosis: (G56.02)    Surgeons: Kvng Hodges MD Provider: Stalin Boone MD    Anesthesia Type: general ASA Status: 2          Anesthesia Type: general    Vitals  Vitals Value Taken Time   /88 03/27/23 0929   Temp 97.5 °F (36.4 °C) 03/27/23 0914   Pulse 74 03/27/23 0929   Resp 20 03/27/23 0929   SpO2 99 % 03/27/23 0929           Post Anesthesia Care and Evaluation    Patient location during evaluation: PHASE II  Patient participation: complete - patient participated  Level of consciousness: awake and alert  Pain score: 1  Pain management: adequate    Airway patency: patent  Anesthetic complications: No anesthetic complications  PONV Status: controlled  Cardiovascular status: acceptable  Respiratory status: acceptable  Hydration status: acceptable

## 2023-03-27 NOTE — ANESTHESIA PREPROCEDURE EVALUATION
Anesthesia Evaluation     history of anesthetic complications: PONV  NPO Solid Status: > 8 hours  NPO Liquid Status: > 8 hours           Airway   Mallampati: II  TM distance: >3 FB  Neck ROM: full  No difficulty expected  Dental    (+) poor dentition    Pulmonary - normal exam   (+) asthma,sleep apnea,   Cardiovascular - normal exam    (+) hypertension, hyperlipidemia,       Neuro/Psych  (+) headaches,    GI/Hepatic/Renal/Endo    (+)  GERD,  diabetes mellitus,     Musculoskeletal     Abdominal  - normal exam   Substance History      OB/GYN          Other                        Anesthesia Plan    ASA 2     general     intravenous induction     Anesthetic plan, risks, benefits, and alternatives have been provided, discussed and informed consent has been obtained with: patient.        CODE STATUS:

## 2023-03-30 ENCOUNTER — TREATMENT (OUTPATIENT)
Dept: PHYSICAL THERAPY | Facility: CLINIC | Age: 43
End: 2023-03-30
Payer: MEDICAID

## 2023-03-30 ENCOUNTER — TELEPHONE (OUTPATIENT)
Dept: PHYSICAL THERAPY | Facility: CLINIC | Age: 43
End: 2023-03-30

## 2023-03-30 DIAGNOSIS — M54.2 PAIN, NECK: Primary | ICD-10-CM

## 2023-03-30 DIAGNOSIS — M48.02 NEURAL FORAMINAL STENOSIS OF CERVICAL SPINE: ICD-10-CM

## 2023-03-30 DIAGNOSIS — M50.30 DDD (DEGENERATIVE DISC DISEASE), CERVICAL: ICD-10-CM

## 2023-03-30 PROCEDURE — 97014 ELECTRIC STIMULATION THERAPY: CPT | Performed by: PHYSICAL THERAPIST

## 2023-03-30 PROCEDURE — 97110 THERAPEUTIC EXERCISES: CPT | Performed by: PHYSICAL THERAPIST

## 2023-03-30 PROCEDURE — 97140 MANUAL THERAPY 1/> REGIONS: CPT | Performed by: PHYSICAL THERAPIST

## 2023-03-30 NOTE — PROGRESS NOTES
Physical Therapy Daily Treatment Note      Patient: Sherrill Nathan   : 1980  Referring practitioner: Oscar Scott DO  Date of Initial Visit: Type: THERAPY  Noted: 3/13/2023  Today's Date: 3/30/2023  Patient seen for 3 sessions       Visit Diagnoses:    ICD-10-CM ICD-9-CM   1. Pain, neck  M54.2 723.1   2. DDD (degenerative disc disease), cervical  M50.30 722.4   3. Neural foraminal stenosis of cervical spine  M48.02 723.0       Subjective Evaluation    History of Present Illness    Subjective comment: Pt reports 9/10 neck pain prior to today's session. She states she underwent left carpal tunnel surgery on 3/27/23.       Objective   See Exercise, Manual, and Modality Logs for complete treatment.       Assessment & Plan     Assessment    Assessment details: Treating physical therapist contacted Dr. Kvng Hodges, who performed the patient's carpal tunnel surgery, with patient's permission. Dr. Hodges stated the patient was clear to continue physical therapy of the cervical region and may use the left hand as tolerated, however the hand is to remain covered.   Today's session was initiated with STM to the left UT, with tenderness reported, to address muscle tightness. Therapeutic exercises were then performed for improved cervical ROM and scapular stability. Cervical isometrics were introduced, with no reports of increased pain. Tactile and verbal cues were provided for proper form. Today's session concluded with MH combined with IFC to the left UT region for pain relief and muscle relaxation. No skin irritation was observed following modalities. The patient reported 6/10 cervical pain following today's session.    Plan  Plan details: Progress as indicated.          Timed:         Manual Therapy:    14     mins  20341;     Therapeutic Exercise:    18     mins  31387;     Neuromuscular Sierra:        mins  92070;    Therapeutic Activity:          mins  44274;     Gait Training:           mins  86442;     Ultrasound:           mins  10617;    Ionto                                   mins   43223  Self Care                            mins   81724  Canalith Repos         mins 79133      Un-Timed:  Electrical Stimulation:    10     mins  90695 ( );  Dry Needling          mins self-pay  Traction          mins 07333      Timed Treatment:   32   mins   Total Treatment:     42   mins    Ashley Claudene Dalton, PT  [place] License: [id number]

## 2023-03-31 ENCOUNTER — TELEPHONE (OUTPATIENT)
Dept: PHYSICAL THERAPY | Facility: CLINIC | Age: 43
End: 2023-03-31
Payer: MEDICAID

## 2023-03-31 NOTE — TELEPHONE ENCOUNTER
Physical therapist called Dr. Kvng Hodges prior to 3/30/23 PT session regarding recent carpal tunnel surgery performed on 3/27/23. The patient provided consent to PT to contact Dr. Hodges.   Dr. Hodges stated the patient is cleared for physical therapy of the cervical region. He stated the patient may use the left had as tolerated. However, he reported the hand is to remain covered.

## 2023-04-04 ENCOUNTER — PROCEDURE VISIT (OUTPATIENT)
Dept: ORTHOPEDIC SURGERY | Facility: CLINIC | Age: 43
End: 2023-04-04
Payer: MEDICAID

## 2023-04-04 VITALS
DIASTOLIC BLOOD PRESSURE: 93 MMHG | BODY MASS INDEX: 37.56 KG/M2 | HEART RATE: 84 BPM | OXYGEN SATURATION: 100 % | WEIGHT: 220 LBS | HEIGHT: 64 IN | SYSTOLIC BLOOD PRESSURE: 135 MMHG

## 2023-04-04 DIAGNOSIS — Z98.890 S/P CARPAL TUNNEL RELEASE: ICD-10-CM

## 2023-04-04 DIAGNOSIS — M48.02 NEURAL FORAMINAL STENOSIS OF CERVICAL SPINE: ICD-10-CM

## 2023-04-04 DIAGNOSIS — M54.2 NECK PAIN: ICD-10-CM

## 2023-04-04 DIAGNOSIS — M47.22 OSTEOARTHRITIS OF SPINE WITH RADICULOPATHY, CERVICAL REGION: Primary | ICD-10-CM

## 2023-04-04 DIAGNOSIS — M50.30 DDD (DEGENERATIVE DISC DISEASE), CERVICAL: ICD-10-CM

## 2023-04-04 PROCEDURE — 99213 OFFICE O/P EST LOW 20 MIN: CPT | Performed by: FAMILY MEDICINE

## 2023-04-04 NOTE — PROGRESS NOTES
Follow Up Visit      Patient: Sherrill Nathan  YOB: 1980  Date of Encounter: 04/04/2023  PCP: Priti Ley  Referring Provider: No ref. provider found     Subjective   Sherrill Nathan is a 42 y.o. female who presents to the office today for evaluation of Follow-up and Pain of the Neck      Chief Complaint   Patient presents with   • Neck - Follow-up, Pain       HPI    The patient presents for follow up of chronic neck and radicular symptoms and states that it is worsening. OMT at the last visit did not help and may have flared-up her pain. She reports that PT also flares-up her pain and the only thing that is helping is when PT uses a Tens unit on her. She does have a Tens unit at home but admits she has not been using it. The patient also had a left carpal tunnel release performed on 03/27/2023, by Dr. Hodges, and is still in a brace from this surgery. We are unable tell how well the surgery worked.     There is no problem list on file for this patient.      Past Medical History:   Diagnosis Date   • Acid reflux    • Arthritis    • Arthritis of back    • Arthritis of neck 2/27/23   • Asthma    • Cervical disc disorder    • Claustrophobia    • CTS (carpal tunnel syndrome)    • Diabetes    • Functional dyspepsia    • High blood pressure    • High cholesterol    • Hip arthrosis    • Lumbosacral disc disease    • Migraines    • Neck strain    • Osteoarthritis    • PONV (postoperative nausea and vomiting)    • Sleep apnea    • Thoracic disc disorder        Allergies   Allergen Reactions   • Amoxicillin Anaphylaxis and Hives   • Penicillins Anaphylaxis and Hives   • Sulfa Antibiotics Anaphylaxis and Hives       Review of Systems   Constitutional: Positive for activity change. Negative for fever.   Respiratory: Negative for shortness of breath and wheezing.    Cardiovascular: Negative for chest pain.   Musculoskeletal: Positive for arthralgias, myalgias and neck pain.   Skin: Negative for color change and wound.  "  Neurological: Negative for weakness and numbness.       Visit Vitals  /93 (BP Location: Right arm, Patient Position: Sitting, Cuff Size: Adult)   Pulse 84   Ht 162.6 cm (64.02\")   Wt 99.8 kg (220 lb)   SpO2 100%   BMI 37.74 kg/m²     42 y.o.female  Physical Exam  Vitals and nursing note reviewed.   Constitutional:       General: She is not in acute distress.     Appearance: Normal appearance.   Pulmonary:      Effort: Pulmonary effort is normal. No respiratory distress.   Musculoskeletal:      Cervical back: Spasms and tenderness present. Decreased range of motion.      Comments:     Skin:     General: Skin is warm and dry.      Findings: No erythema.   Neurological:      General: No focal deficit present.      Mental Status: She is alert.      Sensory: No sensory deficit.      Motor: No weakness.         Radiology Results:    No radiology results for the last 30 days.    Assessment & Plan   Diagnoses and all orders for this visit:    1. Osteoarthritis of spine with radiculopathy, cervical region (Primary)    2. Neural foraminal stenosis of cervical spine    3. Neck pain    4. DDD (degenerative disc disease), cervical    5. S/P carpal tunnel release         MEDS ORDERED DURING VISIT:  No orders of the defined types were placed in this encounter.    MEDICATION ISSUES:  Discussed medication options and treatment plan of prescribed medication as well as the risks, benefits, and side effects including potential falls, possible impaired driving and metabolic adversities among others. Patient is agreeable to call the office with any worsening of symptoms or onset of side effects. Patient is agreeable to call 911 or go to the nearest ER should he/she begin having SI/HI.     Discussion:  We will hold off on OMT today. I recommend that the patient follow up at a time when she can use her Tens unit right before coming in and should bring it with her so that I may take a look at it. Follow up for osteopathic evaluation " and management.              This document has been electronically signed by Chandrika Hewitt   April 4, 2023 11:21 EDT    Dictated Utilizing Dragon Dictation: Part of this note may be an electronic transcription/translation of spoken language to printed text using the Dragon Dictation System.    Transcribed from ambient dictation for Oscar Scott DO by Chandrika Hewitt.  04/04/23   11:21 EDT    I have personally performed the services described in this document as transcribed by the above individual, and it is both accurate and complete.

## 2023-04-05 ENCOUNTER — TREATMENT (OUTPATIENT)
Dept: PHYSICAL THERAPY | Facility: CLINIC | Age: 43
End: 2023-04-05
Payer: MEDICAID

## 2023-04-05 DIAGNOSIS — M54.2 PAIN, NECK: Primary | ICD-10-CM

## 2023-04-05 DIAGNOSIS — M50.30 DDD (DEGENERATIVE DISC DISEASE), CERVICAL: ICD-10-CM

## 2023-04-05 DIAGNOSIS — M48.02 NEURAL FORAMINAL STENOSIS OF CERVICAL SPINE: ICD-10-CM

## 2023-04-05 PROCEDURE — 97110 THERAPEUTIC EXERCISES: CPT | Performed by: PHYSICAL THERAPIST

## 2023-04-05 PROCEDURE — 97014 ELECTRIC STIMULATION THERAPY: CPT | Performed by: PHYSICAL THERAPIST

## 2023-04-05 NOTE — PROGRESS NOTES
Physical Therapy Daily Treatment Note      Patient: Sherrill Nathan   : 1980  Referring practitioner: Oscar Scott DO  Date of Initial Visit: Type: THERAPY  Noted: 3/13/2023  Today's Date: 2023  Patient seen for 4 sessions       Visit Diagnoses:    ICD-10-CM ICD-9-CM   1. Pain, neck  M54.2 723.1   2. DDD (degenerative disc disease), cervical  M50.30 722.4   3. Neural foraminal stenosis of cervical spine  M48.02 723.0       Subjective Evaluation    History of Present Illness    Subjective comment: Pt reports having 8/10 pain today.       Objective   See Exercise, Manual, and Modality Logs for complete treatment.       Assessment & Plan     Assessment    Assessment details: Tx today consisted exercises for improved cervical mobility and postural stability; followed by estim and mh to right UT.  Pt responded well to tx with reports of 5/10 post pain.  Pt requested to hold stm today due to poor response with massage in past visits.    Plan  Plan details: Will follow progressing decreased pain in order to improve ADLs.          Timed:         Manual Therapy:         mins  45160;     Therapeutic Exercise:    17     mins  91959;     Neuromuscular Sierra:        mins  05849;    Therapeutic Activity:          mins  16405;     Gait Training:           mins  88660;     Ultrasound:          mins  11791;    Ionto                                   mins   05297  Self Care                            mins   60386  Canalith Repos         mins 14698      Un-Timed:  Electrical Stimulation:    10     mins  22514 ( );  Dry Needling          mins self-pay  Traction          mins 11108      Timed Treatment:   17   mins   Total Treatment:     27   mins    Edward Saavedra PT  [place] License: [id number]      Electronically signed by Edward Saavedra PT, 23, 2:03 PM EDT

## 2023-04-10 ENCOUNTER — TELEPHONE (OUTPATIENT)
Dept: PHYSICAL THERAPY | Facility: CLINIC | Age: 43
End: 2023-04-10

## 2023-04-12 ENCOUNTER — TREATMENT (OUTPATIENT)
Dept: PHYSICAL THERAPY | Facility: CLINIC | Age: 43
End: 2023-04-12
Payer: MEDICAID

## 2023-04-12 DIAGNOSIS — M48.02 NEURAL FORAMINAL STENOSIS OF CERVICAL SPINE: ICD-10-CM

## 2023-04-12 DIAGNOSIS — M54.2 PAIN, NECK: Primary | ICD-10-CM

## 2023-04-12 DIAGNOSIS — M50.30 DDD (DEGENERATIVE DISC DISEASE), CERVICAL: ICD-10-CM

## 2023-04-12 PROCEDURE — 97014 ELECTRIC STIMULATION THERAPY: CPT | Performed by: PHYSICAL THERAPIST

## 2023-04-12 PROCEDURE — 97110 THERAPEUTIC EXERCISES: CPT | Performed by: PHYSICAL THERAPIST

## 2023-04-12 NOTE — PROGRESS NOTES
Physical Therapy Daily Treatment Note      Patient: Sherrill Nathan   : 1980  Referring practitioner: Oscar Scott DO  Date of Initial Visit: Type: THERAPY  Noted: 3/13/2023  Today's Date: 2023  Patient seen for 5 sessions       Visit Diagnoses:    ICD-10-CM ICD-9-CM   1. Pain, neck  M54.2 723.1   2. DDD (degenerative disc disease), cervical  M50.30 722.4   3. Neural foraminal stenosis of cervical spine  M48.02 723.0       Subjective: Patient reports 7/10 neck pain with continued left UE numbness, tingling, and burning. Pt states she feels the e-stim helps, but wishes to hold the massage.     Objective   See Exercise, Manual, and Modality Logs for complete treatment.       Assessment/Plan: Patient completed today's session with reports of slight decrease in pain following, 5/10. Treatment initiated with therex as listed with continued focus on improved cervical ROM, improved cervical stability, and postural awareness f/b conclusion of modalities. Exercise progressed with additional scapular strengthening activities added including sternal lifts and lawnmowers. Pt observed with good tolerance and was provided with cues for form. Pt will be progressed with therapy as tolerated to address goals, reduce pain, and restore function. No adverse reactions observed during, and/ or following tx. Continue with PT's POC.       Timed:         Manual Therapy:         mins  00375;     Therapeutic Exercise:     24    mins  54262;     Neuromuscular Sierra:        mins  27632;    Therapeutic Activity:          mins  29023;     Gait Training:           mins  17707;     Ultrasound:          mins  28476;    Ionto                                   mins   26728  Self Care                            mins   74441  Canalith Repos         mins 09390      Un-Timed:  Electrical Stimulation:   15      mins  07785 ( );  Dry Needling          mins self-pay  Traction          mins 20192      Timed Treatment:  24    mins   Total  Treatment:    39    mins    Hue Colon. Isai, PTA  [place] License: [id number]

## 2023-04-15 ENCOUNTER — APPOINTMENT (OUTPATIENT)
Dept: ULTRASOUND IMAGING | Facility: HOSPITAL | Age: 43
End: 2023-04-15
Payer: MEDICAID

## 2023-04-15 ENCOUNTER — APPOINTMENT (OUTPATIENT)
Dept: GENERAL RADIOLOGY | Facility: HOSPITAL | Age: 43
End: 2023-04-15
Payer: MEDICAID

## 2023-04-15 ENCOUNTER — HOSPITAL ENCOUNTER (EMERGENCY)
Facility: HOSPITAL | Age: 43
Discharge: HOME | End: 2023-04-15
Attending: EMERGENCY MEDICINE | Admitting: EMERGENCY MEDICINE
Payer: MEDICAID

## 2023-04-15 VITALS
OXYGEN SATURATION: 98 % | RESPIRATION RATE: 16 BRPM | DIASTOLIC BLOOD PRESSURE: 82 MMHG | TEMPERATURE: 97.5 F | HEART RATE: 101 BPM | WEIGHT: 220 LBS | SYSTOLIC BLOOD PRESSURE: 145 MMHG | HEIGHT: 64 IN | BODY MASS INDEX: 37.56 KG/M2

## 2023-04-15 DIAGNOSIS — M79.642 LEFT HAND PAIN: Primary | ICD-10-CM

## 2023-04-15 LAB
ALBUMIN SERPL-MCNC: 4 G/DL (ref 3.5–5.2)
ALBUMIN/GLOB SERPL: 1.2 G/DL
ALP SERPL-CCNC: 83 U/L (ref 39–117)
ALT SERPL W P-5'-P-CCNC: 19 U/L (ref 1–33)
ANION GAP SERPL CALCULATED.3IONS-SCNC: 12 MMOL/L (ref 5–15)
APTT PPP: 27.6 SECONDS (ref 26.5–34.5)
AST SERPL-CCNC: 20 U/L (ref 1–32)
BASOPHILS # BLD AUTO: 0.05 10*3/MM3 (ref 0–0.2)
BASOPHILS NFR BLD AUTO: 0.5 % (ref 0–1.5)
BILIRUB SERPL-MCNC: 0.3 MG/DL (ref 0–1.2)
BUN SERPL-MCNC: 14 MG/DL (ref 6–20)
BUN/CREAT SERPL: 14.9 (ref 7–25)
CALCIUM SPEC-SCNC: 9.3 MG/DL (ref 8.6–10.5)
CHLORIDE SERPL-SCNC: 108 MMOL/L (ref 98–107)
CO2 SERPL-SCNC: 23 MMOL/L (ref 22–29)
CREAT SERPL-MCNC: 0.94 MG/DL (ref 0.57–1)
DEPRECATED RDW RBC AUTO: 40.7 FL (ref 37–54)
EGFRCR SERPLBLD CKD-EPI 2021: 76.9 ML/MIN/1.73
EOSINOPHIL # BLD AUTO: 0.09 10*3/MM3 (ref 0–0.4)
EOSINOPHIL NFR BLD AUTO: 0.9 % (ref 0.3–6.2)
ERYTHROCYTE [DISTWIDTH] IN BLOOD BY AUTOMATED COUNT: 11.5 % (ref 12.3–15.4)
GLOBULIN UR ELPH-MCNC: 3.4 GM/DL
GLUCOSE SERPL-MCNC: 123 MG/DL (ref 65–99)
HCT VFR BLD AUTO: 45.7 % (ref 34–46.6)
HGB BLD-MCNC: 14.9 G/DL (ref 12–15.9)
HOLD SPECIMEN: NORMAL
HOLD SPECIMEN: NORMAL
IMM GRANULOCYTES # BLD AUTO: 0.06 10*3/MM3 (ref 0–0.05)
IMM GRANULOCYTES NFR BLD AUTO: 0.6 % (ref 0–0.5)
INR PPP: 0.87 (ref 0.9–1.1)
LYMPHOCYTES # BLD AUTO: 2.94 10*3/MM3 (ref 0.7–3.1)
LYMPHOCYTES NFR BLD AUTO: 30.8 % (ref 19.6–45.3)
MCH RBC QN AUTO: 31.3 PG (ref 26.6–33)
MCHC RBC AUTO-ENTMCNC: 32.6 G/DL (ref 31.5–35.7)
MCV RBC AUTO: 96 FL (ref 79–97)
MONOCYTES # BLD AUTO: 0.51 10*3/MM3 (ref 0.1–0.9)
MONOCYTES NFR BLD AUTO: 5.3 % (ref 5–12)
NEUTROPHILS NFR BLD AUTO: 5.89 10*3/MM3 (ref 1.7–7)
NEUTROPHILS NFR BLD AUTO: 61.9 % (ref 42.7–76)
NRBC BLD AUTO-RTO: 0 /100 WBC (ref 0–0.2)
PLATELET # BLD AUTO: 325 10*3/MM3 (ref 140–450)
PMV BLD AUTO: 9.7 FL (ref 6–12)
POTASSIUM SERPL-SCNC: 3.8 MMOL/L (ref 3.5–5.2)
PROT SERPL-MCNC: 7.4 G/DL (ref 6–8.5)
PROTHROMBIN TIME: 12.3 SECONDS (ref 12.1–14.7)
RBC # BLD AUTO: 4.76 10*6/MM3 (ref 3.77–5.28)
SODIUM SERPL-SCNC: 143 MMOL/L (ref 136–145)
WBC NRBC COR # BLD: 9.54 10*3/MM3 (ref 3.4–10.8)
WHOLE BLOOD HOLD COAG: NORMAL
WHOLE BLOOD HOLD SPECIMEN: NORMAL

## 2023-04-15 PROCEDURE — 93971 EXTREMITY STUDY: CPT | Performed by: RADIOLOGY

## 2023-04-15 PROCEDURE — 80053 COMPREHEN METABOLIC PANEL: CPT | Performed by: PHYSICIAN ASSISTANT

## 2023-04-15 PROCEDURE — 99284 EMERGENCY DEPT VISIT MOD MDM: CPT

## 2023-04-15 PROCEDURE — 93931 UPPER EXTREMITY STUDY: CPT | Performed by: RADIOLOGY

## 2023-04-15 PROCEDURE — 73110 X-RAY EXAM OF WRIST: CPT

## 2023-04-15 PROCEDURE — 73130 X-RAY EXAM OF HAND: CPT

## 2023-04-15 PROCEDURE — 73110 X-RAY EXAM OF WRIST: CPT | Performed by: RADIOLOGY

## 2023-04-15 PROCEDURE — 85610 PROTHROMBIN TIME: CPT | Performed by: PHYSICIAN ASSISTANT

## 2023-04-15 PROCEDURE — 93931 UPPER EXTREMITY STUDY: CPT

## 2023-04-15 PROCEDURE — 85730 THROMBOPLASTIN TIME PARTIAL: CPT | Performed by: PHYSICIAN ASSISTANT

## 2023-04-15 PROCEDURE — 73130 X-RAY EXAM OF HAND: CPT | Performed by: RADIOLOGY

## 2023-04-15 PROCEDURE — 85025 COMPLETE CBC W/AUTO DIFF WBC: CPT | Performed by: PHYSICIAN ASSISTANT

## 2023-04-15 PROCEDURE — 93971 EXTREMITY STUDY: CPT

## 2023-04-15 RX ORDER — SODIUM CHLORIDE 0.9 % (FLUSH) 0.9 %
10 SYRINGE (ML) INJECTION AS NEEDED
Status: DISCONTINUED | OUTPATIENT
Start: 2023-04-15 | End: 2023-04-15 | Stop reason: HOSPADM

## 2023-04-15 RX ORDER — HYDROCODONE BITARTRATE AND ACETAMINOPHEN 7.5; 325 MG/1; MG/1
1 TABLET ORAL ONCE
Status: COMPLETED | OUTPATIENT
Start: 2023-04-15 | End: 2023-04-15

## 2023-04-15 RX ADMIN — HYDROCODONE BITARTRATE AND ACETAMINOPHEN 1 TABLET: 7.5; 325 TABLET ORAL at 12:04

## 2023-04-15 NOTE — DISCHARGE INSTRUCTIONS
Call Dr. Hodges's office on Monday to schedule the next available appointment for follow-up.  Return to the ED at any time if symptoms change or worsen.  You can alternate Tylenol and ibuprofen as needed for pain.

## 2023-04-15 NOTE — ED PROVIDER NOTES
Subjective   History of Present Illness  43-year-old female who presents to the ED today for left hand/wrist pain.  She reports that she had carpal tunnel surgery on this wrist by Dr. Hodges on March 27.  She had her sutures removed on April 11.  She states she has been having a lot of pain in this wrist since surgery but it became more intense about 4 days ago.  She states her left hand feels cold.  She states she noticed some bruising to the left anterior wrist last night and is not sure how it got there.  She did go to urgent care prior to coming here and they advised her to come to the ER to rule out a blood clot.    History provided by:  Patient  Arm Pain  Location:  Left hand/wrist  Severity:  Moderate  Onset quality:  Sudden  Duration:  2 weeks  Timing:  Constant  Progression:  Worsening  Chronicity:  New  Associated symptoms: no abdominal pain, no chest pain, no fever, no rash, no shortness of breath and no vomiting        Review of Systems   Constitutional: Negative.  Negative for fever.   HENT: Negative.    Eyes: Negative.    Respiratory: Negative for shortness of breath.    Cardiovascular: Negative for chest pain.   Gastrointestinal: Negative for abdominal pain and vomiting.   Genitourinary: Negative.    Musculoskeletal: Positive for arthralgias.   Skin: Positive for wound. Negative for rash.   Neurological: Negative.    Psychiatric/Behavioral: Negative.    All other systems reviewed and are negative.      Past Medical History:   Diagnosis Date   • Acid reflux    • Arthritis    • Arthritis of back    • Arthritis of neck 2/27/23   • Asthma    • Cervical disc disorder    • Claustrophobia    • CTS (carpal tunnel syndrome)    • Diabetes    • Functional dyspepsia    • High blood pressure    • High cholesterol    • Hip arthrosis    • Lumbosacral disc disease    • Migraines    • Neck strain    • Osteoarthritis    • PONV (postoperative nausea and vomiting)    • Sleep apnea    • Thoracic disc disorder         Allergies   Allergen Reactions   • Amoxicillin Anaphylaxis and Hives   • Penicillins Anaphylaxis and Hives   • Sulfa Antibiotics Anaphylaxis and Hives       Past Surgical History:   Procedure Laterality Date   • CARPAL TUNNEL RELEASE Left 2023    Procedure: CARPAL TUNNEL RELEASE;  Surgeon: Kvng Hodges MD;  Location: [facility];  Service: Orthopedics;  Laterality: Left;   •  SECTION     • COLONOSCOPY     • ELBOW PROCEDURE Left    • ENDOSCOPY     • RECTAL SURGERY     • WRIST SURGERY         Family History   Problem Relation Age of Onset   • Hypertension Mother    • Cancer Father    • Rheum arthritis Sister    • Rheumatologic disease Sister    • Hypertension Maternal Grandmother    • Cancer Maternal Grandmother         Breast cancer   • Osteoarthritis Maternal Grandmother    • Leukemia Maternal Grandmother    • Heart disease Maternal Grandmother    • Hypertension Maternal Grandfather    • Diabetes Maternal Grandfather    • Heart disease Maternal Grandfather    • Cancer Maternal Grandfather         Leukemia   • Osteoarthritis Maternal Grandfather    • Breast cancer Maternal Grandfather        Social History     Socioeconomic History   • Marital status:    Tobacco Use   • Smoking status: Every Day     Packs/day: 1.00     Years: 3.00     Pack years: 3.00     Types: Cigarettes   • Smokeless tobacco: Never   Vaping Use   • Vaping Use: Never used   Substance and Sexual Activity   • Alcohol use: Not Currently     Comment: rarely   • Drug use: Not Currently     Types: Amphetamines, Marijuana   • Sexual activity: Not Currently     Partners: Male     Birth control/protection: I.U.D., Abstinence           Objective   Physical Exam  Vitals and nursing note reviewed.   Constitutional:       General: She is not in acute distress.     Appearance: Normal appearance.   HENT:      Head: Normocephalic and atraumatic.      Right Ear: External ear normal.      Left Ear: External ear normal.   Eyes:       Conjunctiva/sclera: Conjunctivae normal.      Pupils: Pupils are equal, round, and reactive to light.   Cardiovascular:      Rate and Rhythm: Normal rate and regular rhythm.      Pulses: Normal pulses.      Heart sounds: Normal heart sounds.   Pulmonary:      Effort: Pulmonary effort is normal.      Breath sounds: Normal breath sounds.   Abdominal:      General: Bowel sounds are normal.      Palpations: Abdomen is soft.   Musculoskeletal:      Cervical back: Normal range of motion and neck supple.      Comments: Bruising noted to left anterior wrist. Incision appears clean, dry and intact, no evidence of cellulitis, no drainage. Mild tenderness over incision site with palpation. Cap refill is normal in all fingers on her left hand. Radial pulses are equal bilaterally. Hands feel the same temperature bilaterally.   Skin:     General: Skin is warm and dry.      Capillary Refill: Capillary refill takes less than 2 seconds.   Neurological:      General: No focal deficit present.      Mental Status: She is alert and oriented to person, place, and time.   Psychiatric:         Mood and Affect: Mood normal.         Procedures        Results for orders placed or performed during the hospital encounter of 04/15/23   Comprehensive Metabolic Panel    Specimen: Blood   Result Value Ref Range    Glucose 123 (H) 65 - 99 mg/dL    BUN 14 6 - 20 mg/dL    Creatinine 0.94 0.57 - 1.00 mg/dL    Sodium 143 136 - 145 mmol/L    Potassium 3.8 3.5 - 5.2 mmol/L    Chloride 108 (H) 98 - 107 mmol/L    CO2 23.0 22.0 - 29.0 mmol/L    Calcium 9.3 8.6 - 10.5 mg/dL    Total Protein 7.4 6.0 - 8.5 g/dL    Albumin 4.0 3.5 - 5.2 g/dL    ALT (SGPT) 19 1 - 33 U/L    AST (SGOT) 20 1 - 32 U/L    Alkaline Phosphatase 83 39 - 117 U/L    Total Bilirubin 0.3 0.0 - 1.2 mg/dL    Globulin 3.4 gm/dL    A/G Ratio 1.2 g/dL    BUN/Creatinine Ratio 14.9 7.0 - 25.0    Anion Gap 12.0 5.0 - 15.0 mmol/L    eGFR 76.9 >60.0 mL/min/1.73   Protime-INR    Specimen: Blood    Result Value Ref Range    Protime 12.3 12.1 - 14.7 Seconds    INR 0.87 (L) 0.90 - 1.10   aPTT    Specimen: Blood   Result Value Ref Range    PTT 27.6 26.5 - 34.5 seconds   CBC Auto Differential    Specimen: Blood   Result Value Ref Range    WBC 9.54 3.40 - 10.80 10*3/mm3    RBC 4.76 3.77 - 5.28 10*6/mm3    Hemoglobin 14.9 12.0 - 15.9 g/dL    Hematocrit 45.7 34.0 - 46.6 %    MCV 96.0 79.0 - 97.0 fL    MCH 31.3 26.6 - 33.0 pg    MCHC 32.6 31.5 - 35.7 g/dL    RDW 11.5 (L) 12.3 - 15.4 %    RDW-SD 40.7 37.0 - 54.0 fl    MPV 9.7 6.0 - 12.0 fL    Platelets 325 140 - 450 10*3/mm3    Neutrophil % 61.9 42.7 - 76.0 %    Lymphocyte % 30.8 19.6 - 45.3 %    Monocyte % 5.3 5.0 - 12.0 %    Eosinophil % 0.9 0.3 - 6.2 %    Basophil % 0.5 0.0 - 1.5 %    Immature Grans % 0.6 (H) 0.0 - 0.5 %    Neutrophils, Absolute 5.89 1.70 - 7.00 10*3/mm3    Lymphocytes, Absolute 2.94 0.70 - 3.10 10*3/mm3    Monocytes, Absolute 0.51 0.10 - 0.90 10*3/mm3    Eosinophils, Absolute 0.09 0.00 - 0.40 10*3/mm3    Basophils, Absolute 0.05 0.00 - 0.20 10*3/mm3    Immature Grans, Absolute 0.06 (H) 0.00 - 0.05 10*3/mm3    nRBC 0.0 0.0 - 0.2 /100 WBC   Green Top (Gel)   Result Value Ref Range    Extra Tube Hold for add-ons.    Lavender Top   Result Value Ref Range    Extra Tube hold for add-on    Gold Top - SST   Result Value Ref Range    Extra Tube Hold for add-ons.    Light Blue Top   Result Value Ref Range    Extra Tube Hold for add-ons.          ED Course  ED Course as of 04/15/23 1431   Sat Apr 15, 2023   1347 XR Wrist 3+ View Left  IMPRESSION:     1.  No acute process seen involving the left wrist. [AH]   1347 XR Hand 3+ View Left  IMPRESSION:     1.  No acute process seen in the left hand. [AH]   1347 US Arterial Doppler Upper Extremity Left  IMPRESSION:     1.  Patent arterial vasculature throughout the left upper extremity.  2.  No elevated peak systolic velocity is identified.  3.  No features to suggest focal stenosis.  4.  No significant peripheral  arterial vascular disease in the left  upper extremity. []   1347 US Venous Doppler Upper Extremity Left (duplex)  FINDINGS:     Normal color flow imaging. Normal venous compression and normal with  augmentation.  No DVT identified in the left upper extremity.     IMPRESSION:     1.  No DVT identified in the left upper extremity. []      ED Course User Index  [] Sherrill Cosby PA                                           Medical Decision Making  43-year-old female who presents to the ED today for left hand pain.  She had carpal tunnel surgery a little over 2 weeks ago and reports that she has had pain ever since but it has become worse over the last 4 days.  No evidence of DVT or arterial occlusion.  X-rays were unremarkable.  Lab work was unremarkable.  No evidence of infection.  She was given a hydrocodone in the ED and reports that that did help with her pain.  She will be able to be discharged home to follow-up outpatient.  She was advised to call her orthopedic surgeon's office on Monday to schedule the next available appointment.  She will return to the ED if symptoms change or worsen.    Left hand pain: complicated acute illness or injury  Amount and/or Complexity of Data Reviewed  Labs: ordered.  Radiology: ordered. Decision-making details documented in ED Course.      Risk  Prescription drug management.          Final diagnoses:   Left hand pain       ED Disposition  ED Disposition     ED Disposition   Discharge    Condition   Stable    Comment   --             Kvng Hodges MD  [address] [place] [address] [address] [place] [address] [phone]    Call in 2 days           Medication List      No changes were made to your prescriptions during this visit.          Sherrill Cosby PA  04/15/23 4728

## 2023-04-17 ENCOUNTER — TELEPHONE (OUTPATIENT)
Dept: PHYSICAL THERAPY | Facility: CLINIC | Age: 43
End: 2023-04-17

## 2023-04-20 ENCOUNTER — PROCEDURE VISIT (OUTPATIENT)
Dept: ORTHOPEDIC SURGERY | Facility: CLINIC | Age: 43
End: 2023-04-20
Payer: MEDICAID

## 2023-04-20 VITALS
RESPIRATION RATE: 18 BRPM | HEART RATE: 87 BPM | SYSTOLIC BLOOD PRESSURE: 136 MMHG | WEIGHT: 223 LBS | TEMPERATURE: 98.6 F | OXYGEN SATURATION: 100 % | HEIGHT: 64 IN | BODY MASS INDEX: 38.07 KG/M2 | DIASTOLIC BLOOD PRESSURE: 100 MMHG

## 2023-04-20 DIAGNOSIS — R20.2 LEFT HAND PARESTHESIA: ICD-10-CM

## 2023-04-20 DIAGNOSIS — M47.22 OSTEOARTHRITIS OF SPINE WITH RADICULOPATHY, CERVICAL REGION: Primary | ICD-10-CM

## 2023-04-20 DIAGNOSIS — M50.30 DDD (DEGENERATIVE DISC DISEASE), CERVICAL: ICD-10-CM

## 2023-04-20 DIAGNOSIS — M54.12 CERVICAL RADICULAR PAIN: ICD-10-CM

## 2023-04-20 DIAGNOSIS — Z98.890 S/P DECOMPRESSION OF ULNAR NERVE AT ELBOW: ICD-10-CM

## 2023-04-20 DIAGNOSIS — M54.2 NECK PAIN: ICD-10-CM

## 2023-04-20 DIAGNOSIS — M48.02 NEURAL FORAMINAL STENOSIS OF CERVICAL SPINE: ICD-10-CM

## 2023-04-20 DIAGNOSIS — M25.522 LEFT ELBOW PAIN: ICD-10-CM

## 2023-04-20 DIAGNOSIS — Z98.890 S/P CARPAL TUNNEL RELEASE: ICD-10-CM

## 2023-04-20 DIAGNOSIS — R29.898 LEFT HAND WEAKNESS: ICD-10-CM

## 2023-04-20 PROCEDURE — 99213 OFFICE O/P EST LOW 20 MIN: CPT | Performed by: FAMILY MEDICINE

## 2023-04-20 RX ORDER — EPINEPHRINE 0.3 MG/.3ML
INJECTION SUBCUTANEOUS
COMMUNITY
Start: 2023-03-22

## 2023-04-24 ENCOUNTER — TREATMENT (OUTPATIENT)
Dept: PHYSICAL THERAPY | Facility: CLINIC | Age: 43
End: 2023-04-24
Payer: MEDICAID

## 2023-04-24 DIAGNOSIS — M54.2 PAIN, NECK: Primary | ICD-10-CM

## 2023-04-24 DIAGNOSIS — M50.30 DDD (DEGENERATIVE DISC DISEASE), CERVICAL: ICD-10-CM

## 2023-04-24 DIAGNOSIS — M48.02 NEURAL FORAMINAL STENOSIS OF CERVICAL SPINE: ICD-10-CM

## 2023-04-27 ENCOUNTER — TELEPHONE (OUTPATIENT)
Dept: ORTHOPEDIC SURGERY | Facility: CLINIC | Age: 43
End: 2023-04-27
Payer: MEDICAID

## 2023-04-27 NOTE — TELEPHONE ENCOUNTER
PT called today wanting to know if she can have something prescribed prior to her MRI because she is claustrophobic. Breana Raymond MA

## 2023-04-28 NOTE — PROGRESS NOTES
Follow Up Visit      Patient: Sherrill Nathan  YOB: 1980  Date of Encounter: 04/20/2023  PCP: Priti Ley  Referring Provider: No ref. provider found     Subjective   Sherrill Nathan is a 43 y.o. female who presents to the office today for evaluation of Neck Pain      Chief Complaint   Patient presents with   • Neck Pain       HPI    The patient presents for follow up for neck pain and radicular symptoms. OMT has previously flared her up. She was supposed to use her TENS unit so that we could do further OMT treatments, but she has been unable to find it. PT continues to cause flare ups and then she continues to get significant neck pain with radiating symptoms. She has not responded to conservative management.        There is no problem list on file for this patient.      Past Medical History:   Diagnosis Date   • Acid reflux    • Arthritis    • Arthritis of back    • Arthritis of neck 2/27/23   • Asthma    • Cervical disc disorder    • Claustrophobia    • CTS (carpal tunnel syndrome)    • Diabetes    • Functional dyspepsia    • High blood pressure    • High cholesterol    • Hip arthrosis    • Lumbosacral disc disease    • Migraines    • Neck strain    • Osteoarthritis    • PONV (postoperative nausea and vomiting)    • Sleep apnea    • Thoracic disc disorder        Allergies   Allergen Reactions   • Amoxicillin Anaphylaxis and Hives   • Penicillins Anaphylaxis and Hives   • Sulfa Antibiotics Anaphylaxis and Hives       Review of Systems   Constitutional: Positive for activity change. Negative for fever.   Respiratory: Negative for shortness of breath and wheezing.    Cardiovascular: Negative for chest pain.   Musculoskeletal: Positive for arthralgias and myalgias.   Skin: Negative for color change and wound.   Neurological: Negative for weakness and numbness.       Visit Vitals  /100 (BP Location: Left arm, Patient Position: Sitting, Cuff Size: Adult)   Pulse 87   Temp 98.6 °F (37 °C) (Temporal)   Resp 18  "  Ht 162.6 cm (64\")   Wt 101 kg (223 lb)   SpO2 100%   BMI 38.28 kg/m²     43 y.o.female  Physical Exam  Vitals and nursing note reviewed.   Constitutional:       General: She is not in acute distress.     Appearance: Normal appearance.   Pulmonary:      Effort: Pulmonary effort is normal. No respiratory distress.   Musculoskeletal:      Cervical back: Spasms and tenderness present. Decreased range of motion.      Comments:     Skin:     General: Skin is warm and dry.      Findings: No erythema.   Neurological:      General: No focal deficit present.      Mental Status: She is alert.      Sensory: No sensory deficit.      Motor: No weakness.          Neck exam:  Continued cervical spasms and tenderness and restricted range of motion.  Significant spasm of bilateral paraspinals and trapezius with positive Spurling on the left, pain radiating into the extremity. Patient continues to have significant left arm pain related secondary to her carpal tunnel and cubital tunnel surgeries, which is being managed by another physician.    Radiology Results:    XR Wrist 3+ View Left    Result Date: 4/15/2023   1.  No acute process seen involving the left wrist.  This report was finalized on 4/15/2023 1:26 PM by Kendell Sorto MD.      XR Hand 3+ View Left    Result Date: 4/15/2023   1.  No acute process seen in the left hand.  This report was finalized on 4/15/2023 1:25 PM by Kendell Sorto MD.      US Arterial Doppler Upper Extremity Left    Result Date: 4/15/2023   1.  Patent arterial vasculature throughout the left upper extremity. 2.  No elevated peak systolic velocity is identified. 3.  No features to suggest focal stenosis. 4.  No significant peripheral arterial vascular disease in the left upper extremity.  This report was finalized on 4/15/2023 1:18 PM by Kendell Sorto MD.      US Venous Doppler Upper Extremity Left (duplex)    Result Date: 4/15/2023   1.  No DVT identified in the left upper extremity.  This report was " finalized on 4/15/2023 1:16 PM by Kendell Sorto MD.      MR BRAIN WITH & WITHOUT IV CONTRAST    Result Date: 4/7/2023  Unremarkable MRI of the brain, without and with Gadolinium administration.      Assessment & Plan   Diagnoses and all orders for this visit:    1. Osteoarthritis of spine with radiculopathy, cervical region (Primary)  -     MRI Cervical Spine Without Contrast; Future    2. Neural foraminal stenosis of cervical spine  -     MRI Cervical Spine Without Contrast; Future    3. Neck pain  -     MRI Cervical Spine Without Contrast; Future    4. DDD (degenerative disc disease), cervical  -     MRI Cervical Spine Without Contrast; Future    5. Cervical radicular pain  -     MRI Cervical Spine Without Contrast; Future    6. S/P carpal tunnel release  -     MRI Cervical Spine Without Contrast; Future    7. S/P decompression of ulnar nerve at elbow  -     MRI Cervical Spine Without Contrast; Future    8. Left hand weakness  -     MRI Cervical Spine Without Contrast; Future    9. Left elbow pain  -     MRI Cervical Spine Without Contrast; Future    10. Left hand paresthesia  -     MRI Cervical Spine Without Contrast; Future         MEDS ORDERED DURING VISIT:  No orders of the defined types were placed in this encounter.    MEDICATION ISSUES:  Discussed medication options and treatment plan of prescribed medication as well as the risks, benefits, and side effects including potential falls, possible impaired driving and metabolic adversities among others. Patient is agreeable to call the office with any worsening of symptoms or onset of side effects. Patient is agreeable to call 911 or go to the nearest ER should he/she begin having SI/HI.     Discussion:    At this point, patient is not doing well with conservative management, and I would recommend further advanced imaging of the neck to fully evaluate for probable bulging disks and foraminal narrowing which could be factoring significantly into her pain  symptoms. This would likely be needed to plan further treatment such as spine injections. Patient will follow up after MRI for results.           This document has been electronically signed by Oscar Scott DO   April 28, 2023 14:11 EDT    Dictated Utilizing Dragon Dictation: Part of this note may be an electronic transcription/translation of spoken language to printed text using the Dragon Dictation System.          Transcribed from ambient dictation for Oscar Scott DO by Vicky Schwartz.  04/28/23   17:45 EDT    I have personally performed the services described in this document as transcribed by the above individual, and it is both accurate and complete.

## 2023-05-08 ENCOUNTER — TELEPHONE (OUTPATIENT)
Dept: ORTHOPEDIC SURGERY | Facility: CLINIC | Age: 43
End: 2023-05-08
Payer: MEDICAID

## 2023-05-08 NOTE — TELEPHONE ENCOUNTER
Attempted to call PT to let her know that medicine had been called in for her MRI. Was unable to get through but left vm to return call. Breana Raymond MA

## 2023-05-08 NOTE — TELEPHONE ENCOUNTER
----- Message from Oscar Scott DO sent at 5/5/2023 12:28 PM EDT -----  Sent in 1 dose of xanax for patient to take before MRI

## 2023-05-08 NOTE — TELEPHONE ENCOUNTER
PATIENT CALLED STATING SHE IS MOVING TO [place] AND CAN'T HAVE THE MRI DONE. SHE ASKED IF SHE SHOULD  THE MEDICATION SHE WAS SUPPOSE TO TAKE BEFORE THE MRI, I TOLD HER TO NOT PICK IT UP. I WILL LET [facility] KNOW ABOUT THIS SO SHE CAN CANCEL THE MRI.   Xeljacyz Pregnancy And Lactation Text: This medication is Pregnancy Category D and is not considered safe during pregnancy.  The risk during breast feeding is also uncertain.

## 2023-10-25 ENCOUNTER — OFFICE VISIT (OUTPATIENT)
Dept: URBAN - METROPOLITAN AREA CLINIC 64 | Facility: LOCATION | Age: 43
End: 2023-10-25
Payer: COMMERCIAL

## 2023-10-25 DIAGNOSIS — E11.9 DIABETES MELLITUS TYPE 2 WITHOUT MENTION OF COMPLICATION: Primary | ICD-10-CM

## 2023-10-25 PROCEDURE — 99204 OFFICE O/P NEW MOD 45 MIN: CPT

## 2023-10-25 ASSESSMENT — KERATOMETRY
OS: 45.40
OD: 45.12

## 2023-10-25 ASSESSMENT — INTRAOCULAR PRESSURE
OD: 19
OS: 19

## (undated) DEVICE — UNDERCAST PADDING: Brand: DEROYAL

## (undated) DEVICE — GLV SURG SENSICARE W/ALOE PF LF 8.5 STRL

## (undated) DEVICE — SUT ETHLN 3/0 PS2 18 IN 1669H

## (undated) DEVICE — PK EXTREM UPPR 70

## (undated) DEVICE — HOLDER: Brand: DEROYAL

## (undated) DEVICE — BNDG ELAS ELITE V/CLOSE 3IN 5YD LF STRL

## (undated) DEVICE — APPL CHLORAPREP HI/LITE 26ML ORNG

## (undated) DEVICE — GLV SURG TRIUMPH MICRO PF LTX 8 STRL

## (undated) DEVICE — BNDG ELAS ELITE V/CLOSE 4IN 5YD LF STRL

## (undated) DEVICE — GOWN,NON-REINFORCED,SIRUS,SET IN SLV,XXL: Brand: MEDLINE

## (undated) DEVICE — PATIENT RETURN ELECTRODE, SINGLE-USE, CONTACT QUALITY MONITORING, ADULT, WITH 9FT CORD, FOR PATIENTS WEIGING OVER 33LBS. (15KG): Brand: MEGADYNE

## (undated) DEVICE — WRIST AND FOREARM: Brand: DEROYAL

## (undated) DEVICE — PENCL ES MEGADINE EZ/CLEAN BUTN W/HOLSTR 10FT

## (undated) DEVICE — SPLNT ORTHOGLASS 3INX15FT SN